# Patient Record
Sex: FEMALE | Race: WHITE | NOT HISPANIC OR LATINO | Employment: OTHER | URBAN - METROPOLITAN AREA
[De-identification: names, ages, dates, MRNs, and addresses within clinical notes are randomized per-mention and may not be internally consistent; named-entity substitution may affect disease eponyms.]

---

## 2017-03-08 ENCOUNTER — ALLSCRIPTS OFFICE VISIT (OUTPATIENT)
Dept: OTHER | Facility: OTHER | Age: 72
End: 2017-03-08

## 2017-06-23 ENCOUNTER — HOSPITAL ENCOUNTER (EMERGENCY)
Facility: HOSPITAL | Age: 72
Discharge: HOME | End: 2017-06-23
Attending: EMERGENCY MEDICINE | Admitting: EMERGENCY MEDICINE
Payer: MEDICARE

## 2017-06-23 VITALS
BODY MASS INDEX: 37.17 KG/M2 | WEIGHT: 202 LBS | RESPIRATION RATE: 20 BRPM | HEART RATE: 69 BPM | OXYGEN SATURATION: 95 % | TEMPERATURE: 97.5 F | HEIGHT: 62 IN | SYSTOLIC BLOOD PRESSURE: 144 MMHG | DIASTOLIC BLOOD PRESSURE: 66 MMHG
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
## 2017-06-23 PROCEDURE — 99281 EMR DPT VST MAYX REQ PHY/QHP: CPT

## 2017-06-23 RX ORDER — DIMENHYDRINATE 50 MG
TABLET ORAL
COMMUNITY

## 2017-06-23 RX ORDER — CYCLOSPORINE 0.5 MG/ML
EMULSION OPHTHALMIC
COMMUNITY
End: 2022-06-02 | Stop reason: ALTCHOICE

## 2017-06-23 RX ORDER — FLUTICASONE PROPIONATE 50 MCG
SPRAY, SUSPENSION (ML) NASAL
COMMUNITY
Start: 2014-11-29 | End: 2022-06-02 | Stop reason: ALTCHOICE

## 2017-06-23 RX ORDER — AMPICILLIN TRIHYDRATE 250 MG
CAPSULE ORAL
COMMUNITY

## 2017-06-23 RX ORDER — CEPHALEXIN 500 MG/1
500 CAPSULE ORAL 2 TIMES DAILY
Qty: 14 CAPSULE | Refills: 0 | Status: SHIPPED | OUTPATIENT
Start: 2017-06-23 | End: 2017-06-30

## 2017-06-23 RX ORDER — TELMISARTAN 80 MG/1
TABLET ORAL
COMMUNITY

## 2017-08-24 ENCOUNTER — TRANSCRIBE ORDERS (OUTPATIENT)
Dept: ADMINISTRATIVE | Facility: HOSPITAL | Age: 72
End: 2017-08-24

## 2017-08-24 DIAGNOSIS — Z12.31 VISIT FOR SCREENING MAMMOGRAM: Primary | ICD-10-CM

## 2017-08-25 ENCOUNTER — HOSPITAL ENCOUNTER (OUTPATIENT)
Dept: RADIOLOGY | Facility: HOSPITAL | Age: 72
Discharge: HOME | End: 2017-08-25
Attending: INTERNAL MEDICINE
Payer: MEDICARE

## 2017-08-25 DIAGNOSIS — Z12.31 VISIT FOR SCREENING MAMMOGRAM: ICD-10-CM

## 2017-08-25 PROCEDURE — G0202 SCR MAMMO BI INCL CAD: HCPCS

## 2017-10-17 ENCOUNTER — TRANSCRIBE ORDERS (OUTPATIENT)
Dept: ADMINISTRATIVE | Facility: HOSPITAL | Age: 72
End: 2017-10-17

## 2017-10-17 ENCOUNTER — HOSPITAL ENCOUNTER (OUTPATIENT)
Dept: RADIOLOGY | Facility: HOSPITAL | Age: 72
Discharge: HOME | End: 2017-10-17
Attending: INTERNAL MEDICINE
Payer: MEDICARE

## 2017-10-17 DIAGNOSIS — M54.50 ACUTE MIDLINE LOW BACK PAIN WITHOUT SCIATICA: Primary | ICD-10-CM

## 2017-10-17 DIAGNOSIS — M47.817 DJD (DEGENERATIVE JOINT DISEASE), LUMBOSACRAL: ICD-10-CM

## 2017-10-17 PROCEDURE — 72110 X-RAY EXAM L-2 SPINE 4/>VWS: CPT

## 2018-01-12 NOTE — MISCELLANEOUS
Message  Mando Cristela is under my professional care  Due to protocol changes, patient does not need an antibiotic prior to dental treatment  If you have any questions please give our office a call  Return to work or school:   SINCERELY,                   [address]  ENRIQUETA Cardenas        Signatures   Electronically signed by : ENRIQUETA Vergara ; Aug 29 2016  3:16PM EST

## 2018-01-13 VITALS
DIASTOLIC BLOOD PRESSURE: 85 MMHG | SYSTOLIC BLOOD PRESSURE: 129 MMHG | HEART RATE: 82 BPM | RESPIRATION RATE: 16 BRPM | HEIGHT: 61 IN | WEIGHT: 206 LBS | BODY MASS INDEX: 38.89 KG/M2

## 2018-08-29 ENCOUNTER — OFFICE VISIT (OUTPATIENT)
Dept: PODIATRY | Facility: CLINIC | Age: 73
End: 2018-08-29
Payer: MEDICARE

## 2018-08-29 VITALS
SYSTOLIC BLOOD PRESSURE: 134 MMHG | BODY MASS INDEX: 37.17 KG/M2 | DIASTOLIC BLOOD PRESSURE: 83 MMHG | WEIGHT: 202 LBS | HEIGHT: 62 IN | RESPIRATION RATE: 17 BRPM | HEART RATE: 72 BPM

## 2018-08-29 DIAGNOSIS — B35.1 ONYCHOMYCOSIS: ICD-10-CM

## 2018-08-29 DIAGNOSIS — M79.671 PAIN IN BOTH FEET: ICD-10-CM

## 2018-08-29 DIAGNOSIS — M79.672 PAIN IN BOTH FEET: ICD-10-CM

## 2018-08-29 DIAGNOSIS — I70.209 PERIPHERAL ARTERIOSCLEROSIS (HCC): Primary | ICD-10-CM

## 2018-08-29 PROCEDURE — 11721 DEBRIDE NAIL 6 OR MORE: CPT | Performed by: PODIATRIST

## 2018-08-29 NOTE — PROGRESS NOTES
Assessment/Plan:  Pain  Mycosis of nail  Paronychia  Peripheral artery disease  No problem-specific Assessment & Plan notes found for this encounter  There are no diagnoses linked to this encounter  Subjective:   Patient complains of pain in her feet with ambulation  No history of trauma  Past Medical History:   Diagnosis Date    Hypertension     Macular degeneration        Past Surgical History:   Procedure Laterality Date    INTRAOCULAR LENS INSERTION         Allergies   Allergen Reactions    Atorvastatin Other (See Comments)     legs get stiff    Zithromax [Azithromycin]     Erythromycin Base Palpitations         Current Outpatient Prescriptions:     Calcium Carb-Cholecalciferol (CALCIUM 1000 + D) 1000-800 MG-UNIT TABS, Calcium TABS  Refills: 0  Active, Disp: , Rfl:     cholecalciferol (VITAMIN D3) 1,000 units tablet, Vitamin D TABS  Refills: 0  Active, Disp: , Rfl:     Cinnamon 500 MG capsule, Cinnamon CAPS  Refills: 0  Active, Disp: , Rfl:     cycloSPORINE (RESTASIS) 0 05 % ophthalmic emulsion, Restasis 0 05 % Ophthalmic Emulsion  Refills: 0  Active, Disp: , Rfl:     Flaxseed, Linseed, (FLAX SEED OIL) 1000 MG CAPS, Flax Seed Oil 1000 MG Oral Capsule  Refills: 0  Active, Disp: , Rfl:     fluticasone (FLONASE) 50 mcg/act nasal spray, Fluticasone Propionate 50 MCG/ACT Nasal Suspension  Quantity: 16; Refills: 0   Started 29-Nov-2014 Active, Disp: , Rfl:     Magnesium 100 MG CAPS, Magnesium TABS  Refills: 0  Active, Disp: , Rfl:     Omega-3 Fatty Acids (FISH OIL) 645 MG CAPS, Fish Oil CAPS  Refills: 0  Active, Disp: , Rfl:     Potassium 95 MG TABS, Potassium TABS  Refills: 0  Active, Disp: , Rfl:     telmisartan (MICARDIS) 80 MG tablet, Micardis 80 MG Oral Tablet  Refills: 0  Active, Disp: , Rfl:     There is no problem list on file for this patient  Patient ID: Darrick Olivia is a 68 y o  female      HPI    The following portions of the patient's history were reviewed and updated as appropriate: allergies, current medications, past family history, past medical history, past social history, past surgical history and problem list     Review of Systems      Objective:  Patient's shoes and socks removed  Foot ExamPhysical Exam           Physical Exam  Left Foot: Appearance: Normal except as noted: excessive nfmvqgoyuwRZs2jbs cavus  Tenderness: None except the great toe,Ow6xedeav longitudinal wqcnBPh2orrysrdrg of the plantar fascia  Right Foot: Appearance: Normal except as noted: excessive upnvksglfoOQi1hkj cavus  Tenderness: None except the medial longitudinal kyavAMl9xcbdlepro of the plantar fascia  Left Ankle: ROM: limited ROM in all planes   Right Ankle: ROM: limited ROM in all planes   Neurological Exam: performed  Light touch was intact bilaterally  Vibratory sensation was intact bilaterally  Response to monofilament test was intact bilaterally  Deep tendon reflexes: patellar reflex present mmkkqwtykamBAe1wrrtvplq reflex present bilaterally  Vascular Exam: performed Dorsalis pedis pulses were diminished bilaterally  Posterior tibial pulses were diminished bilaterally  Elevation Pallor: present bilaterally  Capillary refill time was greater than 3 seconds sbdkdkvdghvCOq4Mchnz 8 findings bilateral negative digital hair noted all mycotic nails debrided today  Edema: mild bilaterally  Toenails: All of the toenails were elongated,An6zsyozjmcjpwve,Xk7tvwncmznzn,Cu1homrxyl,Sn2usjhlwVPc9Xqfgbca  Hyperkeratosis: present on both first sub metatarsals  Shoe Gear Evaluation: Recommendation(s): custom [id number] style  Procedure  All mycotic nails debrided today  Bilateral pre-ulcerative lesions debrided today  Patient may consider having the exostosis of the right Lisfranc joint surgically removed   Patient tolerated all procedures well without pain or complication

## 2018-09-13 ENCOUNTER — TRANSCRIBE ORDERS (OUTPATIENT)
Dept: ADMINISTRATIVE | Facility: HOSPITAL | Age: 73
End: 2018-09-13

## 2018-09-13 DIAGNOSIS — Z12.31 VISIT FOR SCREENING MAMMOGRAM: Primary | ICD-10-CM

## 2018-09-24 ENCOUNTER — HOSPITAL ENCOUNTER (OUTPATIENT)
Dept: RADIOLOGY | Facility: HOSPITAL | Age: 73
Discharge: HOME | End: 2018-09-24
Attending: INTERNAL MEDICINE
Payer: MEDICARE

## 2018-09-24 DIAGNOSIS — Z12.31 VISIT FOR SCREENING MAMMOGRAM: ICD-10-CM

## 2018-09-24 PROCEDURE — 77067 SCR MAMMO BI INCL CAD: CPT

## 2018-12-18 ENCOUNTER — EVALUATION (OUTPATIENT)
Dept: PHYSICAL THERAPY | Facility: CLINIC | Age: 73
End: 2018-12-18
Payer: MEDICARE

## 2018-12-18 DIAGNOSIS — R26.81 UNSTEADY GAIT: Primary | ICD-10-CM

## 2018-12-18 PROCEDURE — 97163 PT EVAL HIGH COMPLEX 45 MIN: CPT

## 2018-12-18 PROCEDURE — G8978 MOBILITY CURRENT STATUS: HCPCS

## 2018-12-18 PROCEDURE — G8979 MOBILITY GOAL STATUS: HCPCS

## 2018-12-18 NOTE — PROGRESS NOTES
PT Evaluation     Today's date: 2018  Patient name: Stephanie Holly  : 1945  MRN: [id number]  Referring provider: Khanh Nunes MD  Dx:   Encounter Diagnosis     ICD-10-CM    1  Unsteady gait R26 81        Start Time: 1115  Stop Time: 1215  Total time in clinic (min): 60 minutes    Assessment  Assessment details: Patient presents with complaints of feeling unsteady in gait  Patient demonstrates increased veering with HT in gait, increased veering to right with eyes closed and sluggish head thrust indicative of vestibular dysfunction  Patient will benefit from skilled intervention to facilitate return to previous level of function addressing goals as noted below  Patient is in agreement with treatment plan  Impairments: abnormal gait and impaired balance    Goals  STG  (4 weeks)  Patient will be independent in vestibular exercises to facilitate return to previous activities                              Patient will demonstrate decreased veering in gait with HT  LTG (8weeks)   Patient will improve 6 min walk test by 100' to increase tolerance to distance ambulation                            Patient will improve FOTO scoring by 20% to increase tolerance to ADL tasks                            Patient will demonstrate improved DGI scoring to >=23/24 to facilitate return to previous tasks      Plan  Plan details: Continue PT per POC  (2018- 2018)  Patient would benefit from: skilled physical therapy  Planned therapy interventions: ADL retraining, balance, coordination, neuromuscular re-education, manual therapy, strengthening, therapeutic activities, therapeutic exercise and home exercise program  Frequency: 2x week  Duration in weeks: 8  Treatment plan discussed with: patient        Subjective Evaluation    History of Present Illness  Mechanism of injury: Patient indicates worsening feeling of being off balance over the past few months    Patient states her son has experienced drug issues and this stress has contributed to depression  Per patient report, her MD is aware and no counseling has been initiated at this time  Patient denies any risk of self-harm  No current complaints of pain noted  Patient denies any dizziness or spinning, no aural fullness noted  Recurrent probem    Pain  Current pain ratin  At best pain ratin  At worst pain ratin    Social Support  Steps to enter house: yes  5  Lives in: multiple-level home  Lives with: spouse    Employment status: not working  Treatments  Previous treatment: physical therapy  Patient Goals  Patient goals for therapy: improved balance and independence with ADLs/IADLs  Patient goal: To be able to move without fear of falling        Objective     NEURO:  Intact finger to nose and HANNAH, negative drift  No abnormal tone noted  Strength t/o LE 4/5  UE left 4-/5 shoulder with flexion 160; Otherwise strength B/L UE 4/5  BALANCE:  Romberg EO/EC  (-),  Sharpened romberg (+) 7 sec  Patient is able to accept moderate perturbation with intact ankle and hip strategies  POSTURE:  Increased forward head and shoulders  Slouched posture assumed in sitting and increased base of support evident in stance  Flowsheet Rows      Most Recent Value   PT/OT G-Codes   Current Score  41   Projected Score  0   FOTO information reviewed  Yes   Assessment Type  Evaluation   G code set  Mobility: Walking & Moving Around   Mobility: Walking and Moving Around Current Status ()  CK   Mobility: Walking and Moving Around Goal Status ()  CJ        OCULOMOTOR:  Smooth pursuit t/o visual field  Head thrust sluggish to left  Positional testing not performed due to no complaints of dizziness or spinning        Precautions: HTN, 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, macular degeneration, h/o shoulder replacement 5 yrs, depression    Daily Treatment Diary     TESTING              TUG 7 5sec            DGI             Gait speed 3 98 feet/sec            6 min walk test 1050ft            Sit to stand 30 sec 15reps                Exercise Diary  12/18            FOAM  FT  EO/EC             FOAM  FT HT             A/P Sway             VOR 1 60sec 2 sets            VOR Cxl             VOR 2             Gait with HT 4 cycles                                                                                                                                                                                         Modalities

## 2018-12-27 ENCOUNTER — APPOINTMENT (OUTPATIENT)
Dept: PHYSICAL THERAPY | Facility: CLINIC | Age: 73
End: 2018-12-27
Payer: MEDICARE

## 2018-12-31 ENCOUNTER — OFFICE VISIT (OUTPATIENT)
Dept: PHYSICAL THERAPY | Facility: CLINIC | Age: 73
End: 2018-12-31
Payer: MEDICARE

## 2018-12-31 DIAGNOSIS — R26.81 UNSTEADY GAIT: Primary | ICD-10-CM

## 2018-12-31 PROCEDURE — 97112 NEUROMUSCULAR REEDUCATION: CPT

## 2018-12-31 NOTE — PROGRESS NOTES
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, macular degeneration, h/o shoulder replacement 5 yrs, depression    Daily Treatment Diary     TESTING              TUG 7 5sec            DGI             Gait speed 3 98 feet/sec            6 min walk test 1050ft            Sit to stand 30 sec 15reps                Exercise Diary             FOAM  FT  EO/EC  10sec 10 reps FIRM           FOAM  FT HT  FIRM 10reps 2 sets           A/P Sway  FOAM 30sec           VOR 1 60sec 2 sets 60sec 2 sets           VOR Cxl  10reps 2 sets           VOR 2             Gait with HT 4 cycles 6 cycles           Tandem in corner  20sec 5 reps                                                                                                                                                                           Modalities                                                           Assessment: Patient retested for BPPV due to increased aural fullness and increased symptoms  DHP (-)  Re-initiated vestibular challenges with increased gait challenges  Initiated EC exercises, tandem stance in corner and HT in gait  Patient encouraged to continue at home  Patient voiced understanding and has been given written instructions  Patient will continue to benefit from skilled intervention to facilitate improved stability and balance in all ADL tasks  Plan: Continue PT per POC    (2018- 2018)  Patient without complaints post treatment

## 2019-01-03 ENCOUNTER — OFFICE VISIT (OUTPATIENT)
Dept: PHYSICAL THERAPY | Facility: CLINIC | Age: 74
End: 2019-01-03
Payer: MEDICARE

## 2019-01-03 DIAGNOSIS — R26.81 UNSTEADY GAIT: Primary | ICD-10-CM

## 2019-01-03 PROCEDURE — 97112 NEUROMUSCULAR REEDUCATION: CPT

## 2019-01-03 PROCEDURE — 97110 THERAPEUTIC EXERCISES: CPT

## 2019-01-03 NOTE — PROGRESS NOTES
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, macular degeneration, h/o shoulder replacement 5 yrs, depression    Daily Treatment Diary     TESTING              TUG 7 5sec            DGI             Gait speed 3 98 feet/sec            6 min walk test 1050ft            Sit to stand 30 sec 15reps                Exercise Diary  12/18 12/31 1/3/19          FOAM  FT  EO/EC  10sec 10 reps FIRM 10sec 10reps FOAM LOB=3          FOAM  FT HT  FIRM 10reps 2 sets FOAM EC FC 10reps 2 sets          A/P Sway  FOAM 30sec           VOR 1 60sec 2 sets 60sec 2 sets 60sec 2 sets, added VS 60sec 2 sets          VOR Cxl  10reps 2 sets W/ VS CW/CCW w/ circles 10reps 2 sets          VOR 2             Gait with HT 4 cycles 6 cycles 6 cycles          Tandem in corner  20sec 5 reps 20sec 5 reps          Stepping over hurdles    Forward low hurdles 6cycles          Sidestepping over hurdles   4 cycles          Gait with HT on diagonal   6 cycles          Sidestepping with cone taps one UE   4 cycles                                                                                                                      Modalities                                                           Assessment:  Advanced HT in gait to diagonals with good tolerance  Increased veering noted with up to the left  Introduced hurdles with encouragement needed due to fear of falls  Continue to progress gait challenges per tolerance     Patient will continue to benefit from skilled intervention to facilitate improved stability and balance in all ADL tasks  Plan: Continue PT per POC  (12/18/2018- 2/12/2018)  Patient without complaints post treatment

## 2019-01-07 ENCOUNTER — OFFICE VISIT (OUTPATIENT)
Dept: PHYSICAL THERAPY | Facility: CLINIC | Age: 74
End: 2019-01-07
Payer: MEDICARE

## 2019-01-07 DIAGNOSIS — R26.81 UNSTEADY GAIT: Primary | ICD-10-CM

## 2019-01-07 PROCEDURE — 97112 NEUROMUSCULAR REEDUCATION: CPT

## 2019-01-07 PROCEDURE — 97110 THERAPEUTIC EXERCISES: CPT

## 2019-01-07 NOTE — PROGRESS NOTES
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, macular degeneration, h/o shoulder replacement 5 yrs, depression    Daily Treatment Diary     TESTING              TUG 7 5sec            DGI             Gait speed 3 98 feet/sec            6 min walk test 1050ft            Sit to stand 30 sec 15reps                Exercise Diary  12/18 12/31 1/3/19 1/7         FOAM  FT  EO/EC  10sec 10 reps FIRM 10sec 10reps FOAM LOB=3          FOAM  FT HT  FIRM 10reps 2 sets FOAM EC FC 10reps 2 sets          A/P Sway  FOAM 30sec           VOR 1 60sec 2 sets 60sec 2 sets 60sec 2 sets, added VS 60sec 2 sets 60sec 2 sets w/ VS         VOR Cxl  10reps 2 sets W/ VS CW/CCW w/ circles 10reps 2 sets W/ VS  CW/CCW 10reps 2 sets         VOR 2    30sec 4 sets, occ stop for tracking         Gait with HT 4 cycles 6 cycles 6 cycles 6 cycles         Tandem in corner  20sec 5 reps 20sec 5 reps 30sec 5 reps, decreased haptic touch         Stepping over hurdles    Forward low hurdles 6cycles F/B 6" hurdles 6 cycles         Sidestepping over hurdles   4 cycles 4 cycles         Gait with HT on diagonal   6 cycles 6 cycles nice gains in up to left, added up to right for HEP see below         Sidestepping with cone taps one UE   4 cycles 4 cycles one UE on wall                                                                                                                     Modalities                                                           Assessment: Initiated VOR 2 with occasional cuing needed for tracking    Nice gains with decreased UE support on foam challenges  While increased stability noted with diagonal HT up to left, increased veering noted up to right (patient has not been performing this position at home)    Recommended continued diagonal HT B/L and patient voiced understanding  patient also instructed to continue VOR 2 with written instructions given  Patient will continue to benefit from skilled intervention to facilitate improved stability and balance in all ADL tasks  Plan: Continue PT per POC  (12/18/2018- 2/12/2018)  Patient without complaints post treatment

## 2019-01-10 ENCOUNTER — OFFICE VISIT (OUTPATIENT)
Dept: PHYSICAL THERAPY | Facility: CLINIC | Age: 74
End: 2019-01-10
Payer: MEDICARE

## 2019-01-10 DIAGNOSIS — R26.81 UNSTEADY GAIT: Primary | ICD-10-CM

## 2019-01-10 PROCEDURE — 97112 NEUROMUSCULAR REEDUCATION: CPT

## 2019-01-10 PROCEDURE — 97110 THERAPEUTIC EXERCISES: CPT

## 2019-01-10 NOTE — PROGRESS NOTES
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, macular degeneration, h/o shoulder replacement 5 yrs, depression    Daily Treatment Diary     TESTING              TUG 7 5sec            DGI             Gait speed 3 98 feet/sec            6 min walk test 1050ft            Sit to stand 30 sec 15reps                Exercise Diary  12/18 12/31 1/3/19 1/7 1/10        FOAM  FT  EO/EC  10sec 10 reps FIRM 10sec 10reps FOAM LOB=3          FOAM  FT HT  FIRM 10reps 2 sets FOAM EC FC 10reps 2 sets          A/P Sway  FOAM 30sec           VOR 1 60sec 2 sets 60sec 2 sets 60sec 2 sets, added VS 60sec 2 sets 60sec 2 sets w/ VS 90sec 2 sets w/ VS        VOR Cxl  10reps 2 sets W/ VS CW/CCW w/ circles 10reps 2 sets W/ VS  CW/CCW 10reps 2 sets W/ VS CW/CCW 20reps 2 sets        VOR 2    30sec 4 sets, occ stop for tracking 60sec 2 sets increased tracking noted        Gait with HT 4 cycles 6 cycles 6 cycles 6 cycles 6 cycles        Tandem in corner  20sec 5 reps 20sec 5 reps 30sec 5 reps, decreased haptic touch 30sec 10 reps, unilateral stance 5 sec 10 reps B/L        Stepping over hurdles    Forward low hurdles 6cycles F/B 6" hurdles 6 cycles         Sidestepping over hurdles   4 cycles 4 cycles         Gait with HT on diagonal   6 cycles 6 cycles nice gains in up to left, added up to right for HEP see below 6 cycles now with no veering in either direction        Sidestepping on foam with cone taps one UE   4 cycles 4 cycles one UE on wall 4 cycles without UE support, increased dizziness Sidestepping on foam     4 cycles                                                                                                       Modalities                                                           Assessment:   Patient able to demonstrate nice gains in tracking with VOR 2  Added unilateral stance in corner for home to improve static and dynamic balance  Encouraged patient to go out into community more frequently  Patient voiced understanding, however, due to intermittent diarrhea patient has been limited  Patient is to see GI specialist next week, details to follow  Nice gains with HT with decreased veering  Cone taps on foam continue to be challenging  Patient will continue to benefit from skilled intervention to facilitate improved stability and balance in all ADL tasks  Plan: Continue PT per POC  (12/18/2018- 2/12/2018)  Patient without complaints post treatment

## 2019-01-14 ENCOUNTER — OFFICE VISIT (OUTPATIENT)
Dept: PHYSICAL THERAPY | Facility: CLINIC | Age: 74
End: 2019-01-14
Payer: MEDICARE

## 2019-01-14 DIAGNOSIS — R26.81 UNSTEADY GAIT: Primary | ICD-10-CM

## 2019-01-14 PROCEDURE — 97112 NEUROMUSCULAR REEDUCATION: CPT

## 2019-01-14 PROCEDURE — 97110 THERAPEUTIC EXERCISES: CPT

## 2019-01-14 NOTE — PROGRESS NOTES
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, macular degeneration, h/o shoulder replacement 5 yrs, depression    Daily Treatment Diary     TESTING              TUG 7 5sec            DGI             Gait speed 3 98 feet/sec            6 min walk test 1050ft            Sit to stand 30 sec 15reps                Exercise Diary  12/18 12/31 1/3/19 1/7 1/10 1/14       FOAM  FT  EO/EC  10sec 10 reps FIRM 10sec 10reps FOAM LOB=3   10sec 10repsFOAM LOB=0       FOAM  FT HT  FIRM 10reps 2 sets FOAM EC FC 10reps 2 sets   FOAM EC 10reps 2 sets LOB=0       A/P Sway  FOAM 30sec    FOAM 60sec       VOR 1 60sec 2 sets 60sec 2 sets 60sec 2 sets, added VS 60sec 2 sets 60sec 2 sets w/ VS 90sec 2 sets w/ VS 90sec 2 sets w/ VS walking F/B       VOR Cxl  10reps 2 sets W/ VS CW/CCW w/ circles 10reps 2 sets W/ VS  CW/CCW 10reps 2 sets W/ VS CW/CCW 20reps 2 sets Foam w/ VS CW/CCW 20 reps 2 sets       VOR 2    30sec 4 sets, occ stop for tracking 60sec 2 sets increased tracking noted On foam 90 sec 2 sets        Gait with HT 4 cycles 6 cycles 6 cycles 6 cycles 6 cycles 4 cycles        Tandem in corner  20sec 5 reps 20sec 5 reps 30sec 5 reps, decreased haptic touch 30sec 10 reps, unilateral stance 5 sec 10 reps B/L        Stepping over hurdles    Forward low hurdles 6cycles F/B 6" hurdles 6 cycles  F/B 6" 6 cycles       Sidestepping over hurdles   4 cycles 4 cycles  6 cycles       Gait with HT on diagonal   6 cycles 6 cycles nice gains in up to left, added up to right for HEP see below 6 cycles now with no veering in either direction 6 cycles       Sidestepping on foam with cone taps one UE   4 cycles 4 cycles one UE on wall 4 cycles without UE support, increased dizziness 4 cycles one UE support       Sidestepping on foam     4 cycles 4 cycles                                                                                                      Modalities                                                           Assessment:   Nice gains noted with osmar steps with increased stability noted  Cone taps continue to be challenging  Increased difficulty with VOR 1 walking forward and back and VOR 2 with foam   Continue to progress vestibular challenges per tolerance  Patient will continue to benefit from skilled intervention to facilitate improved stability and balance in all ADL tasks  Plan: Continue PT per POC  (12/18/2018- 2/12/2018)  Patient without complaints post treatment

## 2019-01-21 ENCOUNTER — OFFICE VISIT (OUTPATIENT)
Dept: PHYSICAL THERAPY | Facility: CLINIC | Age: 74
End: 2019-01-21
Payer: MEDICARE

## 2019-01-21 DIAGNOSIS — R26.81 UNSTEADY GAIT: Primary | ICD-10-CM

## 2019-01-21 PROCEDURE — 97112 NEUROMUSCULAR REEDUCATION: CPT

## 2019-01-21 PROCEDURE — 97110 THERAPEUTIC EXERCISES: CPT

## 2019-01-21 NOTE — PROGRESS NOTES
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, macular degeneration, h/o shoulder replacement 5 yrs, depression    Daily Treatment Diary     TESTING              TUG 7 5sec            DGI             Gait speed 3 98 feet/sec            6 min walk test 1050ft            Sit to stand 30 sec 15reps                Exercise Diary  12/18 12/31 1/3/19 1/7 1/10 1/14 1/21      FOAM  FT  EO/EC  10sec 10 reps FIRM 10sec 10reps FOAM LOB=3   10sec 10repsFOAM LOB=0       FOAM  FT HT  FIRM 10reps 2 sets FOAM EC FC 10reps 2 sets   FOAM EC 10reps 2 sets LOB=0       A/P Sway  FOAM 30sec    FOAM 60sec       VOR 1 60sec 2 sets 60sec 2 sets 60sec 2 sets, added VS 60sec 2 sets 60sec 2 sets w/ VS 90sec 2 sets w/ VS 90sec 2 sets w/ VS walking F/B 90sec 2 sets VS w/ walking F/B- veering noted      VOR Cxl  10reps 2 sets W/ VS CW/CCW w/ circles 10reps 2 sets W/ VS  CW/CCW 10reps 2 sets W/ VS CW/CCW 20reps 2 sets Foam w/ VS CW/CCW 20 reps 2 sets FOAM w/ VS CW/CCW 20 reps 2 sets      VOR 2    30sec 4 sets, occ stop for tracking 60sec 2 sets increased tracking noted On foam 90 sec 2 sets  On foam w/ VS 90sec 2 sets      Gait with HT 4 cycles 6 cycles 6 cycles 6 cycles 6 cycles 4 cycles  4 cycles      Tandem in corner  20sec 5 reps 20sec 5 reps 30sec 5 reps, decreased haptic touch 30sec 10 reps, unilateral stance 5 sec 10 reps B/L  30sec hold 5 reps       Stepping over hurdles    Forward low hurdles 6cycles F/B 6" hurdles 6 cycles  F/B 6" 6 cycles       Sidestepping over hurdles   4 cycles 4 cycles  6 cycles       Gait with HT on diagonal   6 cycles 6 cycles nice gains in up to left, added up to right for HEP see below 6 cycles now with no veering in either direction 6 cycles 6 cycles no veering noted      Sidestepping on foam with cone taps one UE   4 cycles 4 cycles one UE on wall 4 cycles without UE support, increased dizziness 4 cycles one UE support 2 cycles one UE support      Sidestepping on foam     4 cycles 4 cycles 4 cycles      Ankle inversion/eversion TB       10reps 2 sets each ankle Blue TB      Heel to toe ambulation       4 cycles                                                                           Modalities                                                           Assessment:   Patient exhibits decreased veering with HT in gait  Added Tandem walking with increased loss of balance noted  Tandem stance improving  Patient encouraged to continue tandem walking in storey for safety at home, patient voiced understanding  VS with increased veering noted with VOR 1 walking forward and back  While patient has been performing VOR 1 at home, no VS available  Patient given checkerboard to use at home for VS   Patient noted to have ankle weakness, added ankle exercises with TB with good demonstration of technique and patient voiced understanding  Patient will continue to benefit from skilled intervention to facilitate improved stability and balance in all ADL tasks  Plan: Continue PT per POC  (12/18/2018- 2/12/2018)  Patient without complaints post treatment

## 2019-01-24 ENCOUNTER — OFFICE VISIT (OUTPATIENT)
Dept: PHYSICAL THERAPY | Facility: CLINIC | Age: 74
End: 2019-01-24
Payer: MEDICARE

## 2019-01-24 DIAGNOSIS — R26.81 UNSTEADY GAIT: Primary | ICD-10-CM

## 2019-01-24 PROCEDURE — 97112 NEUROMUSCULAR REEDUCATION: CPT

## 2019-01-24 PROCEDURE — G8978 MOBILITY CURRENT STATUS: HCPCS

## 2019-01-24 PROCEDURE — 97110 THERAPEUTIC EXERCISES: CPT

## 2019-01-24 PROCEDURE — G8979 MOBILITY GOAL STATUS: HCPCS

## 2019-01-24 NOTE — PROGRESS NOTES
PT DISCHARGE    Today's date: 2019  Patient name: Kalen Venegas  : 1945  MRN: [id number]  Referring provider: Robin Blackwood MD  Dx:   Encounter Diagnosis     ICD-10-CM    1  Unsteady gait R26 81        Start Time: 108  Stop Time: 0150  Total time in clinic (min): 42 minutes    Assessment  Assessment details: Patient demonstrates nice gains in gait with HT without veering noted  Patient has improved DGI and TUG scoring with good tolerance  Patient has attained all goals with PT and has returned to previous activities without symptoms noted  Patient is discharged from PT at this time and has been encouraged to continue HEP and to contact PT should any questions or problems arise  Patient in agreement with discharge plans  Goals  STG  (4 weeks)  Patient will be independent in vestibular exercises to facilitate return to previous activities-MET                              Patient will demonstrate decreased veering in gait with HT-MET  LTG (8weeks)   Patient will improve 6 min walk test by 100' to increase tolerance to distance ambulation-MET                            Patient will improve FOTO scoring by 20% to increase tolerance to ADL tasks-MET                            Patient will demonstrate improved DGI scoring to >=23/24 to facilitate return to previous tasks-MET          Plan  Treatment plan discussed with: patient        Subjective Evaluation    History of Present Illness  Mechanism of injury: Patient indicates worsening feeling of being off balance over the past few months  Patient states her son has experienced drug issues and this stress has contributed to depression  Per patient report, her MD is aware and no counseling has been initiated at this time  Patient denies any risk of self-harm  No current complaints of pain noted  Patient denies any dizziness or spinning, no aural fullness noted    Recurrent probem    Pain  Current pain ratin  At best pain ratin  At worst pain ratin    Social Support  Steps to enter house: yes  5  Lives in: multiple-level home  Lives with: spouse    Employment status: not working  Treatments  Previous treatment: physical therapy  Patient Goals  Patient goals for therapy: improved balance and independence with ADLs/IADLs  Patient goal: To be able to move without fear of falling        Objective     NEURO:  Intact finger to nose and HANNAH, negative drift  No abnormal tone noted  Strength t/o LE 4/5  UE left 4-/5 shoulder with flexion 160; Otherwise strength B/L UE 4/5  BALANCE:  Romberg EO/EC  (-),  Sharpened romberg (+) 7 sec  Patient is able to accept moderate perturbation with intact ankle and hip strategies  2019  Sharpened romberg (+) 15sec nice gains in increased time  POSTURE:  Increased forward head and shoulders  Slouched posture assumed in sitting and increased base of support evident in stance  Flowsheet Rows      Most Recent Value   PT/OT G-Codes   Current Score  85   Projected Score  62   FOTO information reviewed  Yes   Assessment Type  Re-evaluation   G code set  Mobility: Walking & Moving Around   Mobility: Walking and Moving Around Current Status ()  CJ   Mobility: Walking and Moving Around Goal Status ()  CJ        OCULOMOTOR:  Smooth pursuit t/o visual field  Head thrust sluggish to left  Positional testing not performed due to no complaints of dizziness or spinning        Precautions: HTN, 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, macular degeneration, h/o shoulder replacement 5 yrs, depression    Daily Treatment Diary     TESTING             TUG 7 5sec 7 4sec           DGI            Gait speed 3 98 feet/sec 3 99'/sec           6 min walk test 1050ft 1300'           Sit to stand 30 sec 15reps 17reps               Exercise Diary             FOAM  FT  EO/EC             FOAM  FT HT             A/P Sway  60sec           VOR 1 60sec 2 sets 60sec 2 sets           VOR Cxl             VOR 2 Gait with HT 4 cycles 6 cycles                                                                                                                                                                                        Modalities

## 2019-01-28 ENCOUNTER — APPOINTMENT (OUTPATIENT)
Dept: PHYSICAL THERAPY | Facility: CLINIC | Age: 74
End: 2019-01-28
Payer: MEDICARE

## 2019-01-31 ENCOUNTER — APPOINTMENT (OUTPATIENT)
Dept: PHYSICAL THERAPY | Facility: CLINIC | Age: 74
End: 2019-01-31
Payer: MEDICARE

## 2019-04-05 ENCOUNTER — TRANSCRIBE ORDERS (OUTPATIENT)
Dept: ADMINISTRATIVE | Facility: HOSPITAL | Age: 74
End: 2019-04-05

## 2019-04-05 DIAGNOSIS — M85.80 OSTEOPENIA, UNSPECIFIED LOCATION: Primary | ICD-10-CM

## 2019-04-16 ENCOUNTER — HOSPITAL ENCOUNTER (OUTPATIENT)
Dept: RADIOLOGY | Facility: HOSPITAL | Age: 74
Discharge: HOME | End: 2019-04-16
Attending: INTERNAL MEDICINE
Payer: MEDICARE

## 2019-04-16 DIAGNOSIS — M85.80 OSTEOPENIA, UNSPECIFIED LOCATION: ICD-10-CM

## 2019-04-16 PROCEDURE — 77080 DXA BONE DENSITY AXIAL: CPT

## 2019-07-16 ENCOUNTER — OFFICE VISIT (OUTPATIENT)
Dept: PODIATRY | Facility: CLINIC | Age: 74
End: 2019-07-16
Payer: MEDICARE

## 2019-07-16 VITALS — HEART RATE: 71 BPM | RESPIRATION RATE: 16 BRPM | DIASTOLIC BLOOD PRESSURE: 59 MMHG | SYSTOLIC BLOOD PRESSURE: 145 MMHG

## 2019-07-16 DIAGNOSIS — B35.1 ONYCHOMYCOSIS: ICD-10-CM

## 2019-07-16 DIAGNOSIS — M79.671 PAIN IN BOTH FEET: ICD-10-CM

## 2019-07-16 DIAGNOSIS — I70.209 PERIPHERAL ARTERIOSCLEROSIS (HCC): Primary | ICD-10-CM

## 2019-07-16 DIAGNOSIS — M79.672 PAIN IN BOTH FEET: ICD-10-CM

## 2019-07-16 PROCEDURE — 11721 DEBRIDE NAIL 6 OR MORE: CPT | Performed by: PODIATRIST

## 2019-07-16 NOTE — PROGRESS NOTES
Assessment/Plan:    Aseptic debridement and planning of nails x10 and manually and mechanically  Discussed proper foot care daily foot checks  Follow-up 3 months     Diagnoses and all orders for this visit:    Peripheral arteriosclerosis ([address] )    Pain in both feet    Onychomycosis          Subjective:      Patient ID: Claudean Floras is a 69 y o  female  Patient has chief complaint of thick painful nails that hurt when walking wearing shoes  Patient has not noticed any redness or signs of infection  Past Medical History:   Diagnosis Date    Hypertension     Macular degeneration          Current Outpatient Medications:     Calcium Carb-Cholecalciferol (CALCIUM 1000 + D) 1000-800 MG-UNIT TABS, Calcium TABS  Refills: 0  Active, Disp: , Rfl:     cholecalciferol (VITAMIN D3) 1,000 units tablet, Vitamin D TABS  Refills: 0  Active, Disp: , Rfl:     Cinnamon 500 MG capsule, Cinnamon CAPS  Refills: 0  Active, Disp: , Rfl:     cycloSPORINE (RESTASIS) 0 05 % ophthalmic emulsion, Restasis 0 05 % Ophthalmic Emulsion  Refills: 0  Active, Disp: , Rfl:     Flaxseed, Linseed, (FLAX SEED OIL) 1000 MG CAPS, Flax Seed Oil 1000 MG Oral Capsule  Refills: 0  Active, Disp: , Rfl:     fluticasone (FLONASE) 50 mcg/act nasal spray, Fluticasone Propionate 50 MCG/ACT Nasal Suspension  Quantity: 16;   Refills: 0   Started 29-Nov-2014 Active, Disp: , Rfl:     Magnesium 100 MG CAPS, Magnesium TABS  Refills: 0  Active, Disp: , Rfl:     Omega-3 Fatty Acids (FISH OIL) 645 MG CAPS, Fish Oil CAPS  Refills: 0  Active, Disp: , Rfl:     Potassium 95 MG TABS, Potassium TABS  Refills: 0  Active, Disp: , Rfl:     telmisartan (MICARDIS) 80 MG tablet, Micardis 80 MG Oral Tablet  Refills: 0  Active, Disp: , Rfl:     Past Surgical History:   Procedure Laterality Date    INTRAOCULAR LENS INSERTION         Allergies   Allergen Reactions    Atorvastatin Other (See Comments)     legs get stiff    Zithromax [Azithromycin]     Erythromycin Base Palpitations       Patient Active Problem List   Diagnosis    Peripheral arteriosclerosis ([address] )    Pain in both feet    Onychomycosis       Review of Systems   Constitutional: Negative  HENT: Negative  Eyes: Negative  Respiratory: Negative  Cardiovascular: Negative  Gastrointestinal: Negative  Endocrine: Negative  Genitourinary: Negative  Musculoskeletal: Negative  Skin: Negative  Allergic/Immunologic: Negative  Neurological: Negative  Hematological: Negative  Psychiatric/Behavioral: Negative  Objective:  Patient's shoes and socks were removed, feet examined  /59   Pulse 71   Resp 16       Foot Exam    General  General Appearance: appears stated age and healthy   Orientation: alert and oriented to person, place, and time   Affect: appropriate       Right Foot/Ankle     Inspection and Palpation  Arch: pes planus  Hammertoes: second toe, fifth toe, fourth toe and third toe  Hallux limitus: yes  Skin Exam: callus and skin changes; Neurovascular  Dorsalis pedis: 1+  Posterior tibial: 1+      Left Foot/Ankle      Inspection and Palpation  Arch: pes planus  Hammertoes: second toe, fifth toe, fourth toe and third toe  Hallux limitus: yes  Skin Exam: callus and skin changes; Neurovascular  Dorsalis pedis: 1+  Posterior tibial: 1+          Right Foot/Ankle   Right Foot Inspection  Skin Exam: callus and callus                              Vascular    The right DP pulse is 1+  The right PT pulse is 1+  Right Toe  - Comprehensive Exam  Arch: pes planus  Hammertoes: second toe, fifth toe, fourth toe and third toe  Hallux limitus: yes    Left Foot/Ankle  Left Foot Inspection  Skin Exam: callus                                           Vascular    The left DP pulse is 1+  The left PT pulse is 1+     Left Toe  - Comprehensive Exam  Arch: pes planus  Hammertoes: second toe, fifth toe, fourth toe and third toe  Hallux limitus: yes         Physical Exam Cardiovascular:   Pulses:       Dorsalis pedis pulses are 1+ on the right side, and 1+ on the left side  Posterior tibial pulses are 1+ on the right side, and 1+ on the left side  Feet:   Right Foot:   Skin Integrity: Positive for callus  Left Foot:   Skin Integrity: Positive for callus           Procedures    All nails are thick, discolored, dystrophic possible debris positive pain on palpation  Moderate incurvation bilateral hallux nails  Skin is hairless and atrophic bilateral  Decreased vibratory sensation bilateral feet and ankles  Monofilament sensation reduced bilateral feet and ankles  Muscle strength 5/5 all groups bilateral feet and ankles  No open wounds or signs of infection

## 2019-09-27 ENCOUNTER — TRANSCRIBE ORDERS (OUTPATIENT)
Dept: ADMINISTRATIVE | Facility: HOSPITAL | Age: 74
End: 2019-09-27

## 2019-09-27 DIAGNOSIS — Z12.39 BREAST SCREENING, UNSPECIFIED: Primary | ICD-10-CM

## 2019-10-01 ENCOUNTER — HOSPITAL ENCOUNTER (OUTPATIENT)
Dept: RADIOLOGY | Facility: HOSPITAL | Age: 74
Discharge: HOME | End: 2019-10-01
Attending: INTERNAL MEDICINE
Payer: MEDICARE

## 2019-10-01 VITALS — HEIGHT: 63 IN | WEIGHT: 202 LBS | BODY MASS INDEX: 35.79 KG/M2

## 2019-10-01 DIAGNOSIS — Z12.39 BREAST SCREENING: ICD-10-CM

## 2019-10-01 PROCEDURE — 77067 SCR MAMMO BI INCL CAD: CPT

## 2019-12-12 ENCOUNTER — TRANSCRIBE ORDERS (OUTPATIENT)
Dept: ADMINISTRATIVE | Facility: HOSPITAL | Age: 74
End: 2019-12-12

## 2019-12-12 DIAGNOSIS — I49.9 CARDIAC ARRHYTHMIA, UNSPECIFIED CARDIAC ARRHYTHMIA TYPE: Primary | ICD-10-CM

## 2020-08-10 ENCOUNTER — OFFICE VISIT (OUTPATIENT)
Dept: PODIATRY | Facility: CLINIC | Age: 75
End: 2020-08-10
Payer: MEDICARE

## 2020-08-10 VITALS — HEIGHT: 63 IN | RESPIRATION RATE: 16 BRPM | WEIGHT: 202 LBS | BODY MASS INDEX: 35.79 KG/M2

## 2020-08-10 DIAGNOSIS — B35.1 ONYCHOMYCOSIS: ICD-10-CM

## 2020-08-10 DIAGNOSIS — I70.209 PERIPHERAL ARTERIOSCLEROSIS (HCC): Primary | ICD-10-CM

## 2020-08-10 DIAGNOSIS — M79.671 PAIN IN BOTH FEET: ICD-10-CM

## 2020-08-10 DIAGNOSIS — M79.672 PAIN IN BOTH FEET: ICD-10-CM

## 2020-08-10 PROCEDURE — 11721 DEBRIDE NAIL 6 OR MORE: CPT | Performed by: PODIATRIST

## 2020-08-10 NOTE — PROGRESS NOTES
Assessment/Plan:  Pain  Mycosis of nail  Paronychia  Peripheral artery disease      Plan  Foot exam performed  Patient educated on condition  All mycotic nails debrided without pain or complication      Subjective:   Patient complains of pain in her feet with ambulation  No history of trauma           Past Medical History:   Diagnosis Date    Hypertension      Macular degeneration                 Past Surgical History:   Procedure Laterality Date    INTRAOCULAR LENS INSERTION                   Allergies   Allergen Reactions    Atorvastatin Other (See Comments)       legs get stiff    Zithromax [Azithromycin]      Erythromycin Base Palpitations            Current Outpatient Prescriptions:     Calcium Carb-Cholecalciferol (CALCIUM 1000 + D) 1000-800 MG-UNIT TABS, Calcium TABS  Refills: 0  Active, Disp: , Rfl:     cholecalciferol (VITAMIN D3) 1,000 units tablet, Vitamin D TABS  Refills: 0  Active, Disp: , Rfl:     Cinnamon 500 MG capsule, Cinnamon CAPS  Refills: 0  Active, Disp: , Rfl:     cycloSPORINE (RESTASIS) 0 05 % ophthalmic emulsion, Restasis 0 05 % Ophthalmic Emulsion  Refills: 0  Active, Disp: , Rfl:     Flaxseed, Linseed, (FLAX SEED OIL) 1000 MG CAPS, Flax Seed Oil 1000 MG Oral Capsule  Refills: 0  Active, Disp: , Rfl:     fluticasone (FLONASE) 50 mcg/act nasal spray, Fluticasone Propionate 50 MCG/ACT Nasal Suspension  Quantity: 16;   Refills: 0   Started 29-Nov-2014 Active, Disp: , Rfl:     Magnesium 100 MG CAPS, Magnesium TABS  Refills: 0  Active, Disp: , Rfl:     Omega-3 Fatty Acids (FISH OIL) 645 MG CAPS, Fish Oil CAPS  Refills: 0  Active, Disp: , Rfl:     Potassium 95 MG TABS, Potassium TABS  Refills: 0  Active, Disp: , Rfl:     telmisartan (MICARDIS) 80 MG tablet, Micardis 80 MG Oral Tablet  Refills: 0  Active, Disp: , Rfl:      There is no problem list on file for this patient             Patient ID: Renu Cristina is a 68 y o  female      HPI     The following portions of the patient's history were reviewed and updated as appropriate: allergies, current medications, past family history, past medical history, past social history, past surgical history and problem list      Review of Systems       Objective:  Patient's shoes and socks removed  Foot ExamPhysical Exam             Physical Exam  Left Foot: Appearance: Normal except as noted: excessive kupsedlfpbQAy9tkb cavus  Tenderness: None except the great toe,Sr4gbohgn longitudinal fhzwBPq5dbucxkbbu of the plantar fascia  Right Foot: Appearance: Normal except as noted: excessive jeqjjvfaddJDi6tmq cavus  Tenderness: None except the medial longitudinal dkxgLBf8sunahmktc of the plantar fascia  Left Ankle: ROM: limited ROM in all planes   Right Ankle: ROM: limited ROM in all planes   Neurological Exam: performed  Light touch was intact bilaterally  Vibratory sensation was intact bilaterally  Response to monofilament test was intact bilaterally  Deep tendon reflexes: patellar reflex present qyfgdvrgvnaQHr0zxjopwzv reflex present bilaterally  Vascular Exam: performed Dorsalis pedis pulses were diminished bilaterally  Posterior tibial pulses were diminished bilaterally  Elevation Pallor: present bilaterally  Capillary refill time was greater than 3 seconds yubrhxvkmitIKb7Lwowx 8 findings bilateral negative digital hair noted all mycotic nails debrided today  Edema: mild bilaterally  Toenails: All of the toenails were elongated,Ac2wztzhtzeugjrf,Wx8zoddcqdqmp,Rm3ijinwrm,Hc4rilottVSe5Fgsunji  Hyperkeratosis: present on both first sub metatarsals  Shoe Gear Evaluation: Recommendation(s): custom [id number] style       Procedure  All mycotic nails debrided today  Bilateral pre-ulcerative lesions debrided today  Patient may consider having the exostosis of the right Lisfranc joint surgically removed   Patient tolerated all procedures well without pain or complication

## 2020-10-07 ENCOUNTER — TRANSCRIBE ORDERS (OUTPATIENT)
Dept: ADMINISTRATIVE | Facility: HOSPITAL | Age: 75
End: 2020-10-07

## 2020-10-07 DIAGNOSIS — Z12.31 ENCOUNTER FOR SCREENING MAMMOGRAM FOR MALIGNANT NEOPLASM OF BREAST: Primary | ICD-10-CM

## 2020-11-06 ENCOUNTER — HOSPITAL ENCOUNTER (OUTPATIENT)
Dept: RADIOLOGY | Facility: HOSPITAL | Age: 75
Discharge: HOME | End: 2020-11-06
Attending: INTERNAL MEDICINE
Payer: MEDICARE

## 2020-11-06 VITALS — HEIGHT: 62 IN | WEIGHT: 180 LBS | BODY MASS INDEX: 33.13 KG/M2

## 2020-11-06 DIAGNOSIS — Z12.31 ENCOUNTER FOR SCREENING MAMMOGRAM FOR MALIGNANT NEOPLASM OF BREAST: ICD-10-CM

## 2020-11-06 PROCEDURE — 77067 SCR MAMMO BI INCL CAD: CPT

## 2020-11-06 PROCEDURE — 77063 BREAST TOMOSYNTHESIS BI: CPT

## 2020-12-10 ENCOUNTER — OFFICE VISIT (OUTPATIENT)
Dept: PODIATRY | Facility: CLINIC | Age: 75
End: 2020-12-10

## 2020-12-10 VITALS — BODY MASS INDEX: 33.13 KG/M2 | RESPIRATION RATE: 17 BRPM | HEIGHT: 62 IN | WEIGHT: 180 LBS

## 2020-12-10 DIAGNOSIS — I70.209 PERIPHERAL ARTERIOSCLEROSIS (HCC): Primary | ICD-10-CM

## 2020-12-10 DIAGNOSIS — M79.672 PAIN IN BOTH FEET: ICD-10-CM

## 2020-12-10 DIAGNOSIS — M79.671 PAIN IN BOTH FEET: ICD-10-CM

## 2020-12-10 DIAGNOSIS — B35.1 ONYCHOMYCOSIS: ICD-10-CM

## 2021-02-14 ENCOUNTER — IMMUNIZATIONS (OUTPATIENT)
Dept: FAMILY MEDICINE CLINIC | Facility: HOSPITAL | Age: 76
End: 2021-02-14

## 2021-02-14 DIAGNOSIS — Z23 ENCOUNTER FOR IMMUNIZATION: Primary | ICD-10-CM

## 2021-02-14 PROCEDURE — 91300 SARS-COV-2 / COVID-19 MRNA VACCINE (PFIZER-BIONTECH) 30 MCG: CPT

## 2021-02-14 PROCEDURE — 0001A SARS-COV-2 / COVID-19 MRNA VACCINE (PFIZER-BIONTECH) 30 MCG: CPT

## 2021-03-04 ENCOUNTER — IMMUNIZATIONS (OUTPATIENT)
Dept: FAMILY MEDICINE CLINIC | Facility: HOSPITAL | Age: 76
End: 2021-03-04

## 2021-03-04 DIAGNOSIS — Z23 ENCOUNTER FOR IMMUNIZATION: Primary | ICD-10-CM

## 2021-03-04 PROCEDURE — 91300 SARS-COV-2 / COVID-19 MRNA VACCINE (PFIZER-BIONTECH) 30 MCG: CPT

## 2021-03-04 PROCEDURE — 0002A SARS-COV-2 / COVID-19 MRNA VACCINE (PFIZER-BIONTECH) 30 MCG: CPT

## 2021-04-02 ENCOUNTER — OFFICE VISIT (OUTPATIENT)
Dept: PODIATRY | Facility: CLINIC | Age: 76
End: 2021-04-02
Payer: MEDICARE

## 2021-04-02 VITALS — HEIGHT: 62 IN | RESPIRATION RATE: 17 BRPM | BODY MASS INDEX: 33.13 KG/M2 | WEIGHT: 180 LBS

## 2021-04-02 DIAGNOSIS — I70.209 PERIPHERAL ARTERIOSCLEROSIS (HCC): Primary | ICD-10-CM

## 2021-04-02 DIAGNOSIS — M79.672 PAIN IN BOTH FEET: ICD-10-CM

## 2021-04-02 DIAGNOSIS — B35.1 ONYCHOMYCOSIS: ICD-10-CM

## 2021-04-02 DIAGNOSIS — M79.671 PAIN IN BOTH FEET: ICD-10-CM

## 2021-04-02 PROCEDURE — 11721 DEBRIDE NAIL 6 OR MORE: CPT | Performed by: PODIATRIST

## 2021-04-02 NOTE — PROGRESS NOTES
Assessment/Plan:  Pain   Mycosis of nail   Paronychia   Peripheral artery disease      Plan   Foot exam performed   Patient educated on condition   All mycotic nails debrided without pain or complication      Subjective:   Patient complains of pain in her feet with ambulation   No history of trauma              Past Medical History:   Diagnosis Date    Hypertension      Macular degeneration                     Past Surgical History:   Procedure Laterality Date    INTRAOCULAR LENS INSERTION                       Allergies   Allergen Reactions    Atorvastatin Other (See Comments)       legs get stiff    Zithromax [Azithromycin]      Erythromycin Base Palpitations            Current Outpatient Prescriptions:     Calcium Carb-Cholecalciferol (CALCIUM 1000 + D) 1000-800 MG-UNIT TABS, Calcium TABS  Refills: 0  Active, Disp: , Rfl:     cholecalciferol (VITAMIN D3) 1,000 units tablet, Vitamin D TABS  Refills: 0  Active, Disp: , Rfl:     Cinnamon 500 MG capsule, Cinnamon CAPS  Refills: 0  Active, Disp: , Rfl:     cycloSPORINE (RESTASIS) 0 05 % ophthalmic emulsion, Restasis 0 05 % Ophthalmic Emulsion  Refills: 0  Active, Disp: , Rfl:     Flaxseed, Linseed, (FLAX SEED OIL) 1000 MG CAPS, Flax Seed Oil 1000 MG Oral Capsule  Refills: 0  Active, Disp: , Rfl:     fluticasone (FLONASE) 50 mcg/act nasal spray, Fluticasone Propionate 50 MCG/ACT Nasal Suspension  Quantity: 16;  Refills: 0   Started 29-Nov-2014 Active, Disp: , Rfl:     Magnesium 100 MG CAPS, Magnesium TABS  Refills: 0  Active, Disp: , Rfl:     Omega-3 Fatty Acids (FISH OIL) 645 MG CAPS, Fish Oil CAPS  Refills: 0  Active, Disp: , Rfl:     Potassium 95 MG TABS, Potassium TABS  Refills: 0  Active, Disp: , Rfl:     telmisartan (MICARDIS) 80 MG tablet, Micardis 80 MG Oral Tablet  Refills: 0  Active, Disp: , Rfl:      There is no problem list on file for this patient             Patient ID: Neisha Salazar is a 77 y  o  female      HPI     The following portions of the patient's history were reviewed and updated as appropriate: allergies, current medications, past family history, past medical history, past social history, past surgical history and problem list      Review of Systems       Objective:  Patient's shoes and socks removed    Foot ExamPhysical Exam             Physical Exam  Left Foot: Appearance: Normal except as noted: excessive mdlpnmdzuzSFj6jqd cavus  Tenderness: None except the great toe,Sw7eayllf longitudinal nszgTFe1qdjsyammt of the plantar fascia  Right Foot: Appearance: Normal except as noted: excessive rexqqpcnznHXp9fyg cavus  Tenderness: None except the medial longitudinal etakUEx4uylieeqyk of the plantar fascia  Left Ankle: ROM: limited ROM in all planes   Right Ankle: ROM: limited ROM in all planes   Neurological Exam: performed  Light touch was intact bilaterally  Vibratory sensation was intact bilaterally  Response to monofilament test was intact bilaterally  Deep tendon reflexes: patellar reflex present ataotsncfzwDZo6uaftkgvu reflex present bilaterally  Vascular Exam: performed Dorsalis pedis pulses were diminished bilaterally  Posterior tibial pulses were diminished bilaterally  Elevation Pallor: present bilaterally  Capillary refill time was greater than 3 seconds qgvgkbjclopHQy4Jyzox 8 findings bilateral negative digital hair noted all mycotic nails debrided today  Edema: mild bilaterally  These are Q, 9 findings bilateral  Toenails: All of the toenails were elongated,Rj9pcuovyooafzgt,Dj3uznvzbkmnc,Nj2zivwmiz,Fk2scatrvWLx5Odwuils  Hyperkeratosis: present on both first sub metatarsals     Shoe Gear Evaluation: Recommendation(s): custom [id number] style

## 2021-06-23 ENCOUNTER — HOSPITAL ENCOUNTER (OUTPATIENT)
Dept: RADIOLOGY | Facility: HOSPITAL | Age: 76
Discharge: HOME | End: 2021-06-23
Attending: INTERNAL MEDICINE
Payer: MEDICARE

## 2021-06-23 DIAGNOSIS — M81.0 OSTEOPOROSIS, UNSPECIFIED OSTEOPOROSIS TYPE, UNSPECIFIED PATHOLOGICAL FRACTURE PRESENCE: ICD-10-CM

## 2021-06-23 PROCEDURE — 77080 DXA BONE DENSITY AXIAL: CPT

## 2021-09-10 ENCOUNTER — HOSPITAL ENCOUNTER (OUTPATIENT)
Dept: RADIOLOGY | Facility: HOSPITAL | Age: 76
Discharge: HOME | End: 2021-09-10
Attending: INTERNAL MEDICINE
Payer: MEDICARE

## 2021-09-10 DIAGNOSIS — R52 PAIN: ICD-10-CM

## 2021-09-10 PROCEDURE — 73562 X-RAY EXAM OF KNEE 3: CPT

## 2021-09-17 ENCOUNTER — OFFICE VISIT (OUTPATIENT)
Dept: PODIATRY | Facility: CLINIC | Age: 76
End: 2021-09-17
Payer: MEDICARE

## 2021-09-17 VITALS — BODY MASS INDEX: 33.13 KG/M2 | HEIGHT: 62 IN | WEIGHT: 180 LBS | RESPIRATION RATE: 17 BRPM

## 2021-09-17 DIAGNOSIS — B35.1 ONYCHOMYCOSIS: ICD-10-CM

## 2021-09-17 DIAGNOSIS — M79.671 PAIN IN BOTH FEET: ICD-10-CM

## 2021-09-17 DIAGNOSIS — I70.209 PERIPHERAL ARTERIOSCLEROSIS (HCC): Primary | ICD-10-CM

## 2021-09-17 DIAGNOSIS — M79.672 PAIN IN BOTH FEET: ICD-10-CM

## 2021-09-17 PROCEDURE — 11721 DEBRIDE NAIL 6 OR MORE: CPT | Performed by: PODIATRIST

## 2021-09-17 NOTE — PROGRESS NOTES
Assessment/Plan:  Pain   Mycosis of nail   Paronychia   Peripheral artery disease      Plan   Foot exam performed   Patient educated on condition   All mycotic nails debrided without pain or complication      Subjective:   Patient complains of pain in her feet with ambulation   No history of trauma              Past Medical History:   Diagnosis Date    Hypertension      Macular degeneration                     Past Surgical History:   Procedure Laterality Date    INTRAOCULAR LENS INSERTION                       Allergies   Allergen Reactions    Atorvastatin Other (See Comments)       legs get stiff    Zithromax [Azithromycin]      Erythromycin Base Palpitations            Current Outpatient Prescriptions:     Calcium Carb-Cholecalciferol (CALCIUM 1000 + D) 1000-800 MG-UNIT TABS, Calcium TABS  Refills: 0  Active, Disp: , Rfl:     cholecalciferol (VITAMIN D3) 1,000 units tablet, Vitamin D TABS  Refills: 0  Active, Disp: , Rfl:     Cinnamon 500 MG capsule, Cinnamon CAPS  Refills: 0  Active, Disp: , Rfl:     cycloSPORINE (RESTASIS) 0 05 % ophthalmic emulsion, Restasis 0 05 % Ophthalmic Emulsion  Refills: 0  Active, Disp: , Rfl:     Flaxseed, Linseed, (FLAX SEED OIL) 1000 MG CAPS, Flax Seed Oil 1000 MG Oral Capsule  Refills: 0  Active, Disp: , Rfl:     fluticasone (FLONASE) 50 mcg/act nasal spray, Fluticasone Propionate 50 MCG/ACT Nasal Suspension  Quantity: 16;  Refills: 0   Started 29-Nov-2014 Active, Disp: , Rfl:     Magnesium 100 MG CAPS, Magnesium TABS  Refills: 0  Active, Disp: , Rfl:     Omega-3 Fatty Acids (FISH OIL) 645 MG CAPS, Fish Oil CAPS  Refills: 0  Active, Disp: , Rfl:     Potassium 95 MG TABS, Potassium TABS  Refills: 0  Active, Disp: , Rfl:     telmisartan (MICARDIS) 80 MG tablet, Micardis 80 MG Oral Tablet  Refills: 0  Active, Disp: , Rfl:      There is no problem list on file for this patient             Patient ID: Neisha Salazar is a 77 y  o  female      HPI     The following portions of the patient's history were reviewed and updated as appropriate: allergies, current medications, past family history, past medical history, past social history, past surgical history and problem list      Review of Systems       Objective:  Patient's shoes and socks removed    Foot ExamPhysical Exam             Physical Exam  Left Foot: Appearance: Normal except as noted: excessive iqmdzirvuvVZb2vti cavus  Tenderness: None except the great toe,Ez4ufwpxw longitudinal lgaeROo8irrfjvhxh of the plantar fascia  Right Foot: Appearance: Normal except as noted: excessive kpzyfdlzgzFPr9yqb cavus  Tenderness: None except the medial longitudinal gbiqAUm7vvszawyaq of the plantar fascia  Left Ankle: ROM: limited ROM in all planes   Right Ankle: ROM: limited ROM in all planes   Neurological Exam: performed  Light touch was intact bilaterally  Vibratory sensation was intact bilaterally  Response to monofilament test was intact bilaterally  Deep tendon reflexes: patellar reflex present qlcvzspflioHSa0faqntwls reflex present bilaterally  Vascular Exam: performed Dorsalis pedis pulses were diminished bilaterally  Posterior tibial pulses were diminished bilaterally  Elevation Pallor: present bilaterally  Capillary refill time was greater than 3 seconds cfjknznwllrCAh7Buebs 8 findings bilateral negative digital hair noted all mycotic nails debrided today  Edema: mild bilaterally   These are Q, 9 findings bilateral  Toenails: All of the toenails were elongated,Qf9jhieqswvqcysf,Ix5moaxyqbfyx,Vk3gdxvrhv,Rj8efzxdeHTt7Xkywoph  Hyperkeratosis: present on both first sub metatarsals     Shoe Gear Evaluation: Recommendation(s): custom [id number] style

## 2021-09-20 PROCEDURE — U0003 INFECTIOUS AGENT DETECTION BY NUCLEIC ACID (DNA OR RNA); SEVERE ACUTE RESPIRATORY SYNDROME CORONAVIRUS 2 (SARS-COV-2) (CORONAVIRUS DISEASE [COVID-19]), AMPLIFIED PROBE TECHNIQUE, MAKING USE OF HIGH THROUGHPUT TECHNOLOGIES AS DESCRIBED BY CMS-2020-01-R: HCPCS | Performed by: INTERNAL MEDICINE

## 2021-09-20 PROCEDURE — U0005 INFEC AGEN DETEC AMPLI PROBE: HCPCS | Performed by: INTERNAL MEDICINE

## 2021-09-22 ENCOUNTER — TELEPHONE (OUTPATIENT)
Dept: INFUSION CENTER | Facility: CLINIC | Age: 76
End: 2021-09-22

## 2021-09-23 ENCOUNTER — HOSPITAL ENCOUNTER (OUTPATIENT)
Dept: INFUSION CENTER | Facility: HOSPITAL | Age: 76
Discharge: HOME | End: 2021-09-23
Payer: MEDICARE

## 2021-09-23 VITALS
DIASTOLIC BLOOD PRESSURE: 62 MMHG | HEART RATE: 65 BPM | RESPIRATION RATE: 18 BRPM | SYSTOLIC BLOOD PRESSURE: 132 MMHG | OXYGEN SATURATION: 97 % | TEMPERATURE: 98 F

## 2021-09-23 PROCEDURE — M0245 HB BAMLAN AND ETESEV INF ADMIN: HCPCS | Performed by: INTERNAL MEDICINE

## 2021-09-23 RX ORDER — ONDANSETRON 2 MG/ML
4 INJECTION INTRAMUSCULAR; INTRAVENOUS ONCE AS NEEDED
Status: DISCONTINUED | OUTPATIENT
Start: 2021-09-23 | End: 2021-09-26 | Stop reason: HOSPADM

## 2021-09-23 RX ORDER — ALBUTEROL SULFATE 90 UG/1
3 AEROSOL, METERED RESPIRATORY (INHALATION) ONCE AS NEEDED
Status: DISCONTINUED | OUTPATIENT
Start: 2021-09-23 | End: 2021-09-26 | Stop reason: HOSPADM

## 2021-09-23 RX ORDER — AMOXICILLIN 500 MG/1
500 TABLET, FILM COATED ORAL 3 TIMES DAILY
COMMUNITY
End: 2022-05-24 | Stop reason: ALTCHOICE

## 2021-09-23 RX ORDER — SODIUM CHLORIDE 9 MG/ML
20 INJECTION, SOLUTION INTRAVENOUS CONTINUOUS
Status: DISCONTINUED | OUTPATIENT
Start: 2021-09-23 | End: 2021-09-26 | Stop reason: HOSPADM

## 2021-09-23 RX ORDER — ACETAMINOPHEN 325 MG/1
650 TABLET ORAL ONCE AS NEEDED
Status: DISCONTINUED | OUTPATIENT
Start: 2021-09-23 | End: 2021-09-26 | Stop reason: HOSPADM

## 2021-09-23 RX ADMIN — SODIUM CHLORIDE 2100 MG COMBINED: 9 INJECTION, SOLUTION INTRAVENOUS at 08:55

## 2021-09-23 RX ADMIN — SODIUM CHLORIDE 20 ML/HR: 9 INJECTION, SOLUTION INTRAVENOUS at 08:41

## 2021-09-23 NOTE — PROGRESS NOTES
Pt received monoclonal antibody infusion & observed 1hrn post tx w/out any adverse effects, offers no complaints  EUA form & d/c instructions given for review   D/C in stable condition

## 2021-12-17 PROCEDURE — 87636 SARSCOV2 & INF A&B AMP PRB: CPT | Performed by: INTERNAL MEDICINE

## 2022-01-04 ENCOUNTER — HOSPITAL ENCOUNTER (OUTPATIENT)
Dept: RADIOLOGY | Facility: HOSPITAL | Age: 77
Discharge: HOME | End: 2022-01-04
Attending: INTERNAL MEDICINE
Payer: MEDICARE

## 2022-01-04 VITALS — BODY MASS INDEX: 35.79 KG/M2 | WEIGHT: 202 LBS | HEIGHT: 63 IN

## 2022-01-04 DIAGNOSIS — Z12.31 ENCOUNTER FOR SCREENING MAMMOGRAM FOR MALIGNANT NEOPLASM OF BREAST: ICD-10-CM

## 2022-01-04 PROCEDURE — 77067 SCR MAMMO BI INCL CAD: CPT

## 2022-01-04 PROCEDURE — 77063 BREAST TOMOSYNTHESIS BI: CPT

## 2022-05-09 ENCOUNTER — OFFICE VISIT (OUTPATIENT)
Dept: PODIATRY | Facility: CLINIC | Age: 77
End: 2022-05-09
Payer: MEDICARE

## 2022-05-09 VITALS — WEIGHT: 202 LBS | RESPIRATION RATE: 16 BRPM | BODY MASS INDEX: 35.79 KG/M2 | HEIGHT: 63 IN

## 2022-05-09 DIAGNOSIS — M79.672 PAIN IN BOTH FEET: ICD-10-CM

## 2022-05-09 DIAGNOSIS — M79.671 PAIN IN BOTH FEET: ICD-10-CM

## 2022-05-09 DIAGNOSIS — I70.209 PERIPHERAL ARTERIOSCLEROSIS (HCC): Primary | ICD-10-CM

## 2022-05-09 DIAGNOSIS — B35.1 ONYCHOMYCOSIS: ICD-10-CM

## 2022-05-09 PROCEDURE — 11721 DEBRIDE NAIL 6 OR MORE: CPT | Performed by: PODIATRIST

## 2022-05-09 NOTE — PROGRESS NOTES
Assessment/Plan:  Pain   Mycosis of nail   Paronychia   Peripheral artery disease      Plan   Foot exam performed   Patient educated on condition   All mycotic nails debrided without pain or complication      Subjective:   Patient complains of pain in her feet with ambulation   No history of trauma              Past Medical History:   Diagnosis Date    Hypertension      Macular degeneration                     Past Surgical History:   Procedure Laterality Date    INTRAOCULAR LENS INSERTION                       Allergies   Allergen Reactions    Atorvastatin Other (See Comments)       legs get stiff    Zithromax [Azithromycin]      Erythromycin Base Palpitations            Current Outpatient Prescriptions:     Calcium Carb-Cholecalciferol (CALCIUM 1000 + D) 1000-800 MG-UNIT TABS, Calcium TABS  Refills: 0  Active, Disp: , Rfl:     cholecalciferol (VITAMIN D3) 1,000 units tablet, Vitamin D TABS  Refills: 0  Active, Disp: , Rfl:     Cinnamon 500 MG capsule, Cinnamon CAPS  Refills: 0  Active, Disp: , Rfl:     cycloSPORINE (RESTASIS) 0 05 % ophthalmic emulsion, Restasis 0 05 % Ophthalmic Emulsion  Refills: 0  Active, Disp: , Rfl:     Flaxseed, Linseed, (FLAX SEED OIL) 1000 MG CAPS, Flax Seed Oil 1000 MG Oral Capsule  Refills: 0  Active, Disp: , Rfl:     fluticasone (FLONASE) 50 mcg/act nasal spray, Fluticasone Propionate 50 MCG/ACT Nasal Suspension  Quantity: 16;  Refills: 0   Started 29-Nov-2014 Active, Disp: , Rfl:     Magnesium 100 MG CAPS, Magnesium TABS  Refills: 0  Active, Disp: , Rfl:     Omega-3 Fatty Acids (FISH OIL) 645 MG CAPS, Fish Oil CAPS  Refills: 0  Active, Disp: , Rfl:     Potassium 95 MG TABS, Potassium TABS  Refills: 0  Active, Disp: , Rfl:     telmisartan (MICARDIS) 80 MG tablet, Micardis 80 MG Oral Tablet  Refills: 0  Active, Disp: , Rfl:      There is no problem list on file for this patient             Patient ID: Neisha Salazar is a 76 y  o  female      HPI     The following portions of the patient's history were reviewed and updated as appropriate: allergies, current medications, past family history, past medical history, past social history, past surgical history and problem list      Review of Systems       Objective:  Patient's shoes and socks removed    Foot ExamPhysical Exam             Physical Exam  Left Foot: Appearance: Normal except as noted: excessive ioaktxozsiDWb0srh cavus  Tenderness: None except the great toe,Yw1ospylx longitudinal ultwUPl8wxehvpshb of the plantar fascia  Right Foot: Appearance: Normal except as noted: excessive eectkprjaaMTw6szh cavus  Tenderness: None except the medial longitudinal qgkpLLr5punnbfasb of the plantar fascia  Left Ankle: ROM: limited ROM in all planes   Right Ankle: ROM: limited ROM in all planes   Neurological Exam: performed  Light touch was intact bilaterally  Vibratory sensation was intact bilaterally  Response to monofilament test was intact bilaterally  Deep tendon reflexes: patellar reflex present nobzfyqdknyNXm0lvjxntuj reflex present bilaterally  Vascular Exam: performed Dorsalis pedis pulses were diminished bilaterally  Posterior tibial pulses were diminished bilaterally  Elevation Pallor: present bilaterally  Capillary refill time was greater than 3 seconds cjhtdwsinuqXKm6Vgset 8 findings bilateral negative digital hair noted all mycotic nails debrided today  Edema: mild bilaterally   These are Q, 9 findings bilateral  Toenails: All of the toenails were elongated,Ie6dqmpgpvtarktp,Gw4lbyynvjtuj,Zd5mdgtcoo,Yq3wnbdbgKCi2Apzzvgi  Hyperkeratosis: present on both first sub metatarsals     Shoe Gear Evaluation: Recommendation(s): custom [id number] style

## 2022-05-24 ENCOUNTER — TELEPHONE (OUTPATIENT)
Dept: GASTROENTEROLOGY | Facility: CLINIC | Age: 77
End: 2022-05-24

## 2022-05-24 ENCOUNTER — OFFICE VISIT (OUTPATIENT)
Dept: GASTROENTEROLOGY | Facility: CLINIC | Age: 77
End: 2022-05-24
Payer: MEDICARE

## 2022-05-24 VITALS
DIASTOLIC BLOOD PRESSURE: 74 MMHG | SYSTOLIC BLOOD PRESSURE: 113 MMHG | WEIGHT: 199 LBS | HEART RATE: 70 BPM | HEIGHT: 62 IN | BODY MASS INDEX: 36.62 KG/M2

## 2022-05-24 DIAGNOSIS — Z86.010 HISTORY OF COLON POLYPS: Primary | ICD-10-CM

## 2022-05-24 DIAGNOSIS — K64.8 INTERNAL HEMORRHOIDS: ICD-10-CM

## 2022-05-24 DIAGNOSIS — K57.90 DIVERTICULOSIS: ICD-10-CM

## 2022-05-24 DIAGNOSIS — Z87.19 HISTORY OF ESOPHAGEAL STRICTURE: ICD-10-CM

## 2022-05-24 DIAGNOSIS — K59.00 CONSTIPATION, UNSPECIFIED CONSTIPATION TYPE: ICD-10-CM

## 2022-05-24 DIAGNOSIS — K62.5 RECTAL BLEEDING: ICD-10-CM

## 2022-05-24 PROCEDURE — 99213 OFFICE O/P EST LOW 20 MIN: CPT | Performed by: NURSE PRACTITIONER

## 2022-05-24 RX ORDER — HYDROCORTISONE ACETATE PRAMOXINE HCL 2.5; 1 G/100G; G/100G
CREAM TOPICAL 3 TIMES DAILY
Qty: 4 G | Refills: 0 | Status: SHIPPED | OUTPATIENT
Start: 2022-05-24

## 2022-05-24 RX ORDER — POLYETHYLENE GLYCOL 3350, SODIUM SULFATE ANHYDROUS, SODIUM BICARBONATE, SODIUM CHLORIDE, POTASSIUM CHLORIDE 236; 22.74; 6.74; 5.86; 2.97 G/4L; G/4L; G/4L; G/4L; G/4L
4000 POWDER, FOR SOLUTION ORAL ONCE
Qty: 4000 ML | Refills: 0 | Status: SHIPPED | OUTPATIENT
Start: 2022-05-24 | End: 2022-06-02 | Stop reason: ALTCHOICE

## 2022-05-24 NOTE — TELEPHONE ENCOUNTER
[facility] Assessment    Name: Ronan Fontenot  YOB: 1945  Last Height: 5' 2" (1 575 m)  Last weight: 90 3 kg (199 lb)  BMI: 36 40 kg/m²  Procedure: colon  Diagnosis: see orders  Date of procedure: 6/2/22  Prep: golytely  Responsible : yes  Phone#: [phone]  Name completing form: Avni Blanton  Date form completed: 05/24/22      If the patient answers yes to any of these questions, schedule in a hospital  Are you pregnant: No  Do you rely on a wheelchair for mobility: No  Have you been diagnosed with End Stage Renal Disease (ESRD): No  Do you need oxygen during the day: No  Have you had a heart attack or stroke within the past three months: No  Have you had a seizure within the past three months: No  Have you ever been informed by anesthesia that you have a difficult airway: No  Additional Questions  Have you had any cardiac testing or are under the care of a Cardiologist (see cardiac list): No  Cardiac list:   Do you have an implanted cardiac defibrillator: No (Comment:  This patient should be scheduled in the hospital)    Have any bleeding problems, such as anemia or hemophilia (If patient has H&H result below 8, schedule in hospital   H&H must be within 30 days of procedure): No    Had an organ transplant within the past 3 months: No    Do you have any present infections: No  Do you get short of breath when walking a few blocks: No  Have you been diagnosed with diabetes: No  Comments (provide cardiac provider information if applicable):

## 2022-05-24 NOTE — PATIENT INSTRUCTIONS
You have been prescribed miralax (polyethylene glycol) for treatment of your CONSTIPATION  Start 1 capful daily  Take this dose x 3 days  If your symptoms are improved, great! Continue this dose daily  If you have diarrhea (stools are loose, associated with accidents, or urgency) with this dose, cut down to 1/2 capful daily  Continue to titrate down until you find the dose for you  This might be 1 tbsp daily  Wait 3 days before making a change  If you have continued constipation with 1 capful daily, you may need a higher dose  Start with 1 5 capfuls daily and titrate upward  Eg might need 1 capful twice daily  There is no maximum dose, whatever works for you  Again, wait 3 days before making a change

## 2022-05-24 NOTE — PROGRESS NOTES
[facility] - Outpatient Follow-up Note  Adebayo Ragsdale 69 y o  female MRN: [id number]  Encounter: [id number]          ASSESSMENT AND PLAN:      1  History of colon polyps  History of 2 tubular adenoma polyps removed on last colonoscopy in 2015, she was recommended for repeat colonoscopy in 3 years however she deferred this due to [place], son's illness, sister's death  Colonoscopy scheduled for surveillance with GoLYTELY prep  Prep and procedure explained  -     Colonoscopy; Future; Expected date: 05/24/2022  -     polyethylene glycol (Golytely) 4000 mL solution; Take 4,000 mL by mouth once for 1 dose Take 4000 mL by mouth once for 1 dose  Use as directed    2  Constipation, unspecified constipation type  3  Rectal bleeding  4  Internal hemorrhoids  5  Diverticulosis  Patient reports she was having spastic diarrhea but recently has been experiencing constipation not moving her bowels for 2-3 days at a time  She is having intermittent rectal bleeding with wiping as well as rectal discomfort  She is currently taking a fiber pill, stool softener, and over-the-counter hemorrhoid cream   Rectal bleeding likely secondary to internal hemorrhoids, but must rule out colonic lesion as she is overdue for colon cancer screening      -continue fiber supplementation, stool softener  -start MiraLax daily and titrate to effect  -start Anal-pram rectal cream 3 times daily x7 days  -colonoscopy ordered as above      -     Colonoscopy; Future; Expected date: 05/24/2022  -     hydrocortisone-pramoxine [facility]) 2 5-1 % rectal cream; Apply topically 3 (three) times a day    6  Esophageal stricture  Patient with history of GE junction erosions low-grade stricture status post dilation w/ 44 Bougie in 2012, lower esophageal biopsy showed mild-to-moderate chronic inflammation, negative for H pylori, eosinophilic esophagitis, intestinal metaplasia    Reports having reflux on recurrence with dietary discretion, denies dysphagia     -continue conservative measures for treatment of reflux including anti for reflux diet/lifestyle modifications  -advised patient to let us know if she develops recurrent dysphagia and then can consider repeat EGD with dilation at that time  ______________________________________________________________________    SUBJECTIVE:  Berto Luz is a 69 y o  female with history of HTN, macular generation, colon polyps who presents for follow up for consultation to colonoscopy  Last colonoscopy was in  and she had 2 tubular adenomas removed at that time and she was scheduled for repeat colonoscopy in 2019 however colonoscopy was deferred due to [place] and then her son got sick and sister  in a motorcycle accident  She's having issues with her hemorrhoids, intermittent rectal bleeding and discomfort  She's been using tucks wipes, OTC hemorrhoid cream  Taking fiber pill and stool softener  She was having spastic diarrhea previously now constipation moving her bowels every 2-3 days  Denies any unintended weight loss  Denies any family history of colon cancer  Has rare heartburn with dairy, popcorn  Denies any dysphagia  Had esophagus dilated in the past         Answers for HPI/ROS submitted by the patient on 2022  anorexia: No  arthralgias: No  belching: Yes  constipation: Yes  dysuria: No  fever: No  flatus: No  frequency: Yes  headaches: No  hematochezia: Yes  hematuria: No  melena: No  myalgias: No  nausea: No  weight loss: No  vomiting: No  Aggravated by: nothing  Relieved by: bowel movements, passing flatus          REVIEW OF SYSTEMS IS OTHERWISE NEGATIVE        Historical Information   Past Medical History:   Diagnosis Date    Hypertension     Macular degeneration      Past Surgical History:   Procedure Laterality Date    INTRAOCULAR LENS INSERTION       Social History   Social History     Substance and Sexual Activity   Alcohol Use No     Social History     Substance and Sexual Activity   Drug Use No     Social History     Tobacco Use   Smoking Status Former Smoker   Smokeless Tobacco Never Used     Family History   Problem Relation Age of Onset    No Known Problems Mother     Colon cancer Father     No Known Problems Sister     Esophageal cancer Maternal Grandmother     No Known Problems Paternal Grandmother     No Known Problems Sister     Breast cancer Maternal Aunt 79    No Known Problems Maternal Aunt     No Known Problems Maternal Aunt     No Known Problems Maternal Aunt     Skin cancer Paternal Aunt     No Known Problems Paternal Aunt     No Known Problems Paternal Aunt     Cancer Paternal Aunt     Breast cancer Cousin 68       Meds/Allergies       Current Outpatient Medications:     Calcium Carb-Cholecalciferol 1000-800 MG-UNIT TABS    cholecalciferol (VITAMIN D3) 1,000 units tablet    Cinnamon 500 MG capsule    Flaxseed, Linseed, (FLAX SEED OIL) 1000 MG CAPS    hydrocortisone-pramoxine (ANALPRAM-HC) 2 5-1 % rectal cream    Omega-3 Fatty Acids (FISH OIL) 645 MG CAPS    polyethylene glycol (Golytely) 4000 mL solution    Potassium 95 MG TABS    telmisartan (MICARDIS) 80 MG tablet    cycloSPORINE (RESTASIS) 0 05 % ophthalmic emulsion    fluticasone (FLONASE) 50 mcg/act nasal spray    Magnesium 100 MG CAPS    Allergies   Allergen Reactions    Atorvastatin Other (See Comments)     legs get stiff    Zithromax [Azithromycin]     Erythromycin Base Palpitations           Objective     Blood pressure 113/74, pulse 70, height 5' 2" (1 575 m), weight 90 3 kg (199 lb)  Body mass index is 36 4 kg/m²  PHYSICAL EXAM:      General Appearance:   Alert, cooperative, no distress   HEENT:   Normocephalic, atraumatic, anicteric  Neck:  Supple, symmetrical, trachea midline   Lungs:   Clear to auscultation bilaterally; no rales, rhonchi or wheezing; respirations unlabored    Heart[de-identified]   Regular rate and rhythm; no murmur     Abdomen:   Soft, non-tender, non-distended; normal bowel sounds; no masses, no organomegaly    Genitalia:   Deferred    Rectal:   Deferred    Extremities:  No cyanosis, clubbing or edema    Skin:  No jaundice, rashes, or lesions    Lymph nodes:  No palpable cervical lymphadenopathy        Lab Results:   No visits with results within 1 Day(s) from this visit  Latest known visit with results is:   Orders Only on 12/17/2021   Component Date Value    SARS-CoV-2 12/17/2021 Negative     INFLUENZA A PCR 12/17/2021 Negative     INFLUENZA B PCR 12/17/2021 Negative          Radiology Results:   No results found

## 2022-05-24 NOTE — H&P (VIEW-ONLY)
[facility] - Outpatient Follow-up Note  Elna Meigs 69 y o  female MRN: [id number]  Encounter: [id number]          ASSESSMENT AND PLAN:      1  History of colon polyps  History of 2 tubular adenoma polyps removed on last colonoscopy in 2015, she was recommended for repeat colonoscopy in 3 years however she deferred this due to [place], son's illness, sister's death  Colonoscopy scheduled for surveillance with GoLYTELY prep  Prep and procedure explained  -     Colonoscopy; Future; Expected date: 05/24/2022  -     polyethylene glycol (Golytely) 4000 mL solution; Take 4,000 mL by mouth once for 1 dose Take 4000 mL by mouth once for 1 dose  Use as directed    2  Constipation, unspecified constipation type  3  Rectal bleeding  4  Internal hemorrhoids  5  Diverticulosis  Patient reports she was having spastic diarrhea but recently has been experiencing constipation not moving her bowels for 2-3 days at a time  She is having intermittent rectal bleeding with wiping as well as rectal discomfort  She is currently taking a fiber pill, stool softener, and over-the-counter hemorrhoid cream   Rectal bleeding likely secondary to internal hemorrhoids, but must rule out colonic lesion as she is overdue for colon cancer screening      -continue fiber supplementation, stool softener  -start MiraLax daily and titrate to effect  -start Anal-pram rectal cream 3 times daily x7 days  -colonoscopy ordered as above      -     Colonoscopy; Future; Expected date: 05/24/2022  -     hydrocortisone-pramoxine [facility]) 2 5-1 % rectal cream; Apply topically 3 (three) times a day    6  Esophageal stricture  Patient with history of GE junction erosions low-grade stricture status post dilation w/ 44 Bougie in 2012, lower esophageal biopsy showed mild-to-moderate chronic inflammation, negative for H pylori, eosinophilic esophagitis, intestinal metaplasia    Reports having reflux on recurrence with dietary discretion, denies dysphagia     -continue conservative measures for treatment of reflux including anti for reflux diet/lifestyle modifications  -advised patient to let us know if she develops recurrent dysphagia and then can consider repeat EGD with dilation at that time  ______________________________________________________________________    SUBJECTIVE:  Evens Cummins is a 69 y o  female with history of HTN, macular generation, colon polyps who presents for follow up for consultation to colonoscopy  Last colonoscopy was in  and she had 2 tubular adenomas removed at that time and she was scheduled for repeat colonoscopy in 2019 however colonoscopy was deferred due to [place] and then her son got sick and sister  in a motorcycle accident  She's having issues with her hemorrhoids, intermittent rectal bleeding and discomfort  She's been using tucks wipes, OTC hemorrhoid cream  Taking fiber pill and stool softener  She was having spastic diarrhea previously now constipation moving her bowels every 2-3 days  Denies any unintended weight loss  Denies any family history of colon cancer  Has rare heartburn with dairy, popcorn  Denies any dysphagia  Had esophagus dilated in the past         Answers for HPI/ROS submitted by the patient on 2022  anorexia: No  arthralgias: No  belching: Yes  constipation: Yes  dysuria: No  fever: No  flatus: No  frequency: Yes  headaches: No  hematochezia: Yes  hematuria: No  melena: No  myalgias: No  nausea: No  weight loss: No  vomiting: No  Aggravated by: nothing  Relieved by: bowel movements, passing flatus          REVIEW OF SYSTEMS IS OTHERWISE NEGATIVE        Historical Information   Past Medical History:   Diagnosis Date    Hypertension     Macular degeneration      Past Surgical History:   Procedure Laterality Date    INTRAOCULAR LENS INSERTION       Social History   Social History     Substance and Sexual Activity   Alcohol Use No     Social History     Substance and Sexual Activity   Drug Use No     Social History     Tobacco Use   Smoking Status Former Smoker   Smokeless Tobacco Never Used     Family History   Problem Relation Age of Onset    No Known Problems Mother     Colon cancer Father     No Known Problems Sister     Esophageal cancer Maternal Grandmother     No Known Problems Paternal Grandmother     No Known Problems Sister     Breast cancer Maternal Aunt 79    No Known Problems Maternal Aunt     No Known Problems Maternal Aunt     No Known Problems Maternal Aunt     Skin cancer Paternal Aunt     No Known Problems Paternal Aunt     No Known Problems Paternal Aunt     Cancer Paternal Aunt     Breast cancer Cousin 68       Meds/Allergies       Current Outpatient Medications:     Calcium Carb-Cholecalciferol 1000-800 MG-UNIT TABS    cholecalciferol (VITAMIN D3) 1,000 units tablet    Cinnamon 500 MG capsule    Flaxseed, Linseed, (FLAX SEED OIL) 1000 MG CAPS    hydrocortisone-pramoxine (ANALPRAM-HC) 2 5-1 % rectal cream    Omega-3 Fatty Acids (FISH OIL) 645 MG CAPS    polyethylene glycol (Golytely) 4000 mL solution    Potassium 95 MG TABS    telmisartan (MICARDIS) 80 MG tablet    cycloSPORINE (RESTASIS) 0 05 % ophthalmic emulsion    fluticasone (FLONASE) 50 mcg/act nasal spray    Magnesium 100 MG CAPS    Allergies   Allergen Reactions    Atorvastatin Other (See Comments)     legs get stiff    Zithromax [Azithromycin]     Erythromycin Base Palpitations           Objective     Blood pressure 113/74, pulse 70, height 5' 2" (1 575 m), weight 90 3 kg (199 lb)  Body mass index is 36 4 kg/m²  PHYSICAL EXAM:      General Appearance:   Alert, cooperative, no distress   HEENT:   Normocephalic, atraumatic, anicteric  Neck:  Supple, symmetrical, trachea midline   Lungs:   Clear to auscultation bilaterally; no rales, rhonchi or wheezing; respirations unlabored    Heart[de-identified]   Regular rate and rhythm; no murmur     Abdomen:   Soft, non-tender, non-distended; normal bowel sounds; no masses, no organomegaly    Genitalia:   Deferred    Rectal:   Deferred    Extremities:  No cyanosis, clubbing or edema    Skin:  No jaundice, rashes, or lesions    Lymph nodes:  No palpable cervical lymphadenopathy        Lab Results:   No visits with results within 1 Day(s) from this visit  Latest known visit with results is:   Orders Only on 12/17/2021   Component Date Value    SARS-CoV-2 12/17/2021 Negative     INFLUENZA A PCR 12/17/2021 Negative     INFLUENZA B PCR 12/17/2021 Negative          Radiology Results:   No results found

## 2022-05-26 ENCOUNTER — TELEPHONE (OUTPATIENT)
Dept: GASTROENTEROLOGY | Facility: CLINIC | Age: 77
End: 2022-05-26

## 2022-05-26 NOTE — TELEPHONE ENCOUNTER
Called and spoke with pt  She is confirmed for her procedure  She has her prep instructions  She is aware the procedure center will call her the day before with arrival time

## 2022-06-02 ENCOUNTER — ANESTHESIA EVENT (OUTPATIENT)
Dept: GASTROENTEROLOGY | Facility: AMBULATORY SURGERY CENTER | Age: 77
End: 2022-06-02

## 2022-06-02 ENCOUNTER — ANESTHESIA (OUTPATIENT)
Dept: GASTROENTEROLOGY | Facility: AMBULATORY SURGERY CENTER | Age: 77
End: 2022-06-02

## 2022-06-02 ENCOUNTER — HOSPITAL ENCOUNTER (OUTPATIENT)
Dept: GASTROENTEROLOGY | Facility: AMBULATORY SURGERY CENTER | Age: 77
Discharge: HOME | End: 2022-06-02
Payer: MEDICARE

## 2022-06-02 VITALS
DIASTOLIC BLOOD PRESSURE: 86 MMHG | BODY MASS INDEX: 35.44 KG/M2 | HEART RATE: 68 BPM | RESPIRATION RATE: 18 BRPM | OXYGEN SATURATION: 98 % | TEMPERATURE: 97.5 F | HEIGHT: 63 IN | WEIGHT: 200 LBS | SYSTOLIC BLOOD PRESSURE: 154 MMHG

## 2022-06-02 DIAGNOSIS — K62.5 RECTAL BLEEDING: ICD-10-CM

## 2022-06-02 DIAGNOSIS — Z86.010 HISTORY OF COLON POLYPS: ICD-10-CM

## 2022-06-02 PROCEDURE — 45385 COLONOSCOPY W/LESION REMOVAL: CPT | Performed by: INTERNAL MEDICINE

## 2022-06-02 PROCEDURE — 88305 TISSUE EXAM BY PATHOLOGIST: CPT | Performed by: PATHOLOGY

## 2022-06-02 PROCEDURE — 45380 COLONOSCOPY AND BIOPSY: CPT | Performed by: INTERNAL MEDICINE

## 2022-06-02 RX ORDER — SODIUM CHLORIDE, SODIUM LACTATE, POTASSIUM CHLORIDE, CALCIUM CHLORIDE 600; 310; 30; 20 MG/100ML; MG/100ML; MG/100ML; MG/100ML
INJECTION, SOLUTION INTRAVENOUS CONTINUOUS PRN
Status: DISCONTINUED | OUTPATIENT
Start: 2022-06-02 | End: 2022-06-02

## 2022-06-02 RX ORDER — LIDOCAINE HYDROCHLORIDE 10 MG/ML
INJECTION, SOLUTION EPIDURAL; INFILTRATION; INTRACAUDAL; PERINEURAL AS NEEDED
Status: DISCONTINUED | OUTPATIENT
Start: 2022-06-02 | End: 2022-06-02

## 2022-06-02 RX ORDER — PROPOFOL 10 MG/ML
INJECTION, EMULSION INTRAVENOUS AS NEEDED
Status: DISCONTINUED | OUTPATIENT
Start: 2022-06-02 | End: 2022-06-02

## 2022-06-02 RX ADMIN — PROPOFOL 50 MG: 10 INJECTION, EMULSION INTRAVENOUS at 09:22

## 2022-06-02 RX ADMIN — LIDOCAINE HYDROCHLORIDE 50 MG: 10 INJECTION, SOLUTION EPIDURAL; INFILTRATION; INTRACAUDAL; PERINEURAL at 09:04

## 2022-06-02 RX ADMIN — PROPOFOL 100 MG: 10 INJECTION, EMULSION INTRAVENOUS at 09:04

## 2022-06-02 RX ADMIN — PROPOFOL 50 MG: 10 INJECTION, EMULSION INTRAVENOUS at 09:11

## 2022-06-02 RX ADMIN — PROPOFOL 50 MG: 10 INJECTION, EMULSION INTRAVENOUS at 09:13

## 2022-06-02 RX ADMIN — SODIUM CHLORIDE, SODIUM LACTATE, POTASSIUM CHLORIDE, CALCIUM CHLORIDE: 600; 310; 30; 20 INJECTION, SOLUTION INTRAVENOUS at 08:33

## 2022-06-02 RX ADMIN — PROPOFOL 50 MG: 10 INJECTION, EMULSION INTRAVENOUS at 09:18

## 2022-06-02 RX ADMIN — PROPOFOL 50 MG: 10 INJECTION, EMULSION INTRAVENOUS at 09:28

## 2022-06-02 RX ADMIN — PROPOFOL 50 MG: 10 INJECTION, EMULSION INTRAVENOUS at 09:33

## 2022-06-02 RX ADMIN — PROPOFOL 50 MG: 10 INJECTION, EMULSION INTRAVENOUS at 09:08

## 2022-06-02 RX ADMIN — PROPOFOL 50 MG: 10 INJECTION, EMULSION INTRAVENOUS at 09:25

## 2022-06-02 NOTE — INTERVAL H&P NOTE
H&P reviewed  After examining the patient I find no changes in the patients condition since the H&P had been written      Vitals:    06/02/22 0839   BP: 119/74   Pulse: 77   Resp: 18   Temp: 97 5 °F (36 4 °C)   SpO2: 98%

## 2022-06-02 NOTE — ANESTHESIA PREPROCEDURE EVALUATION
Procedure:  COLONOSCOPY    Relevant Problems   No relevant active problems        Physical Exam    Airway    Mallampati score: II  TM Distance: >3 FB  Neck ROM: full     Dental       Cardiovascular      Pulmonary      Other Findings        Anesthesia Plan  ASA Score- 2     Anesthesia Type- IV sedation with anesthesia with ASA Monitors  Additional Monitors:   Airway Plan:           Plan Factors-Exercise tolerance (METS): >4 METS  Chart reviewed  EKG reviewed  Imaging results reviewed  Existing labs reviewed  Patient summary reviewed  Patient is not a current smoker  Patient did not smoke on day of surgery  Obstructive sleep apnea risk education given perioperatively  Induction- intravenous  Postoperative Plan-     Informed Consent- Anesthetic plan and risks discussed with patient  I personally reviewed this patient with the CRNA  Discussed and agreed on the Anesthesia Plan with the CRNA             NPO appropriate  Discussed benefits/risks of monitored anesthetic care and discussed providing a dynamic level of mild to deep sedation  Risks include awareness, airway obstruction, aspiration which may necessitate conversion to general anesthesia  All questions answered  Patient understands and wishes to proceed  Anesthesia plan and consent discussed with Liudmila Sy who expressed understanding and agreement  Risks/benefits and alternatives discussed with patient including possible PONV, sore throat, damage to teeth/lips/gums and possibility of rare anesthetic and surgical emergencies

## 2022-06-02 NOTE — ANESTHESIA POSTPROCEDURE EVALUATION
Post-Op Assessment Note    CV Status:  Stable  Pain Score: 0    Pain management: adequate     Mental Status:  Sleepy   Hydration Status:  Euvolemic   PONV Controlled:  Controlled   Airway Patency:  Patent      Post Op Vitals Reviewed: Yes      Staff: Anesthesiologist, CRNA         No complications documented      /62 (06/02/22 0945)    Temp      Pulse 65 (06/02/22 0945)   Resp 14 (06/02/22 0945)    SpO2 98 % (06/02/22 0945)

## 2022-06-15 ENCOUNTER — TELEPHONE (OUTPATIENT)
Dept: OTHER | Facility: OTHER | Age: 77
End: 2022-06-15

## 2022-06-15 NOTE — TELEPHONE ENCOUNTER
Biopsies aren't back yet  Advised pt we would call with results when biopsies return and MD has had a chance to review  She can check back in a week if she doesn't hear anything

## 2022-07-05 ENCOUNTER — HOSPITAL ENCOUNTER (OUTPATIENT)
Dept: RADIOLOGY | Facility: HOSPITAL | Age: 77
Discharge: HOME | End: 2022-07-05
Attending: SURGERY
Payer: MEDICARE

## 2022-07-05 DIAGNOSIS — R19.7 DIARRHEA OF PRESUMED INFECTIOUS ORIGIN: ICD-10-CM

## 2022-07-05 PROCEDURE — 74176 CT ABD & PELVIS W/O CONTRAST: CPT

## 2022-07-05 PROCEDURE — G1004 CDSM NDSC: HCPCS

## 2022-09-23 ENCOUNTER — OFFICE VISIT (OUTPATIENT)
Dept: PODIATRY | Facility: CLINIC | Age: 77
End: 2022-09-23
Payer: MEDICARE

## 2022-09-23 VITALS — RESPIRATION RATE: 17 BRPM | BODY MASS INDEX: 35.44 KG/M2 | WEIGHT: 200 LBS | HEIGHT: 63 IN

## 2022-09-23 DIAGNOSIS — M79.671 PAIN IN BOTH FEET: ICD-10-CM

## 2022-09-23 DIAGNOSIS — B35.1 ONYCHOMYCOSIS: ICD-10-CM

## 2022-09-23 DIAGNOSIS — I70.209 PERIPHERAL ARTERIOSCLEROSIS (HCC): Primary | ICD-10-CM

## 2022-09-23 DIAGNOSIS — M79.672 PAIN IN BOTH FEET: ICD-10-CM

## 2022-09-23 PROCEDURE — 11721 DEBRIDE NAIL 6 OR MORE: CPT | Performed by: PODIATRIST

## 2022-09-23 NOTE — PROGRESS NOTES
Assessment/Plan:  Pain   Mycosis of nail   Paronychia   Peripheral artery disease      Plan   Foot exam performed   Patient educated on condition   All mycotic nails debrided without pain or complication      Subjective:   Patient complains of pain in her feet with ambulation   No history of trauma              Past Medical History:   Diagnosis Date    Hypertension      Macular degeneration                     Past Surgical History:   Procedure Laterality Date    INTRAOCULAR LENS INSERTION                       Allergies   Allergen Reactions    Atorvastatin Other (See Comments)       legs get stiff    Zithromax [Azithromycin]      Erythromycin Base Palpitations            Current Outpatient Prescriptions:     Calcium Carb-Cholecalciferol (CALCIUM 1000 + D) 1000-800 MG-UNIT TABS, Calcium TABS  Refills: 0  Active, Disp: , Rfl:     cholecalciferol (VITAMIN D3) 1,000 units tablet, Vitamin D TABS  Refills: 0  Active, Disp: , Rfl:     Cinnamon 500 MG capsule, Cinnamon CAPS  Refills: 0  Active, Disp: , Rfl:     cycloSPORINE (RESTASIS) 0 05 % ophthalmic emulsion, Restasis 0 05 % Ophthalmic Emulsion  Refills: 0  Active, Disp: , Rfl:     Flaxseed, Linseed, (FLAX SEED OIL) 1000 MG CAPS, Flax Seed Oil 1000 MG Oral Capsule  Refills: 0  Active, Disp: , Rfl:     fluticasone (FLONASE) 50 mcg/act nasal spray, Fluticasone Propionate 50 MCG/ACT Nasal Suspension  Quantity: 16;  Refills: 0   Started 29-Nov-2014 Active, Disp: , Rfl:     Magnesium 100 MG CAPS, Magnesium TABS  Refills: 0  Active, Disp: , Rfl:     Omega-3 Fatty Acids (FISH OIL) 645 MG CAPS, Fish Oil CAPS  Refills: 0  Active, Disp: , Rfl:     Potassium 95 MG TABS, Potassium TABS  Refills: 0  Active, Disp: , Rfl:     telmisartan (MICARDIS) 80 MG tablet, Micardis 80 MG Oral Tablet  Refills: 0  Active, Disp: , Rfl:      There is no problem list on file for this patient             Patient ID: Neisha Salazar is a 76 y  o  female      HPI     The following portions of the patient's history were reviewed and updated as appropriate: allergies, current medications, past family history, past medical history, past social history, past surgical history and problem list      Review of Systems       Objective:  Patient's shoes and socks removed    Foot ExamPhysical Exam             Physical Exam  Left Foot: Appearance: Normal except as noted: excessive roklsrsellVOd8bna cavus  Tenderness: None except the great toe,Gq7tbbaid longitudinal eyypOYn1wsibkxwbv of the plantar fascia  Right Foot: Appearance: Normal except as noted: excessive epxlpumtobIDe7mpp cavus  Tenderness: None except the medial longitudinal oqrdKRm2ofxbryode of the plantar fascia  Left Ankle: ROM: limited ROM in all planes   Right Ankle: ROM: limited ROM in all planes   Neurological Exam: performed  Light touch was intact bilaterally  Vibratory sensation was intact bilaterally  Response to monofilament test was intact bilaterally  Deep tendon reflexes: patellar reflex present xsnarnoyhhbWYp2zmhrgrdy reflex present bilaterally  Vascular Exam: performed Dorsalis pedis pulses were diminished bilaterally  Posterior tibial pulses were diminished bilaterally  Elevation Pallor: present bilaterally  Capillary refill time was greater than 3 seconds cwnzndjjtqpAPb5Owlcz 8 findings bilateral negative digital hair noted all mycotic nails debrided today  Edema: mild bilaterally   These are Q, 9 findings bilateral  Toenails: All of the toenails were elongated,Cl7pgzyusenvztzc,Vt9dflxkwaxni,Vw0lwuuusy,Sp5moywwlHYg7Sjoinox  Hyperkeratosis: present on both first sub metatarsals     Shoe Gear Evaluation: Recommendation(s): custom [id number] style               Assessment/Plan:  Pain   Mycosis of nail   Paronychia   Peripheral artery disease      Plan   Foot exam performed   Patient educated on condition   All mycotic nails debrided without pain or complication      Subjective:   Patient complains of pain in her feet with ambulation   No history of trauma              Past Medical History:   Diagnosis Date    Hypertension      Macular degeneration                     Past Surgical History:   Procedure Laterality Date    INTRAOCULAR LENS INSERTION                       Allergies   Allergen Reactions    Atorvastatin Other (See Comments)       legs get stiff    Zithromax [Azithromycin]      Erythromycin Base Palpitations            Current Outpatient Prescriptions:     Calcium Carb-Cholecalciferol (CALCIUM 1000 + D) 1000-800 MG-UNIT TABS, Calcium TABS  Refills: 0  Active, Disp: , Rfl:     cholecalciferol (VITAMIN D3) 1,000 units tablet, Vitamin D TABS  Refills: 0  Active, Disp: , Rfl:     Cinnamon 500 MG capsule, Cinnamon CAPS  Refills: 0  Active, Disp: , Rfl:     cycloSPORINE (RESTASIS) 0 05 % ophthalmic emulsion, Restasis 0 05 % Ophthalmic Emulsion  Refills: 0  Active, Disp: , Rfl:     Flaxseed, Linseed, (FLAX SEED OIL) 1000 MG CAPS, Flax Seed Oil 1000 MG Oral Capsule  Refills: 0  Active, Disp: , Rfl:     fluticasone (FLONASE) 50 mcg/act nasal spray, Fluticasone Propionate 50 MCG/ACT Nasal Suspension  Quantity: 16;  Refills: 0   Started 29-Nov-2014 Active, Disp: , Rfl:     Magnesium 100 MG CAPS, Magnesium TABS  Refills: 0  Active, Disp: , Rfl:     Omega-3 Fatty Acids (FISH OIL) 645 MG CAPS, Fish Oil CAPS  Refills: 0  Active, Disp: , Rfl:     Potassium 95 MG TABS, Potassium TABS  Refills: 0  Active, Disp: , Rfl:     telmisartan (MICARDIS) 80 MG tablet, Micardis 80 MG Oral Tablet  Refills: 0  Active, Disp: , Rfl:      There is no problem list on file for this patient             Patient ID: Neisha Salazar is a 76 y  o  female      HPI     The following portions of the patient's history were reviewed and updated as appropriate: allergies, current medications, past family history, past medical history, past social history, past surgical history and problem list      Review of Systems       Objective:  Patient's shoes and socks removed    Foot ExamPhysical Exam             Physical Exam  Left Foot: Appearance: Normal except as noted: excessive omzxqphvrtZOr9rfg cavus  Tenderness: None except the great toe,Ls0xymqsr longitudinal fujeZUh3trbzifdsl of the plantar fascia  Right Foot: Appearance: Normal except as noted: excessive rxfsiychcwECg0edc cavus  Tenderness: None except the medial longitudinal uvxoVEx0anvafjdeg of the plantar fascia  Left Ankle: ROM: limited ROM in all planes   Right Ankle: ROM: limited ROM in all planes   Neurological Exam: performed  Light touch was intact bilaterally  Vibratory sensation was intact bilaterally  Response to monofilament test was intact bilaterally  Deep tendon reflexes: patellar reflex present vmzurovgfzrKTk3fdpnahee reflex present bilaterally  Vascular Exam: performed Dorsalis pedis pulses were diminished bilaterally  Posterior tibial pulses were diminished bilaterally  Elevation Pallor: present bilaterally  Capillary refill time was greater than 3 seconds nztgydcinsqSQl0Kjhdm 8 findings bilateral negative digital hair noted all mycotic nails debrided today  Edema: mild bilaterally   These are Q, 9 findings bilateral  Toenails: All of the toenails were elongated,Oq7mtvuahsadjtyp,Fj4gmsgwnlwtd,Ub5cpdncmt,Pi6rvftrgBWz2Hyteuow  Hyperkeratosis: present on both first sub metatarsals     Shoe Gear Evaluation: Recommendation(s): custom ocqaalLUh7BPO style

## 2022-10-05 ENCOUNTER — TELEPHONE (OUTPATIENT)
Dept: FAMILY MEDICINE CLINIC | Facility: CLINIC | Age: 77
End: 2022-10-05

## 2022-10-20 ENCOUNTER — OFFICE VISIT (OUTPATIENT)
Dept: FAMILY MEDICINE CLINIC | Facility: CLINIC | Age: 77
End: 2022-10-20
Payer: MEDICARE

## 2022-10-20 VITALS
HEART RATE: 76 BPM | OXYGEN SATURATION: 99 % | SYSTOLIC BLOOD PRESSURE: 126 MMHG | DIASTOLIC BLOOD PRESSURE: 78 MMHG | WEIGHT: 194 LBS | BODY MASS INDEX: 35.7 KG/M2 | HEIGHT: 62 IN

## 2022-10-20 DIAGNOSIS — E66.09 CLASS 2 OBESITY DUE TO EXCESS CALORIES WITHOUT SERIOUS COMORBIDITY WITH BODY MASS INDEX (BMI) OF 35.0 TO 35.9 IN ADULT: ICD-10-CM

## 2022-10-20 DIAGNOSIS — R73.03 PRE-DIABETES: ICD-10-CM

## 2022-10-20 DIAGNOSIS — G89.29 CHRONIC LEFT-SIDED LOW BACK PAIN WITH LEFT-SIDED SCIATICA: ICD-10-CM

## 2022-10-20 DIAGNOSIS — Z23 ENCOUNTER FOR IMMUNIZATION: Primary | ICD-10-CM

## 2022-10-20 DIAGNOSIS — I10 PRIMARY HYPERTENSION: ICD-10-CM

## 2022-10-20 DIAGNOSIS — M54.42 CHRONIC LEFT-SIDED LOW BACK PAIN WITH LEFT-SIDED SCIATICA: ICD-10-CM

## 2022-10-20 DIAGNOSIS — E78.5 DYSLIPIDEMIA: ICD-10-CM

## 2022-10-20 PROBLEM — E66.812 CLASS 2 OBESITY DUE TO EXCESS CALORIES IN ADULT: Status: ACTIVE | Noted: 2022-10-20

## 2022-10-20 PROBLEM — M54.40 CHRONIC LEFT-SIDED LOW BACK PAIN WITH SCIATICA: Status: ACTIVE | Noted: 2022-10-20

## 2022-10-20 PROCEDURE — 99214 OFFICE O/P EST MOD 30 MIN: CPT | Performed by: INTERNAL MEDICINE

## 2022-10-20 PROCEDURE — G0008 ADMIN INFLUENZA VIRUS VAC: HCPCS | Performed by: INTERNAL MEDICINE

## 2022-10-20 PROCEDURE — 90662 IIV NO PRSV INCREASED AG IM: CPT | Performed by: INTERNAL MEDICINE

## 2022-10-20 RX ORDER — CRANBERRY FRUIT EXTRACT 200 MG
CAPSULE ORAL DAILY
COMMUNITY

## 2022-10-20 RX ORDER — MELOXICAM 15 MG/1
15 TABLET ORAL DAILY PRN
Qty: 30 TABLET | Refills: 1 | Status: SHIPPED | OUTPATIENT
Start: 2022-10-20

## 2022-10-20 NOTE — ASSESSMENT & PLAN NOTE
Patient's last BMI is 35 48    Recommend again watch the calorie intake diet exercise and lose weight

## 2022-10-20 NOTE — ASSESSMENT & PLAN NOTE
Patient with low back pain radiating down the left lower extremity will start her on meloxicam 15 mg daily with food p r n  along with baclofen 5 mg 3 times a day as needed  Recommend also to use a heating pad

## 2022-10-20 NOTE — PROGRESS NOTES
Office Visit Note  10/20/22     Renu Cristina 69 y o  female MRN: [id number]  : 1945    Assessment:     1  Encounter for immunization  -     influenza vaccine, high-dose, PF 0 5 mL    2  Chronic left-sided low back pain with left-sided sciatica  Assessment & Plan:  Patient with low back pain radiating down the left lower extremity will start her on meloxicam 15 mg daily with food p r n  along with baclofen 5 mg 3 times a day as needed  Recommend also to use a heating pad  Orders:  -     meloxicam (Mobic) 15 mg tablet; Take 1 tablet (15 mg total) by mouth daily as needed for moderate pain    3  Class 2 obesity due to excess calories without serious comorbidity with body mass index (BMI) of 35 0 to 35 9 in adult  Assessment & Plan:  Patient's last BMI is 35 48  Recommend again watch the calorie intake diet exercise and lose weight      4  Primary hypertension  Assessment & Plan:  Patient's blood pressure is stable at 120 / with current medication telmisartan 80 mg daily will continue with the same  Orders:  -     Comprehensive metabolic panel; Future    5  Dyslipidemia  -     Lipid panel; Future    6  Pre-diabetes  -     Hemoglobin A1C; Future; Expected date: 2022        Discussion Summary and Plan: Today's care plan and medications were reviewed with patient in detail and all their questions answered to their satisfaction  Chief Complaint   Patient presents with   • Follow-up   • Flu Vaccine      Subjective:  Patient complains of pain in the left side in the back radiating down the left lower extremity without any tingling numbness feeling  Pain at times is severe in the left lower extremity especially when she is standing for a long period of time and also walking  No shortness of breath chest pain palpitations or any other associated symptom        The following portions of the patient's history were reviewed and updated as appropriate: allergies, current medications, past family history, past medical history, past social history, past surgical history and problem list     Review of Systems   Constitutional: Negative for chills and fever  HENT: Negative for ear pain and sore throat  Eyes: Negative for pain and visual disturbance  Respiratory: Negative for cough and shortness of breath  Cardiovascular: Negative for chest pain and palpitations  Gastrointestinal: Negative for abdominal pain and vomiting  Genitourinary: Negative for dysuria and hematuria  Musculoskeletal: Positive for arthralgias and back pain  Skin: Negative for color change and rash  Neurological: Negative for seizures and syncope  All other systems reviewed and are negative          Historical Information   Patient Active Problem List   Diagnosis   • Peripheral arteriosclerosis ([facility] [address] )   • Pain in both feet   • Onychomycosis   • Chronic left-sided low back pain with sciatica   • Primary hypertension   • Class 2 obesity due to excess calories in adult     Past Medical History:   Diagnosis Date   • Anxiety disorder    • Arthritis    • Bright red rectal bleeding    • Colon polyp    • DJD (degenerative joint disease)    • High cholesterol    • Hyperactivity of bladder    • Hypertension    • Macular degeneration    • Obesity    • Osteoporosis      Past Surgical History:   Procedure Laterality Date   • CHOLECYSTECTOMY     • COLONOSCOPY     • DXA PROCEDURE (HISTORICAL)  06/23/2021   • INTRAOCULAR LENS INSERTION     • MAMMO (HISTORICAL)  01/04/2022   • TOTAL SHOULDER REPLACEMENT Left      Social History     Substance and Sexual Activity   Alcohol Use No     Social History     Substance and Sexual Activity   Drug Use No     Social History     Tobacco Use   Smoking Status Former Smoker   • Types: Cigarettes   Smokeless Tobacco Never Used   Tobacco Comment    quit cigarettes age 33     Family History   Problem Relation Age of Onset   • No Known Problems Mother    • Esophageal cancer Father    • No Known Problems Sister    • No Known Problems Sister    • Esophageal cancer Maternal Grandmother    • No Known Problems Paternal Grandmother    • Breast cancer Maternal Aunt 79   • No Known Problems Maternal Aunt    • No Known Problems Maternal Aunt    • No Known Problems Maternal Aunt    • Skin cancer Paternal Aunt    • No Known Problems Paternal Aunt    • No Known Problems Paternal Aunt    • Cancer Paternal Aunt    • Breast cancer Cousin 68     Health Maintenance Due   Topic   • Hepatitis C Screening    • Medicare Annual Wellness Visit (AWV)    • BMI: Followup Plan    • Urinary Incontinence Screening    • Pneumococcal Vaccine: 65+ Years (1 - PCV)   • Fall Risk    • Depression Screening    • COVID-19 Vaccine (3 - Booster for [employer] series)      Meds/Allergies       Current Outpatient Medications:   •  Calcium Carb-Cholecalciferol 1000-800 MG-UNIT TABS, Calcium TABS  Refills: 0  Active, Disp: , Rfl:   •  cholecalciferol (VITAMIN D3) 1,000 units tablet, Vitamin D TABS  Refills: 0  Active, Disp: , Rfl:   •  Cinnamon 500 MG capsule, Cinnamon CAPS  Refills: 0  Active, Disp: , Rfl:   •  Cranberry 200 MG CAPS, Take by mouth in the morning, Disp: , Rfl:   •  Flaxseed, Linseed, (FLAX SEED OIL) 1000 MG CAPS, Flax Seed Oil 1000 MG Oral Capsule  Refills: 0  Active, Disp: , Rfl:   •  meloxicam (Mobic) 15 mg tablet, Take 1 tablet (15 mg total) by mouth daily as needed for moderate pain, Disp: 30 tablet, Rfl: 1  •  Multiple Vitamins-Minerals (PRESERVISION AREDS 2 PO), Take by mouth, Disp: , Rfl:   •  Omega-3 Fatty Acids (FISH OIL) 645 MG CAPS, Fish Oil CAPS  Refills: 0  Active, Disp: , Rfl:   •  Potassium 95 MG TABS, Potassium TABS  Refills: 0  Active, Disp: , Rfl:   •  telmisartan (MICARDIS) 80 MG tablet, Micardis 80 MG Oral Tablet  Refills: 0  Active, Disp: , Rfl:   •  hydrocortisone-pramoxine (ANALPRAM-HC) 2 5-1 % rectal cream, Apply topically 3 (three) times a day, Disp: 4 g, Rfl: 0      Objective:    Vitals:   /78 (BP Location: Right arm, Patient Position: Sitting, Cuff Size: Large)   Pulse 76   Ht 5' 2" (1 575 m)   Wt 88 kg (194 lb)   SpO2 99%   BMI 35 48 kg/m²   Body mass index is 35 48 kg/m²  Vitals:    10/20/22 1027   Weight: 88 kg (194 lb)       Physical Exam  Vitals and nursing note reviewed  Constitutional:       Appearance: Normal appearance  She is obese  Cardiovascular:      Rate and Rhythm: Normal rate and regular rhythm  Heart sounds: Normal heart sounds  Pulmonary:      Effort: Pulmonary effort is normal       Breath sounds: Normal breath sounds  Abdominal:      General: Abdomen is flat  Palpations: Abdomen is soft  Musculoskeletal:      Cervical back: Normal range of motion and neck supple  Right lower leg: No edema  Left lower leg: No edema  Comments: SLR about 40°   Neurological:      General: No focal deficit present  Mental Status: She is alert and oriented to person, place, and time  Lab Review   No visits with results within 2 Month(s) from this visit  Latest known visit with results is:   Hospital Outpatient Visit on 06/02/2022   Component Date Value Ref Range Status   • Case Report 06/02/2022    Final                    Value:Surgical Pathology Report                         Case: [id number]                                   Authorizing Provider:  Nubia Tafoya MD         Collected:           06/02/2022 7627              Ordering Location:     [address] [place] [address] Received:            06/02/2022 On license of [facility]                                                                  Pathologist:           Chavo Davis MD                                                       Specimens:   A) - Large Intestine, Cecum, CECUM   cold bx   polyp x1                                              B) - Large Intestine, Right/Ascending Colon, ASCENDING   cold bx   polyp x1                         C) - Large Intestine, Transverse Colon, TRANSVERSE   cold snare(2), cold                            bx(2)   polyps x4                                                                                   D) - Large Intestine, Left/Descending Colon, DESCENDING   cold snare(1),cold                                                  bx(1)   polyps x2                                                                                   E) - Large Intestine, Sigmoid Colon, SIGMOID   cold bx(1), cold snare(1)   polyps x2                F) - Rectum, RECTUM   cold bx(3), cold snare(1)  [place] Summer [place] polyps x4                              • Final Diagnosis 06/02/2022    Final                    Value: This result contains rich text formatting which cannot be displayed here  • Note 06/02/2022    Final                    Value: This result contains rich text formatting which cannot be displayed here  • Additional Information 06/02/2022    Final                    Value: This result contains rich text formatting which cannot be displayed here  • Synoptic Checklist 06/02/2022    Final                    Value:                            COLON/RECTUM POLYP FORM - GI - B, C, D, E, F                                                                                     :    Adenoma(s)     • Gross Description 06/02/2022    Final                    Value: This result contains rich text formatting which cannot be displayed here  Alfreda Tafoya        "This note has been constructed using a voice recognition system  Therefore there may be syntax, spelling, and/or grammatical errors   Please call if you have any questions  "

## 2022-10-20 NOTE — ASSESSMENT & PLAN NOTE
Patient's blood pressure is stable at 120 6/78 with current medication telmisartan 80 mg daily will continue with the same

## 2022-11-06 DIAGNOSIS — I10 PRIMARY HYPERTENSION: Primary | ICD-10-CM

## 2022-11-06 RX ORDER — TELMISARTAN 80 MG/1
TABLET ORAL
Qty: 90 TABLET | Refills: 1 | Status: SHIPPED | OUTPATIENT
Start: 2022-11-06

## 2022-11-13 ENCOUNTER — RA CDI HCC (OUTPATIENT)
Dept: OTHER | Facility: HOSPITAL | Age: 77
End: 2022-11-13

## 2022-11-18 LAB
ALBUMIN SERPL-MCNC: 3.8 G/DL (ref 3.7–4.7)
ALBUMIN/GLOB SERPL: 1.5 {RATIO} (ref 1.2–2.2)
ALP SERPL-CCNC: 105 IU/L (ref 44–121)
ALT SERPL-CCNC: 12 IU/L (ref 0–32)
AST SERPL-CCNC: 11 IU/L (ref 0–40)
BILIRUB SERPL-MCNC: 0.5 MG/DL (ref 0–1.2)
BUN SERPL-MCNC: 12 MG/DL (ref 8–27)
BUN/CREAT SERPL: 15 (ref 12–28)
CALCIUM SERPL-MCNC: 9.5 MG/DL (ref 8.7–10.3)
CHLORIDE SERPL-SCNC: 101 MMOL/L (ref 96–106)
CHOLEST SERPL-MCNC: 205 MG/DL (ref 100–199)
CO2 SERPL-SCNC: 24 MMOL/L (ref 20–29)
CREAT SERPL-MCNC: 0.8 MG/DL (ref 0.57–1)
EGFR: 76 ML/MIN/1.73
GLOBULIN SER-MCNC: 2.5 G/DL (ref 1.5–4.5)
GLUCOSE SERPL-MCNC: 95 MG/DL (ref 70–99)
HBA1C MFR BLD: 5.6 % (ref 4.8–5.6)
HDLC SERPL-MCNC: 67 MG/DL
LDLC SERPL CALC-MCNC: 124 MG/DL (ref 0–99)
POTASSIUM SERPL-SCNC: 4.5 MMOL/L (ref 3.5–5.2)
PROT SERPL-MCNC: 6.3 G/DL (ref 6–8.5)
SL AMB VLDL CHOLESTEROL CALC: 14 MG/DL (ref 5–40)
SODIUM SERPL-SCNC: 139 MMOL/L (ref 134–144)
TRIGL SERPL-MCNC: 77 MG/DL (ref 0–149)

## 2022-11-21 ENCOUNTER — OFFICE VISIT (OUTPATIENT)
Dept: FAMILY MEDICINE CLINIC | Facility: CLINIC | Age: 77
End: 2022-11-21

## 2022-11-21 VITALS
SYSTOLIC BLOOD PRESSURE: 130 MMHG | HEART RATE: 72 BPM | OXYGEN SATURATION: 100 % | HEIGHT: 63 IN | BODY MASS INDEX: 34.73 KG/M2 | DIASTOLIC BLOOD PRESSURE: 78 MMHG | WEIGHT: 196 LBS

## 2022-11-21 DIAGNOSIS — E78.5 DYSLIPIDEMIA: ICD-10-CM

## 2022-11-21 DIAGNOSIS — E66.09 CLASS 1 OBESITY DUE TO EXCESS CALORIES WITH SERIOUS COMORBIDITY AND BODY MASS INDEX (BMI) OF 34.0 TO 34.9 IN ADULT: ICD-10-CM

## 2022-11-21 DIAGNOSIS — I10 PRIMARY HYPERTENSION: Primary | ICD-10-CM

## 2022-11-21 DIAGNOSIS — M15.9 PRIMARY OSTEOARTHRITIS INVOLVING MULTIPLE JOINTS: ICD-10-CM

## 2022-11-21 PROBLEM — E66.811 CLASS 1 OBESITY DUE TO EXCESS CALORIES IN ADULT: Status: ACTIVE | Noted: 2022-11-21

## 2022-11-21 PROBLEM — E66.812 CLASS 2 OBESITY DUE TO EXCESS CALORIES IN ADULT: Status: RESOLVED | Noted: 2022-10-20 | Resolved: 2022-11-21

## 2022-11-21 PROBLEM — M15.0 PRIMARY OSTEOARTHRITIS INVOLVING MULTIPLE JOINTS: Status: ACTIVE | Noted: 2022-11-21

## 2022-11-21 NOTE — ASSESSMENT & PLAN NOTE
Patient not able to take the statin medications currently are cholesterol is 205 came down from the previous lab work had her LDL came down will continue with diet for now which I discussed with the patient

## 2022-11-21 NOTE — PROGRESS NOTES
13 Office Visit Note  22     Benjamin Ortiz 69 y o  female MRN: [id number]  : 1945    Assessment:     1  Primary hypertension  Assessment & Plan:  Patient blood pressure is stable at  currently taking telmisartan 80 mg daily will continue with the same dosage  Patient's chemistry came back normal including BUN 12 creatinine 0 80 liver function tests normal   A1c is 5 6      2  Dyslipidemia  Assessment & Plan:  Patient not able to take the statin medications currently are cholesterol is 205 came down from the previous lab work had her LDL came down will continue with diet for now which I discussed with the patient  3  Class 1 obesity due to excess calories with serious comorbidity and body mass index (BMI) of 34 0 to 34 9 in adult  Assessment & Plan:  Patient's BMI is 34 72 again emphasized regarding diet exercise losing weight  4  Primary osteoarthritis involving multiple joints  Assessment & Plan:  Patient with osteoarthritis the currently taking meloxicam only on a p r n  basis will continue with the same for now  Discussion Summary and Plan: Today's care plan and medications were reviewed with patient in detail and all their questions answered to their satisfaction  Chief Complaint   Patient presents with   • Follow-up      Subjective:  Patient has come in for evaluation regarding hypertension  She recently had lab work done which has revealed mild elevation in cholesterol at 205 with an LDL of 124 an HDL of 67 these numbers are slightly better compared to before her A1c is at 5 6 normal   Medications reviewed  No chest pains palpitation shortness of breath  The following portions of the patient's history were reviewed and updated as appropriate: allergies, current medications, past family history, past medical history, past social history, past surgical history and problem list     Review of Systems   Constitutional: Negative for chills and fever     HENT: Negative for ear pain and sore throat  Eyes: Negative for pain and visual disturbance  Respiratory: Negative for cough and shortness of breath  Cardiovascular: Negative for chest pain and palpitations  Gastrointestinal: Negative for abdominal pain and vomiting  Genitourinary: Negative for dysuria and hematuria  Musculoskeletal: Positive for arthralgias  Negative for back pain  Skin: Negative for color change and rash  Neurological: Negative for seizures and syncope  All other systems reviewed and are negative          Historical Information   Patient Active Problem List   Diagnosis   • Peripheral arteriosclerosis ([facility] [address] )   • Pain in both feet   • Onychomycosis   • Chronic left-sided low back pain with sciatica   • Primary hypertension   • Class 1 obesity due to excess calories in adult   • Dyslipidemia   • Primary osteoarthritis involving multiple joints     Past Medical History:   Diagnosis Date   • Anxiety disorder    • Arthritis    • Bright red rectal bleeding    • Colon polyp    • DJD (degenerative joint disease)    • High cholesterol    • Hyperactivity of bladder    • Hypertension    • Macular degeneration    • Obesity    • Osteoporosis      Past Surgical History:   Procedure Laterality Date   • CHOLECYSTECTOMY     • COLONOSCOPY     • DXA PROCEDURE (HISTORICAL)  06/23/2021   • INTRAOCULAR LENS INSERTION     • MAMMO (HISTORICAL)  01/04/2022   • TOTAL SHOULDER REPLACEMENT Left      Social History     Substance and Sexual Activity   Alcohol Use No     Social History     Substance and Sexual Activity   Drug Use No     Social History     Tobacco Use   Smoking Status Former   • Types: Cigarettes   Smokeless Tobacco Never   Tobacco Comments    quit cigarettes age 33     Family History   Problem Relation Age of Onset   • No Known Problems Mother    • Esophageal cancer Father    • No Known Problems Sister    • No Known Problems Sister    • Esophageal cancer Maternal Grandmother    • No Known Problems Paternal Grandmother    • Breast cancer Maternal Aunt 79   • No Known Problems Maternal Aunt    • No Known Problems Maternal Aunt    • No Known Problems Maternal Aunt    • Skin cancer Paternal Aunt    • No Known Problems Paternal Aunt    • No Known Problems Paternal Aunt    • Cancer Paternal Aunt    • Breast cancer Cousin 68     Health Maintenance Due   Topic   • Hepatitis C Screening    • Medicare Annual Wellness Visit (AWV)    • Hepatitis B Vaccine (1 of 3 - 3-dose series)   • BMI: Followup Plan    • Urinary Incontinence Screening    • Pneumococcal Vaccine: 65+ Years (1 - PCV)   • Fall Risk    • Depression Screening    • COVID-19 Vaccine (3 - Booster for Singh Peter series)      Meds/Allergies       Current Outpatient Medications:   •  Calcium Carb-Cholecalciferol 1000-800 MG-UNIT TABS, Calcium TABS  Refills: 0  Active, Disp: , Rfl:   •  cholecalciferol (VITAMIN D3) 1,000 units tablet, Vitamin D TABS  Refills: 0  Active, Disp: , Rfl:   •  Cinnamon 500 MG capsule, Cinnamon CAPS  Refills: 0  Active, Disp: , Rfl:   •  Cranberry 200 MG CAPS, Take by mouth in the morning, Disp: , Rfl:   •  Flaxseed, Linseed, (FLAX SEED OIL) 1000 MG CAPS, Flax Seed Oil 1000 MG Oral Capsule  Refills: 0  Active, Disp: , Rfl:   •  meloxicam (Mobic) 15 mg tablet, Take 1 tablet (15 mg total) by mouth daily as needed for moderate pain, Disp: 30 tablet, Rfl: 1  •  Multiple Vitamins-Minerals (PRESERVISION AREDS 2 PO), Take by mouth, Disp: , Rfl:   •  Omega-3 Fatty Acids (FISH OIL) 645 MG CAPS, Fish Oil CAPS  Refills: 0  Active, Disp: , Rfl:   •  Potassium 95 MG TABS, Potassium TABS  Refills: 0  Active, Disp: , Rfl:   •  telmisartan (MICARDIS) 80 MG tablet, TAKE ONE TABLET ONCE DAILY BY MOUTH, Disp: 90 tablet, Rfl: 1  •  hydrocortisone-pramoxine (ANALPRAM-HC) 2 5-1 % rectal cream, Apply topically 3 (three) times a day, Disp: 4 g, Rfl: 0      Objective:    Vitals:   /78 (BP Location: Right arm, Patient Position: Sitting, Cuff Size: Large) Pulse 72   Ht 5' 3" (1 6 m)   Wt 88 9 kg (196 lb)   SpO2 100%   BMI 34 72 kg/m²   Body mass index is 34 72 kg/m²  Vitals:    11/21/22 1456   Weight: 88 9 kg (196 lb)       Physical Exam  Vitals and nursing note reviewed  Constitutional:       Appearance: Normal appearance  She is obese  Cardiovascular:      Rate and Rhythm: Normal rate and regular rhythm  Heart sounds: Normal heart sounds  Pulmonary:      Effort: Pulmonary effort is normal       Breath sounds: Normal breath sounds  Abdominal:      General: Abdomen is flat  Palpations: Abdomen is soft  Musculoskeletal:      Cervical back: Normal range of motion and neck supple  Right lower leg: No edema  Left lower leg: No edema  Neurological:      Mental Status: She is alert and oriented to person, place, and time           Lab Review   Orders Only on 11/17/2022   Component Date Value Ref Range Status   • Glucose, Random 11/17/2022 95  70 - 99 mg/dL Final   • BUN 11/17/2022 12  8 - 27 mg/dL Final   • Creatinine 11/17/2022 0 80  0 57 - 1 00 mg/dL Final   • eGFR 11/17/2022 76  >59 mL/min/1 73 Final   • SL AMB BUN/CREATININE RATIO 11/17/2022 15  12 - 28 Final   • Sodium 11/17/2022 139  134 - 144 mmol/L Final   • Potassium 11/17/2022 4 5  3 5 - 5 2 mmol/L Final   • Chloride 11/17/2022 101  96 - 106 mmol/L Final   • CO2 11/17/2022 24  20 - 29 mmol/L Final   • CALCIUM 11/17/2022 9 5  8 7 - 10 3 mg/dL Final   • Protein, Total 11/17/2022 6 3  6 0 - 8 5 g/dL Final   • Albumin 11/17/2022 3 8  3 7 - 4 7 g/dL Final   • Globulin, Total 11/17/2022 2 5  1 5 - 4 5 g/dL Final   • Albumin/Globulin Ratio 11/17/2022 1 5  1 2 - 2 2 Final   • TOTAL BILIRUBIN 11/17/2022 0 5  0 0 - 1 2 mg/dL Final   • Alk Phos Isoenzymes 11/17/2022 105  44 - 121 IU/L Final   • AST 11/17/2022 11  0 - 40 IU/L Final   • ALT 11/17/2022 12  0 - 32 IU/L Final   • Cholesterol, Total 11/17/2022 205 (H)  100 - 199 mg/dL Final   • Triglycerides 11/17/2022 77  0 - 149 mg/dL Final • HDL 11/17/2022 67  >39 mg/dL Final   • VLDL Cholesterol Calculated 11/17/2022 14  5 - 40 mg/dL Final   • LDL Calculated 11/17/2022 124 (H)  0 - 99 mg/dL Final   • Hemoglobin A1C 11/17/2022 5 6  4 8 - 5 6 % Final    Comment:          Prediabetes: 5 7 - 6 4           Diabetes: >6 4           Glycemic control for adults with diabetes: <7 0           Sharda Bermudez MD        "This note has been constructed using a voice recognition system  Therefore there may be syntax, spelling, and/or grammatical errors   Please call if you have any questions  "

## 2022-11-21 NOTE — ASSESSMENT & PLAN NOTE
Patient blood pressure is stable at 1 30/78 currently taking telmisartan 80 mg daily will continue with the same dosage    Patient's chemistry came back normal including BUN 12 creatinine 0 80 liver function tests normal   A1c is 5 6

## 2022-11-21 NOTE — ASSESSMENT & PLAN NOTE
Patient with osteoarthritis the currently taking meloxicam only on a p r n  basis will continue with the same for now

## 2022-11-30 ENCOUNTER — OFFICE VISIT (OUTPATIENT)
Dept: PODIATRY | Facility: CLINIC | Age: 77
End: 2022-11-30

## 2022-11-30 VITALS
WEIGHT: 196 LBS | RESPIRATION RATE: 17 BRPM | HEIGHT: 63 IN | DIASTOLIC BLOOD PRESSURE: 78 MMHG | BODY MASS INDEX: 34.73 KG/M2 | SYSTOLIC BLOOD PRESSURE: 130 MMHG

## 2022-11-30 DIAGNOSIS — M79.672 PAIN IN BOTH FEET: ICD-10-CM

## 2022-11-30 DIAGNOSIS — M79.671 PAIN IN BOTH FEET: ICD-10-CM

## 2022-11-30 DIAGNOSIS — L92.3 FOREIGN BODY GRANULOMA OF SKIN: Primary | ICD-10-CM

## 2022-11-30 DIAGNOSIS — B35.1 ONYCHOMYCOSIS: ICD-10-CM

## 2022-11-30 DIAGNOSIS — I70.209 PERIPHERAL ARTERIOSCLEROSIS (HCC): ICD-10-CM

## 2022-11-30 NOTE — PROGRESS NOTES
Assessment/Plan:  Pain   Mycosis of nail   Paronychia   Peripheral artery disease  Foreign body granuloma right heel      Plan   Foot exam performed   Patient educated on condition   All mycotic nails debrided without pain or complication  Patient advised on treatment foreign body  Today lesion debrided  Foreign body excised  Gentian violet applied    Of note patient tetanus status up to date     Subjective:   Patient complains of pain in her feet with ambulation   No history of trauma              Past Medical History:   Diagnosis Date   • Hypertension     • Macular degeneration                     Past Surgical History:   Procedure Laterality Date   • INTRAOCULAR LENS INSERTION                       Allergies   Allergen Reactions   • Atorvastatin Other (See Comments)       legs get stiff   • Zithromax [Azithromycin]     • Erythromycin Base Palpitations            Current Outpatient Prescriptions:   •  Calcium Carb-Cholecalciferol (CALCIUM 1000 + D) 1000-800 MG-UNIT TABS, Calcium TABS  Refills: 0  Active, Disp: , Rfl:   •  cholecalciferol (VITAMIN D3) 1,000 units tablet, Vitamin D TABS  Refills: 0  Active, Disp: , Rfl:   •  Cinnamon 500 MG capsule, Cinnamon CAPS  Refills: 0  Active, Disp: , Rfl:   •  cycloSPORINE (RESTASIS) 0 05 % ophthalmic emulsion, Restasis 0 05 % Ophthalmic Emulsion  Refills: 0  Active, Disp: , Rfl:   •  Flaxseed, Linseed, (FLAX SEED OIL) 1000 MG CAPS, Flax Seed Oil 1000 MG Oral Capsule  Refills: 0  Active, Disp: , Rfl:   •  fluticasone (FLONASE) 50 mcg/act nasal spray, Fluticasone Propionate 50 MCG/ACT Nasal Suspension  Quantity: 16;  Refills: 0   Started 29-Nov-2014 Active, Disp: , Rfl:   •  Magnesium 100 MG CAPS, Magnesium TABS  Refills: 0  Active, Disp: , Rfl:   •  Omega-3 Fatty Acids (FISH OIL) 645 MG CAPS, Fish Oil CAPS  Refills: 0  Active, Disp: , Rfl:   •  Potassium 95 MG TABS, Potassium TABS  Refills: 0  Active, Disp: , Rfl:   •  telmisartan (MICARDIS) 80 MG tablet, Micardis 80 MG Oral Tablet  Refills: 0  Active, Disp: , Rfl:      There is no problem list on file for this patient             Patient ID: Neisha Salazar is a 76 y  o  female      HPI     The following portions of the patient's history were reviewed and updated as appropriate: allergies, current medications, past family history, past medical history, past social history, past surgical history and problem list      Review of Systems       Objective:  Patient's shoes and socks removed    Foot ExamPhysical Exam             Physical Exam  Left Foot: Appearance: Normal except as noted: excessive [id number] cavus  Tenderness: None except the great toe,Qa1forktt longitudinal zwpjWPe0rarsbjsea of the plantar fascia  Right Foot: Appearance: Normal except as noted: excessive wxvwcteplvBEc6dvb cavus  Tenderness: None except the medial longitudinal [id number] of the plantar fascia  Left Ankle: ROM: limited ROM in all planes   Right Ankle: ROM: limited ROM in all planes   Neurological Exam: performed  Light touch was intact bilaterally  Vibratory sensation was intact bilaterally  Response to monofilament test was intact bilaterally  Deep tendon reflexes: patellar reflex present [id number] reflex present bilaterally  Vascular Exam: performed Dorsalis pedis pulses were diminished bilaterally  Posterior tibial pulses were diminished bilaterally  Elevation Pallor: present bilaterally  Capillary refill time was greater than 3 seconds [id number] 8 findings bilateral negative digital hair noted all mycotic nails debrided today  Edema: mild bilaterally   These are Q, 9 findings bilateral  Toenails: All of the toenails were elongated,My1pxwsyullusllz,Hk8yphdhcligj,Dh9ulwvoud,Ii0izisklVYn2Pkeznow  Hyperkeratosis: present on both first sub metatarsals     Shoe Gear Evaluation: Recommendation(s): custom [id number] style        Posterior plantar aspect right heel demonstrates bullae  Area debrided  Foreign body found    This is consistent with wood

## 2022-12-27 ENCOUNTER — RA CDI HCC (OUTPATIENT)
Dept: OTHER | Facility: HOSPITAL | Age: 77
End: 2022-12-27

## 2023-01-04 ENCOUNTER — TELEPHONE (OUTPATIENT)
Dept: FAMILY MEDICINE CLINIC | Facility: CLINIC | Age: 78
End: 2023-01-04

## 2023-01-04 ENCOUNTER — OFFICE VISIT (OUTPATIENT)
Dept: FAMILY MEDICINE CLINIC | Facility: CLINIC | Age: 78
End: 2023-01-04

## 2023-01-04 VITALS
HEART RATE: 68 BPM | SYSTOLIC BLOOD PRESSURE: 128 MMHG | WEIGHT: 191 LBS | DIASTOLIC BLOOD PRESSURE: 78 MMHG | HEIGHT: 62 IN | BODY MASS INDEX: 35.15 KG/M2 | OXYGEN SATURATION: 100 %

## 2023-01-04 DIAGNOSIS — Z12.31 ENCOUNTER FOR SCREENING MAMMOGRAM FOR MALIGNANT NEOPLASM OF BREAST: Primary | ICD-10-CM

## 2023-01-04 DIAGNOSIS — E66.09 CLASS 1 OBESITY DUE TO EXCESS CALORIES WITH SERIOUS COMORBIDITY AND BODY MASS INDEX (BMI) OF 34.0 TO 34.9 IN ADULT: ICD-10-CM

## 2023-01-04 DIAGNOSIS — N39.0 URINARY TRACT INFECTION WITHOUT HEMATURIA, SITE UNSPECIFIED: Primary | ICD-10-CM

## 2023-01-04 DIAGNOSIS — M15.9 PRIMARY OSTEOARTHRITIS INVOLVING MULTIPLE JOINTS: ICD-10-CM

## 2023-01-04 DIAGNOSIS — I70.209 PERIPHERAL ARTERIOSCLEROSIS (HCC): ICD-10-CM

## 2023-01-04 DIAGNOSIS — I10 PRIMARY HYPERTENSION: ICD-10-CM

## 2023-01-04 DIAGNOSIS — R35.0 URINATION FREQUENCY: ICD-10-CM

## 2023-01-04 NOTE — ASSESSMENT & PLAN NOTE
Patient with increased frequency of urination we will get a urine analysis and culture done we will also refer the patient to the urogynecologist for further evaluation and treatment

## 2023-01-04 NOTE — ASSESSMENT & PLAN NOTE
Patient's blood pressure is stable at 128/78 we will continue the current dosage of the telmisartan 80 mg daily

## 2023-01-04 NOTE — ASSESSMENT & PLAN NOTE
Patient with cramps in the left lower extremity exam shows poor peripheral pulses we will get a Doppler arterial study of the lower extremities

## 2023-01-04 NOTE — PROGRESS NOTES
Office Visit Note  23     James Coelho 69 y o  female MRN: [id number]  : 1945    Assessment:     1  Class 1 obesity due to excess calories with serious comorbidity and body mass index (BMI) of 34 0 to 34 9 in adult  Assessment & Plan:  Patient's current BMI is 34 93 again emphasized regarding diet exercise cutting back calorie intake carbohydrate intake lifestyle modification  2  Peripheral arteriosclerosis ([address] )  Assessment & Plan:  Patient with cramps in the left lower extremity exam shows poor peripheral pulses we will get a Doppler arterial study of the lower extremities      3  Primary hypertension  Assessment & Plan:  Patient's blood pressure is stable at 128/78 we will continue the current dosage of the telmisartan 80 mg daily  4  Primary osteoarthritis involving multiple joints    5  Urinary tract infection without hematuria, site unspecified  Assessment & Plan:  Patient with increased frequency of urination we will get a urine analysis and culture done we will also refer the patient to the urogynecologist for further evaluation and treatment  Discussion Summary and Plan: Today's care plan and medications were reviewed with patient in detail and all their questions answered to their satisfaction  Chief Complaint   Patient presents with   • Follow-up      Subjective:  Patient has come in here for evaluation regarding getting up frequently in the nighttime to pass urine  Burning sensation when passing urine no pain in the lower and in the flank area  Patient also complains of cramping in the left lower extremity while ambulating  No injury  Medications reviewed labs reviewed  No chest pains palpitation shortness of breath        The following portions of the patient's history were reviewed and updated as appropriate: allergies, current medications, past family history, past medical history, past social history, past surgical history and problem list     Review of Systems   Constitutional: Negative for chills and fever  HENT: Negative for ear pain and sore throat  Eyes: Negative for pain and visual disturbance  Respiratory: Negative for cough and shortness of breath  Cardiovascular: Negative for chest pain and palpitations  Gastrointestinal: Negative for abdominal pain and vomiting  Genitourinary: Positive for frequency  Negative for dysuria and hematuria  Musculoskeletal: Positive for arthralgias  Negative for back pain  Skin: Negative for color change and rash  Neurological: Negative for seizures and syncope  All other systems reviewed and are negative          Historical Information   Patient Active Problem List   Diagnosis   • Peripheral arteriosclerosis ([address] )   • Pain in both feet   • Primary hypertension   • Class 1 obesity due to excess calories in adult   • Dyslipidemia   • Primary osteoarthritis involving multiple joints   • Urinary tract infection without hematuria     Past Medical History:   Diagnosis Date   • Anxiety disorder    • Arthritis    • Bright red rectal bleeding    • Colon polyp    • DJD (degenerative joint disease)    • High cholesterol    • Hyperactivity of bladder    • Hypertension    • Macular degeneration    • Obesity    • Osteoporosis      Past Surgical History:   Procedure Laterality Date   • CHOLECYSTECTOMY     • COLONOSCOPY     • DXA PROCEDURE (HISTORICAL)  06/23/2021   • INTRAOCULAR LENS INSERTION     • MAMMO (HISTORICAL)  01/04/2022   • TOTAL SHOULDER REPLACEMENT Left      Social History     Substance and Sexual Activity   Alcohol Use No     Social History     Substance and Sexual Activity   Drug Use No     Social History     Tobacco Use   Smoking Status Former   • Types: Cigarettes   Smokeless Tobacco Never   Tobacco Comments    quit cigarettes age 33     Family History   Problem Relation Age of Onset   • No Known Problems Mother    • Esophageal cancer Father    • No Known Problems Sister    • No Known Problems Sister • Esophageal cancer Maternal Grandmother    • No Known Problems Paternal Grandmother    • Breast cancer Maternal Aunt 79   • No Known Problems Maternal Aunt    • No Known Problems Maternal Aunt    • No Known Problems Maternal Aunt    • Skin cancer Paternal Aunt    • No Known Problems Paternal Aunt    • No Known Problems Paternal Aunt    • Cancer Paternal Aunt    • Breast cancer Cousin 68     Health Maintenance Due   Topic   • Hepatitis C Screening    • Medicare Annual Wellness Visit (AWV)    • Hepatitis B Vaccine (1 of 3 - 3-dose series)   • BMI: Followup Plan    • Urinary Incontinence Screening    • Pneumococcal Vaccine: 65+ Years (1 - PCV)   • Fall Risk    • COVID-19 Vaccine (3 - Booster for Singh Peter series)      Meds/Allergies       Current Outpatient Medications:   •  Calcium Carb-Cholecalciferol 1000-800 MG-UNIT TABS, Calcium TABS  Refills: 0  Active, Disp: , Rfl:   •  cholecalciferol (VITAMIN D3) 1,000 units tablet, Vitamin D TABS  Refills: 0  Active, Disp: , Rfl:   •  Cinnamon 500 MG capsule, Cinnamon CAPS  Refills: 0  Active, Disp: , Rfl:   •  Cranberry 200 MG CAPS, Take by mouth in the morning, Disp: , Rfl:   •  Flaxseed, Linseed, (FLAX SEED OIL) 1000 MG CAPS, Flax Seed Oil 1000 MG Oral Capsule  Refills: 0  Active, Disp: , Rfl:   •  Multiple Vitamins-Minerals (PRESERVISION AREDS 2 PO), Take by mouth, Disp: , Rfl:   •  Omega-3 Fatty Acids (FISH OIL) 645 MG CAPS, Fish Oil CAPS  Refills: 0  Active, Disp: , Rfl:   •  Potassium 95 MG TABS, Potassium TABS  Refills: 0  Active, Disp: , Rfl:   •  telmisartan (MICARDIS) 80 MG tablet, TAKE ONE TABLET ONCE DAILY BY MOUTH, Disp: 90 tablet, Rfl: 1      Objective:    Vitals:   /78 (BP Location: Right arm, Patient Position: Sitting, Cuff Size: Standard)   Pulse 68   Ht 5' 2" (1 575 m)   Wt 86 6 kg (191 lb)   SpO2 100%   BMI 34 93 kg/m²   Body mass index is 34 93 kg/m²    Vitals:    01/04/23 1222   Weight: 86 6 kg (191 lb)       Physical Exam  Vitals and nursing note reviewed  Constitutional:       Appearance: Normal appearance  Cardiovascular:      Rate and Rhythm: Normal rate and regular rhythm  Heart sounds: Normal heart sounds  Pulmonary:      Effort: Pulmonary effort is normal       Breath sounds: Normal breath sounds  Abdominal:      Palpations: Abdomen is soft  Musculoskeletal:      Cervical back: Normal range of motion and neck supple  Right lower leg: No edema  Left lower leg: No edema  Comments: Peripheral pulses are poor   Neurological:      Mental Status: She is alert and oriented to person, place, and time           Lab Review   Orders Only on 11/17/2022   Component Date Value Ref Range Status   • Glucose, Random 11/17/2022 95  70 - 99 mg/dL Final   • BUN 11/17/2022 12  8 - 27 mg/dL Final   • Creatinine 11/17/2022 0 80  0 57 - 1 00 mg/dL Final   • eGFR 11/17/2022 76  >59 mL/min/1 73 Final   • SL AMB BUN/CREATININE RATIO 11/17/2022 15  12 - 28 Final   • Sodium 11/17/2022 139  134 - 144 mmol/L Final   • Potassium 11/17/2022 4 5  3 5 - 5 2 mmol/L Final   • Chloride 11/17/2022 101  96 - 106 mmol/L Final   • CO2 11/17/2022 24  20 - 29 mmol/L Final   • CALCIUM 11/17/2022 9 5  8 7 - 10 3 mg/dL Final   • Protein, Total 11/17/2022 6 3  6 0 - 8 5 g/dL Final   • Albumin 11/17/2022 3 8  3 7 - 4 7 g/dL Final   • Globulin, Total 11/17/2022 2 5  1 5 - 4 5 g/dL Final   • Albumin/Globulin Ratio 11/17/2022 1 5  1 2 - 2 2 Final   • TOTAL BILIRUBIN 11/17/2022 0 5  0 0 - 1 2 mg/dL Final   • Alk Phos Isoenzymes 11/17/2022 105  44 - 121 IU/L Final   • AST 11/17/2022 11  0 - 40 IU/L Final   • ALT 11/17/2022 12  0 - 32 IU/L Final   • Cholesterol, Total 11/17/2022 205 (H)  100 - 199 mg/dL Final   • Triglycerides 11/17/2022 77  0 - 149 mg/dL Final   • HDL 11/17/2022 67  >39 mg/dL Final   • VLDL Cholesterol Calculated 11/17/2022 14  5 - 40 mg/dL Final   • LDL Calculated 11/17/2022 124 (H)  0 - 99 mg/dL Final   • Hemoglobin A1C 11/17/2022 5 6  4 8 - 5 6 % Final Comment:          Prediabetes: 5 7 - 6 4           Diabetes: >6 4           Glycemic control for adults with diabetes: <7 0           Crissie Habermann, MD        "This note has been constructed using a voice recognition system  Therefore there may be syntax, spelling, and/or grammatical errors   Please call if you have any questions  "

## 2023-01-04 NOTE — ASSESSMENT & PLAN NOTE
Patient's current BMI is 34 93 again emphasized regarding diet exercise cutting back calorie intake carbohydrate intake lifestyle modification

## 2023-01-07 LAB
APPEARANCE UR: CLEAR
BACTERIA URNS QL MICRO: NORMAL
BILIRUB UR QL STRIP: NEGATIVE
CASTS URNS QL MICRO: NORMAL /LPF
COLOR UR: YELLOW
EPI CELLS #/AREA URNS HPF: NORMAL /HPF (ref 0–10)
GLUCOSE UR QL: NEGATIVE
HGB UR QL STRIP: NEGATIVE
KETONES UR QL STRIP: NEGATIVE
LEUKOCYTE ESTERASE UR QL STRIP: NEGATIVE
MICRO URNS: NORMAL
MICRO URNS: NORMAL
NITRITE UR QL STRIP: NEGATIVE
PH UR STRIP: 7.5 [PH] (ref 5–7.5)
PROT UR QL STRIP: NEGATIVE
RBC #/AREA URNS HPF: NORMAL /HPF (ref 0–2)
SL AMB URINALYSIS REFLEX: NORMAL
SP GR UR: 1.02 (ref 1–1.03)
UROBILINOGEN UR STRIP-ACNC: 0.2 MG/DL (ref 0.2–1)
WBC #/AREA URNS HPF: NORMAL /HPF (ref 0–5)

## 2023-01-10 ENCOUNTER — HOSPITAL ENCOUNTER (OUTPATIENT)
Dept: RADIOLOGY | Facility: HOSPITAL | Age: 78
Discharge: HOME | End: 2023-01-10
Attending: INTERNAL MEDICINE

## 2023-01-10 DIAGNOSIS — I70.209 PERIPHERAL ARTERIOSCLEROSIS (HCC): ICD-10-CM

## 2023-02-07 ENCOUNTER — OFFICE VISIT (OUTPATIENT)
Dept: FAMILY MEDICINE CLINIC | Facility: CLINIC | Age: 78
End: 2023-02-07

## 2023-02-07 VITALS — WEIGHT: 193 LBS | BODY MASS INDEX: 35.51 KG/M2 | OXYGEN SATURATION: 96 % | HEART RATE: 78 BPM | HEIGHT: 62 IN

## 2023-02-07 DIAGNOSIS — E66.09 CLASS 1 OBESITY DUE TO EXCESS CALORIES WITH SERIOUS COMORBIDITY AND BODY MASS INDEX (BMI) OF 34.0 TO 34.9 IN ADULT: ICD-10-CM

## 2023-02-07 DIAGNOSIS — R35.0 URINARY FREQUENCY: ICD-10-CM

## 2023-02-07 DIAGNOSIS — I10 PRIMARY HYPERTENSION: Primary | ICD-10-CM

## 2023-02-07 NOTE — PROGRESS NOTES
Office Visit Note  23     Sebas Conn 69 y o  female MRN: [id number]  : 1945    Assessment:     1  Urinary frequency  Assessment & Plan:  Last visit I recommended patient be seen by urogynecologist   According to the patient she tried to reach their office but no one is answering back  We will make another referral to a different urogynecologist     Orders:  -     Ambulatory Referral to Urogynecology; Future    2  Class 1 obesity due to excess calories with serious comorbidity and body mass index (BMI) of 34 0 to 34 9 in adult  Assessment & Plan:  Patient with a BMI of 35 30 again emphasized regarding diet exercise cut back on carbohydrate intake calorie intake lifestyle modification      3  Primary hypertension  Assessment & Plan:  Blood pressure today slightly high at 146/78 we will monitor meanwhile continue Micardis 80 mg daily  Discussion Summary and Plan: Today's care plan and medications were reviewed with patient in detail and all their questions answered to their satisfaction  Chief Complaint   Patient presents with   • Follow-up     F/U, GYN surgical MD did not return any calls, could not schedule  Issues with night time urinary frequency  Nadira Pineda        Subjective:  Patient is coming here for follow-up evaluation regarding urinary frequency for which we have referred in the past to the urogynecologist but they are not responded to her  Continues to experience symptoms of frequency especially in the nighttime  History of hypertension currently taking telmisartan 80 mg daily  The following portions of the patient's history were reviewed and updated as appropriate: allergies, current medications, past family history, past medical history, past social history, past surgical history and problem list     Review of Systems   Constitutional: Negative for chills and fever  HENT: Negative for ear pain and sore throat      Eyes: Negative for pain and visual disturbance  Respiratory: Negative for cough and shortness of breath  Cardiovascular: Negative for chest pain and palpitations  Gastrointestinal: Negative for abdominal pain and vomiting  Genitourinary: Positive for frequency  Negative for dysuria and hematuria  Musculoskeletal: Negative for arthralgias and back pain  Skin: Negative for color change and rash  Neurological: Negative for seizures and syncope  All other systems reviewed and are negative          Historical Information   Patient Active Problem List   Diagnosis   • Peripheral arteriosclerosis ([address] )   • Pain in both feet   • Primary hypertension   • Class 1 obesity due to excess calories in adult   • Dyslipidemia   • Primary osteoarthritis involving multiple joints   • Urinary tract infection without hematuria   • Urinary frequency     Past Medical History:   Diagnosis Date   • Anxiety disorder    • Arthritis    • Bright red rectal bleeding    • Colon polyp    • DJD (degenerative joint disease)    • High cholesterol    • Hyperactivity of bladder    • Hypertension    • Macular degeneration    • Obesity    • Osteoporosis      Past Surgical History:   Procedure Laterality Date   • CHOLECYSTECTOMY     • COLONOSCOPY     • DXA PROCEDURE (HISTORICAL)  06/23/2021   • INTRAOCULAR LENS INSERTION     • MAMMO (HISTORICAL)  01/04/2022   • TOTAL SHOULDER REPLACEMENT Left      Social History     Substance and Sexual Activity   Alcohol Use No     Social History     Substance and Sexual Activity   Drug Use No     Social History     Tobacco Use   Smoking Status Former   • Types: Cigarettes   Smokeless Tobacco Never   Tobacco Comments    quit cigarettes age 33     Family History   Problem Relation Age of Onset   • No Known Problems Mother    • Esophageal cancer Father    • No Known Problems Sister    • No Known Problems Sister    • Esophageal cancer Maternal Grandmother    • No Known Problems Paternal Grandmother    • Breast cancer Maternal Aunt 79   • No Known Problems Maternal Aunt    • No Known Problems Maternal Aunt    • No Known Problems Maternal Aunt    • Skin cancer Paternal Aunt    • No Known Problems Paternal Aunt    • No Known Problems Paternal Aunt    • Cancer Paternal Aunt    • Breast cancer Cousin 68     Health Maintenance Due   Topic   • Hepatitis C Screening    • Medicare Annual Wellness Visit (AWV)    • BMI: Followup Plan    • Pneumococcal Vaccine: 65+ Years (1 - PCV)   • COVID-19 Vaccine (3 - Booster for Singh Peter series)      Meds/Allergies       Current Outpatient Medications:   •  Calcium Carb-Cholecalciferol 1000-800 MG-UNIT TABS, Calcium TABS  Refills: 0  Active, Disp: , Rfl:   •  cholecalciferol (VITAMIN D3) 1,000 units tablet, Vitamin D TABS  Refills: 0  Active, Disp: , Rfl:   •  Cinnamon 500 MG capsule, Cinnamon CAPS  Refills: 0  Active, Disp: , Rfl:   •  Cranberry 200 MG CAPS, Take by mouth in the morning, Disp: , Rfl:   •  Flaxseed, Linseed, (FLAX SEED OIL) 1000 MG CAPS, Flax Seed Oil 1000 MG Oral Capsule  Refills: 0  Active, Disp: , Rfl:   •  Multiple Vitamins-Minerals (PRESERVISION AREDS 2 PO), Take by mouth, Disp: , Rfl:   •  Omega-3 Fatty Acids (FISH OIL) 645 MG CAPS, Fish Oil CAPS  Refills: 0  Active, Disp: , Rfl:   •  Potassium 95 MG TABS, Potassium TABS  Refills: 0  Active, Disp: , Rfl:   •  telmisartan (MICARDIS) 80 MG tablet, TAKE ONE TABLET ONCE DAILY BY MOUTH, Disp: 90 tablet, Rfl: 1      Objective:    Vitals:   Pulse 78   Ht 5' 2" (1 575 m)   Wt 87 5 kg (193 lb)   SpO2 96%   BMI 35 30 kg/m²   Body mass index is 35 3 kg/m²  Vitals:    02/07/23 1437   Weight: 87 5 kg (193 lb)       Physical Exam  Vitals and nursing note reviewed  Constitutional:       Appearance: Normal appearance  She is obese  Cardiovascular:      Rate and Rhythm: Normal rate and regular rhythm  Heart sounds: Normal heart sounds  Pulmonary:      Effort: Pulmonary effort is normal       Breath sounds: Normal breath sounds     Musculoskeletal: Cervical back: Normal range of motion and neck supple  Right lower leg: No edema  Left lower leg: No edema  Neurological:      Mental Status: She is alert and oriented to person, place, and time  Lab Review   Orders Only on 01/06/2023   Component Date Value Ref Range Status   • Specific Gravity 01/06/2023 1 018  1 005 - 1 030 Final   • Ph 01/06/2023 7 5  5 0 - 7 5 Final   • Color UA 01/06/2023 Yellow  Yellow Final   • Urine Appearance 01/06/2023 Clear  Clear Final   • Leukocyte Esterase 01/06/2023 Negative  Negative Final   • Protein 01/06/2023 Negative  Negative/Trace Final   • Glucose, 24 HR Urine 01/06/2023 Negative  Negative Final   • Ketone, Urine 01/06/2023 Negative  Negative Final   • Blood, Urine 01/06/2023 Negative  Negative Final   • Bilirubin, Urine 01/06/2023 Negative  Negative Final   • Urobilinogen Urine 01/06/2023 0 2  0 2 - 1 0 mg/dL Final   • SL AMB NITRITES URINE, QUAL  01/06/2023 Negative  Negative Final   • Microscopic Examination 01/06/2023 Comment   Final    Microscopic follows if indicated  • Microscopic Examination 01/06/2023 See below:   Final    Microscopic was indicated and was performed  • Urinalysis Reflex 01/06/2023 Comment   Final    This specimen will not reflex to a Urine Culture  • SL AMB WBC, URINE 01/06/2023 None seen  0 - 5 /hpf Final   • RBC, Urine 01/06/2023 0-2  0 - 2 /hpf Final   • Epithelial Cells (non renal) 01/06/2023 None seen  0 - 10 /hpf Final   • Casts 01/06/2023 None seen  None seen /lpf Final   • Bacteria, Urine 01/06/2023 None seen  None seen/Few Final         Shiraz Aguiar MD        "This note has been constructed using a voice recognition system  Therefore there may be syntax, spelling, and/or grammatical errors   Please call if you have any questions  "

## 2023-02-07 NOTE — ASSESSMENT & PLAN NOTE
Patient with a BMI of 35 30 again emphasized regarding diet exercise cut back on carbohydrate intake calorie intake lifestyle modification

## 2023-02-07 NOTE — ASSESSMENT & PLAN NOTE
Last visit I recommended patient be seen by urogynecologist   According to the patient she tried to reach their office but no one is answering back    We will make another referral to a different urogynecologist

## 2023-02-10 ENCOUNTER — HOSPITAL ENCOUNTER (OUTPATIENT)
Dept: RADIOLOGY | Facility: HOSPITAL | Age: 78
Discharge: HOME | End: 2023-02-10
Attending: INTERNAL MEDICINE

## 2023-02-10 VITALS — BODY MASS INDEX: 35.51 KG/M2 | WEIGHT: 193 LBS | HEIGHT: 62 IN

## 2023-02-10 DIAGNOSIS — Z12.31 ENCOUNTER FOR SCREENING MAMMOGRAM FOR MALIGNANT NEOPLASM OF BREAST: ICD-10-CM

## 2023-02-28 ENCOUNTER — RA CDI HCC (OUTPATIENT)
Dept: OTHER | Facility: HOSPITAL | Age: 78
End: 2023-02-28

## 2023-02-28 NOTE — PROGRESS NOTES
E66 01  [facility]  coding opportunities          Chart Reviewed number of suggestions sent to Provider: 1     Patients Insurance     Medicare Insurance: [employer]

## 2023-03-05 PROBLEM — N39.0 URINARY TRACT INFECTION WITHOUT HEMATURIA: Status: RESOLVED | Noted: 2023-01-04 | Resolved: 2023-03-05

## 2023-03-07 ENCOUNTER — OFFICE VISIT (OUTPATIENT)
Dept: FAMILY MEDICINE CLINIC | Facility: CLINIC | Age: 78
End: 2023-03-07

## 2023-03-07 VITALS
HEIGHT: 62 IN | BODY MASS INDEX: 35.85 KG/M2 | WEIGHT: 194.8 LBS | SYSTOLIC BLOOD PRESSURE: 126 MMHG | DIASTOLIC BLOOD PRESSURE: 76 MMHG | HEART RATE: 76 BPM | OXYGEN SATURATION: 99 %

## 2023-03-07 DIAGNOSIS — E78.5 DYSLIPIDEMIA: ICD-10-CM

## 2023-03-07 DIAGNOSIS — M15.9 PRIMARY OSTEOARTHRITIS INVOLVING MULTIPLE JOINTS: ICD-10-CM

## 2023-03-07 DIAGNOSIS — R35.0 URINARY FREQUENCY: ICD-10-CM

## 2023-03-07 DIAGNOSIS — E66.09 CLASS 1 OBESITY DUE TO EXCESS CALORIES WITH SERIOUS COMORBIDITY AND BODY MASS INDEX (BMI) OF 34.0 TO 34.9 IN ADULT: ICD-10-CM

## 2023-03-07 DIAGNOSIS — E66.01 SEVERE OBESITY (BMI 35.0-39.9) WITH COMORBIDITY (HCC): ICD-10-CM

## 2023-03-07 DIAGNOSIS — I10 PRIMARY HYPERTENSION: ICD-10-CM

## 2023-03-07 DIAGNOSIS — R25.2 MUSCLE CRAMPS: Primary | ICD-10-CM

## 2023-03-07 RX ORDER — BIOTIN 1 MG
1000 TABLET ORAL 3 TIMES DAILY
COMMUNITY

## 2023-03-07 NOTE — ASSESSMENT & PLAN NOTE
Patient with increased frequency with urination I recommended patient be seen by the urogynecologist but unfortunately they are not responding    We will get a gynecological evaluation

## 2023-03-07 NOTE — ASSESSMENT & PLAN NOTE
Patient with episode of muscle cramps with severe pain in both calf area has taken some muscle relaxer to some extent he has relief  Recommend patient to drink more fluids take magnesium, stretching exercises prior to going to the bed and massage we will follow-up

## 2023-03-07 NOTE — ASSESSMENT & PLAN NOTE
History of hypercholesterolemia could not tolerate statins in the past again continue with diet exercise losing weight

## 2023-03-07 NOTE — ASSESSMENT & PLAN NOTE
Patient's current BMI is 35 63 again emphasized regarding diet cutting back on carbohydrate intake lifestyle modification cutting back on calorie intake  Also recommend patient to do few minutes of walking exercise on a daily basis

## 2023-03-07 NOTE — PROGRESS NOTES
Office Visit Note  23     Noel Jacob 69 y o  female MRN: [id number]  : 1945    Assessment:     1  Muscle cramps  Assessment & Plan:  Patient with episode of muscle cramps with severe pain in both calf area has taken some muscle relaxer to some extent he has relief  Recommend patient to drink more fluids take magnesium, stretching exercises prior to going to the bed and massage we will follow-up  2  Severe obesity (BMI 35 0-39  9) with comorbidity (HCC)    3  Class 1 obesity due to excess calories with serious comorbidity and body mass index (BMI) of 34 0 to 34 9 in adult  Assessment & Plan:  Patient's current BMI is 35 63 again emphasized regarding diet cutting back on carbohydrate intake lifestyle modification cutting back on calorie intake  Also recommend patient to do few minutes of walking exercise on a daily basis  4  Dyslipidemia  Assessment & Plan:  History of hypercholesterolemia could not tolerate statins in the past again continue with diet exercise losing weight      5  Primary hypertension  Assessment & Plan:  Blood pressure is stable 1978 continue with Micardis 80 mg daily      6  Primary osteoarthritis involving multiple joints  Assessment & Plan:  Patient with osteoarthritis on meloxicam on a as needed basis we will continue with the same  7  Urinary frequency  Assessment & Plan:  Patient with increased frequency with urination I recommended patient be seen by the urogynecologist but unfortunately they are not responding  We will get a gynecological evaluation        BMI Counseling: Body mass index is 35 63 kg/m²  The BMI is above normal  Nutrition recommendations include decreasing portion sizes, decreasing fast food intake, consuming healthier snacks, moderation in carbohydrate intake and reducing intake of cholesterol  Exercise recommendations include exercising 3-5 times per week  No pharmacotherapy was ordered   Rationale for BMI follow-up plan is due to patient being overweight or obese  Discussion Summary and Plan: Today's care plan and medications were reviewed with patient in detail and all their questions answered to their satisfaction  Chief Complaint   Patient presents with   • Follow-up   • leg cramps      Subjective:  Patient is coming here for evaluation regarding her hypertension, hyperlipidemia  Patient had an episode of severe cramps in both lower extremities when she was sleeping and the pain lasted for a while even now she is feeling some discomfort and she has some muscle relaxant medications at home which she took it appears to be helping  Patient usually occasionally gets the cramps but this was severe  No chest pains palpitation shortness of breath or any other associated symptoms medications reviewed  Labs reviewed  The following portions of the patient's history were reviewed and updated as appropriate: allergies, current medications, past family history, past medical history, past social history, past surgical history and problem list     Review of Systems   Constitutional: Negative for chills and fever  HENT: Negative for ear pain and sore throat  Eyes: Negative for pain and visual disturbance  Respiratory: Negative for cough and shortness of breath  Cardiovascular: Negative for chest pain and palpitations  Gastrointestinal: Negative for abdominal pain and vomiting  Genitourinary: Negative for dysuria and hematuria  Musculoskeletal: Negative for arthralgias and back pain  Muscle cramps   Skin: Negative for color change and rash  Neurological: Negative for seizures and syncope  All other systems reviewed and are negative          Historical Information   Patient Active Problem List   Diagnosis   • Pain in both feet   • Primary hypertension   • Class 1 obesity due to excess calories in adult   • Dyslipidemia   • Primary osteoarthritis involving multiple joints   • Urinary frequency   • Muscle cramps     Past Medical History:   Diagnosis Date   • Anxiety disorder    • Arthritis    • Bright red rectal bleeding    • Colon polyp    • DJD (degenerative joint disease)    • High cholesterol    • Hyperactivity of bladder    • Hypertension    • Macular degeneration    • Obesity    • Osteoporosis      Past Surgical History:   Procedure Laterality Date   • CHOLECYSTECTOMY     • COLONOSCOPY     • DXA PROCEDURE (HISTORICAL)  06/23/2021   • INTRAOCULAR LENS INSERTION     • MAMMO (HISTORICAL)  01/04/2022   • TOTAL SHOULDER REPLACEMENT Left      Social History     Substance and Sexual Activity   Alcohol Use No     Social History     Substance and Sexual Activity   Drug Use No     Social History     Tobacco Use   Smoking Status Former   • Types: Cigarettes   • Passive exposure: Past   Smokeless Tobacco Never   Tobacco Comments    quit cigarettes age 33     Family History   Problem Relation Age of Onset   • No Known Problems Mother    • Esophageal cancer Father    • No Known Problems Sister    • No Known Problems Sister    • Esophageal cancer Maternal Grandmother    • No Known Problems Paternal Grandmother    • Breast cancer Maternal Aunt 79   • No Known Problems Maternal Aunt    • No Known Problems Maternal Aunt    • No Known Problems Maternal Aunt    • Skin cancer Paternal Aunt    • No Known Problems Paternal Aunt    • No Known Problems Paternal Aunt    • Cancer Paternal Aunt    • Breast cancer Cousin 68     Health Maintenance Due   Topic   • Hepatitis C Screening    • Medicare Annual Wellness Visit (AWV)    • Pneumococcal Vaccine: 65+ Years (1 - PCV)   • COVID-19 Vaccine (3 - Booster for [employer] series)   • Colorectal Cancer Screening       Meds/Allergies       Current Outpatient Medications:   •  Biotin 1000 MCG tablet, Take 1,000 mcg by mouth 3 (three) times a day, Disp: , Rfl:   •  Calcium Carb-Cholecalciferol 1000-800 MG-UNIT TABS, Calcium TABS  Refills: 0  Active, Disp: , Rfl:   •  cholecalciferol (VITAMIN D3) 1,000 units tablet, Vitamin D TABS  Refills: 0  Active, Disp: , Rfl:   •  Cinnamon 500 MG capsule, Cinnamon CAPS  Refills: 0  Active, Disp: , Rfl:   •  Cranberry 200 MG CAPS, Take by mouth in the morning, Disp: , Rfl:   •  Flaxseed, Linseed, (FLAX SEED OIL) 1000 MG CAPS, Flax Seed Oil 1000 MG Oral Capsule  Refills: 0  Active, Disp: , Rfl:   •  Multiple Vitamins-Minerals (PRESERVISION AREDS 2 PO), Take by mouth, Disp: , Rfl:   •  Omega-3 Fatty Acids (FISH OIL) 645 MG CAPS, Fish Oil CAPS  Refills: 0  Active, Disp: , Rfl:   •  Potassium 95 MG TABS, Potassium TABS  Refills: 0  Active, Disp: , Rfl:   •  telmisartan (MICARDIS) 80 MG tablet, TAKE ONE TABLET ONCE DAILY BY MOUTH, Disp: 90 tablet, Rfl: 1      Objective:    Vitals:   /76 (BP Location: Right arm, Patient Position: Sitting, Cuff Size: Standard)   Pulse 76   Ht 5' 2" (1 575 m)   Wt 88 4 kg (194 lb 12 8 oz)   SpO2 99%   BMI 35 63 kg/m²   Body mass index is 35 63 kg/m²  Vitals:    03/07/23 1413   Weight: 88 4 kg (194 lb 12 8 oz)       Physical Exam  Vitals and nursing note reviewed  Constitutional:       Appearance: Normal appearance  Cardiovascular:      Rate and Rhythm: Normal rate and regular rhythm  Heart sounds: Normal heart sounds  Pulmonary:      Effort: Pulmonary effort is normal       Breath sounds: Normal breath sounds  Abdominal:      Palpations: Abdomen is soft  Musculoskeletal:      Cervical back: Normal range of motion and neck supple  Right lower leg: No edema  Left lower leg: No edema  Comments: Mild calf tenderness noted both sides   Neurological:      Mental Status: She is alert and oriented to person, place, and time  Lab Review   No visits with results within 2 Month(s) from this visit     Latest known visit with results is:   Orders Only on 01/06/2023   Component Date Value Ref Range Status   • Specific Gravity 01/06/2023 1 018  1 005 - 1 030 Final   • Ph 01/06/2023 7 5  5 0 - 7 5 Final   • Color UA 01/06/2023 Yellow  Yellow Final   • Urine Appearance 01/06/2023 Clear  Clear Final   • Leukocyte Esterase 01/06/2023 Negative  Negative Final   • Protein 01/06/2023 Negative  Negative/Trace Final   • Glucose, 24 HR Urine 01/06/2023 Negative  Negative Final   • Ketone, Urine 01/06/2023 Negative  Negative Final   • Blood, Urine 01/06/2023 Negative  Negative Final   • Bilirubin, Urine 01/06/2023 Negative  Negative Final   • Urobilinogen Urine 01/06/2023 0 2  0 2 - 1 0 mg/dL Final   • SL AMB NITRITES URINE, QUAL  01/06/2023 Negative  Negative Final   • Microscopic Examination 01/06/2023 Comment   Final    Microscopic follows if indicated  • Microscopic Examination 01/06/2023 See below:   Final    Microscopic was indicated and was performed  • Urinalysis Reflex 01/06/2023 Comment   Final    This specimen will not reflex to a Urine Culture  • SL AMB WBC, URINE 01/06/2023 None seen  0 - 5 /hpf Final   • RBC, Urine 01/06/2023 0-2  0 - 2 /hpf Final   • Epithelial Cells (non renal) 01/06/2023 None seen  0 - 10 /hpf Final   • Casts 01/06/2023 None seen  None seen /lpf Final   • Bacteria, Urine 01/06/2023 None seen  None seen/Few Final         Yuval Ring MD        "This note has been constructed using a voice recognition system  Therefore there may be syntax, spelling, and/or grammatical errors   Please call if you have any questions  "

## 2023-04-12 PROBLEM — R26.81 UNSTEADY GAIT: Status: ACTIVE | Noted: 2023-04-12

## 2023-05-01 ENCOUNTER — EVALUATION (OUTPATIENT)
Dept: PHYSICAL THERAPY | Facility: CLINIC | Age: 78
End: 2023-05-01

## 2023-05-01 DIAGNOSIS — R26.89 IMBALANCE: ICD-10-CM

## 2023-05-01 DIAGNOSIS — R26.81 UNSTEADY GAIT: Primary | ICD-10-CM

## 2023-05-01 NOTE — PROGRESS NOTES
PT Evaluation          POC expires Auth Status Total   Visits  Start date  Expiration date PT/OT + Visit Limit? Co-Insurance   23 N/A N/A N/A N/A PT, No Yes and $0 Co-pay                                           Visit/Unit Tracking  AUTH Status: N/A Date               Visits  Authed: N/A Used                Remaining                           Today's date: 2023  Patient name: Yamile Cardenas  : 1945  MRN: [id number]  Referring provider: Jerzy Bauman MD  Dx:   Encounter Diagnosis     ICD-10-CM    1  Unsteady gait  R26 81 Ambulatory Referral to Physical Therapy      2  Imbalance  R26 89             Assessment  Assessment details: Patient is a 69 y o  Female who presents to skilled outpatient PT with muscular weakness, instability while walking, and difficulty with functional activities  She demonstrated deficits in strength, functional mobility, dual tasking, functional balance and in aerobic endurance as shown by 5xSTS, TUG, TUG cog, FGA and 6 MWT respectively  As per APTA and Rehab Measures lab she is categorized at a HIGH risk for falls due to her scores on the outcomes measures listed above  She demonstrated greatest difficulty with balance while on complaint surfaces and when visual field is obstructed suggesting high visual dependency for balance which can further attribute to her risk for falls  Patient has increased fear of falling as per subjective report and observed activity avoidence when performing single limb activities such as stair climbing, curb negotiation and obstacle clearance; plan to formally assess fear avoidence behavior in future sessions  She falls bellow age normative values for her 6MWT and 10 meter walk test, her gait speed of 0 97 m/s places her in the limited community ambulatory category   Patient demonstrates increase in gait deviations such as hip hiking, increased postural sway and decreased foot clearance as she fatigued during the 6 MWT suggesting impaired tolerance to prolonged activity  Deficits in balance, strength and endurance have affected patient function and participation in her community as she has had to modify activities of daily life to account for her limitations  She will benefit from skilled outpatient PT services to address aforementioned limitations and maximize her independence by reducing her risk for falls  Impairments: Abnormal gait, Activity intolerance, Impaired balance, Impaired physical strength, Poor posture and Weight-bearing intolerance  Understanding of Dx/Px/POC: Good  Prognosis: Good    Patient verbalized understanding of POC  Please contact me if you have any questions or recommendations  Thank you for the referral and the opportunity to share in [facility] care          Plan  Plan details: Strength training, endurance training, gait training, balance  Patient would benefit from: Skilled PT  Planned modality interventions: Cryotherapy  Planned therapy interventions: Balance, Balance/WB training, Body mechanics training, Functional ROM exercises, Gait training, Neuromuscular re-education, Patient education, Postural training, Strengthening, Stretching, Therapeutic activities, Therapeutic exercises, Therapeutic training, Transfer training and Activity modification  Frequency: 2x/wk  Duration in weeks: 12  Plan of Care beginning date: 5-1-23  Plan of Care expiration date: 12 weeks - 7-  Treatment plan discussed with: Patient       Goals  Short Term Goals (4 weeks):    - Patient will improve time on TUG by 2 9 seconds from 15 98 seconds to 13 08 seconds to facilitate improved safety in all ambulation  - Patient will be independent in basic HEP 2-3 weeks  - Patient will improve 5xSTS score by 2 3 seconds from 12 3 seconds to 10 0 seconds to promote improved LE functional strength needed for ADLs  - Patient will complete components of HiMAT to promote agility necessary for sports related tasks    Long Term Goals (12 weeks):  - Patient will be independent in a comprehensive home exercise program  - Patient will improve scoring on DGI by 2 6 points from 15/24 to 11  to progress safety  - Patient will improve gait speed by 0 18 m/s from 0 97 m/s to 1 15 m/s to improve safety with community ambulation  - Patient will improve scoring on FGA by 4 points from  to  to progress safety with dynamic tasks  - Patient will be able to demonstrate HT in gait without veering  - Patient will improve 6 Minute Walk Test score by 190 feet from 1040 feet to 1230 feet to promote improved cardiovascular endurance  - Patient will report 50% reduction in near falls in order to improve safety with functional tasks and reduce his risk for falls  - Patient will report going on walks at least 3 days per week to promote independence and improved cardiovascular endurance  - Patient will be able to ascend/descend stairs reciprocally with 1 UE assist to promote independence and safety with ADLs  - Patient will report 50% reduction in near falls when ambulating on uneven terrain      Cut off score    All date taken from APTA Neuro Section or Rehab Measures      [place]/64  MDC: 6 pts  Age Norms:  61-76: M - 54   F - 55  70-79: M - 47   F - 53  80-89: M - 48   F - 50 5xSTS: Anastasiia et al 2010  MDC: 2 3 sec  Age Norms:  60-69: 11 1 sec  70-79: 12 6 sec  80-89: 14 8 sec   TUG  MDC: 4 14 sec  Cut off score:  >13 5 sec community dwelling adults  >32 2 frail elderly  <20 I for basic transfers  >30 dependent on transfers 10 Meter Walk Test: Catherine Bourgeois Margo al 2011  MDC: 0 18 m/s  20-29: M - 1 35 m   F - 1 34 m  30-39: M - 1 43 m   F - 1 34 m  40-49: M - 1 43 m   F - 1 39 m  50-59: M - 1 43 m   F - 1 31 m  60-69: M - 1 34 m   F - 1 24 m  70-79: M - 1 26 m   F - 1 13 m  80-89: M - 0 97 m   F - 0 94 m    Household Ambulator < 0 4 m/s  [facility] Ambulator 0 4 - 0 8 m/s  [facility] Ambulator 0 8 - 1 2 m/s  Safely cross the street > 1 2 m/s   FGA  MCID: 4 pts  Geriatrics/community < 22/30 fall risk  Geriatrics/community < 20/30 unexplained falls    DGI  MDC: vestibular - 4 pts  MDC: geriatric/community - 3 pts  Falls risk <19/24 mCTSIB  Norm: 20-60 yrs  Eyes open firm: norm sway 0 21-0 48  Eyes closed firm: norm sway 0 48-0 99  Eyes open foam: norm sway 0 38-0 71  Eyes closed foam: norm sway 0 70-2 22   6 Minute Walk Test  MDC: 190 98 ft  MCID: 164 ft    Age Norms  61-76: M - 1876 ft (571 80 m)  F - 1765 ft (537 98 m)  70-79: M - 1729 ft (527 00 m)  F - 1545 ft (470 92 m)  80-89: M - 1368 ft (416 97 m)  F - 1286 ft (391 97 m) ABC: Kt Dee, 2003  <67% increased risk for falls         Subjective    History of Present Illness  - Mechanism of injury: Patient presents to therapy with reports of unsteadiness and imbalance while on feet  She states that she noticed increase in staggering while walking starting ~5 years ago and has since then had a fear of falling  Patient states her fear of falling keep her from performing functional activities such as negotiating stairs without upper extremity support   She has since made modifications to her life and participation to account for her intolerance to activities such as prolonged standing, walking and stair negotiation     - Primary AD: none will use shopping cart in store  - Assist level at home:  helps with ADLs minimal use of stairs  - Decreased fine motor tasks: No      Pain  - Current pain ratin/10  - At best pain ratin/10  - At worst pain ratin/10  - Location: NA  - Aggravating factors: NA    Social Support  - Steps to enter house: small flight ~ 5 steps,w/ HR  - Stairs in house: 1 flight to upstairs, w/ HR  - Lives in: [employer]  - Lives with:     - Employment status: retired,  cook  - Hand dominance: R    Treatments  - Previous treatment: PT  - Current treatment: medication  - Diagnostic Testing: MRI: negative       Objective     LE MMT  - R Hip Flexion: 4-/5  L Hip Flexion: 3+/5  - R Hip Extension: 3+/5 L Hip Extension: 3+/5  - R Hip Abduction: 4/5  L Hip Abduction: 4/5  - R Hip Adduction: 4/5  L Hip Adduction: 4/5  - R Knee Extension: 4-/5 L Knee Extension: 4-/5  - R Knee Flexion: 4-/5  L Knee Flexion: 4-/5  - R Ankle DF: 3+/5  L Ankle DF: 4-/5  - R Ankle PF: 4/5  L Ankle PF: 4/5    Sensation  - Light touch: normal  - Deep pressure: normal      Coordination  - Heel to [place]: R side discoordinated  - Alternate Toe Taps: normal  - Finger to Nose: normal  - Finger to Finger: normal    Reflexes/Clonus  - Clonus: No,   - Patellar DTR: 2+: Normal    Myelopathy Screen (>3/5 +)  - Paul's Reflex: -  - Babinski Reflex: not asssed  - Inverted Supinator Sign: -  - Age > 45: Yes  - Gait Deviation: No    Gait  - Abnormalities: Increased trunk sway, decreased stance time on L, decreased foot clearance, increased L hip hike           Outcome Measures Initial Eval  5-1-23        5xSTS 12 30 sec        TUG  - Regular  - Cognitive   15 98 sec  19 36 sec        10 meter 0 97 m/s                 FGA 17/30        DGI 15/24        mCTSIB  - FTEO (firm)  - FTEC (firm)  - FTEO (foam)  - FTEC (foam)   30 sec  30 sec (+)  10 50 sec  3 72 sec        6MWT 1040 ft                                     Precautions: Macular Degeneration  Past Medical History:   Diagnosis Date    Anxiety disorder     Arthritis     Bright red rectal bleeding     Colon polyp     DJD (degenerative joint disease)     High cholesterol     Hyperactivity of bladder     Hypertension     Macular degeneration     Obesity     Osteoporosis

## 2023-05-03 ENCOUNTER — OFFICE VISIT (OUTPATIENT)
Dept: PHYSICAL THERAPY | Facility: CLINIC | Age: 78
End: 2023-05-03

## 2023-05-03 DIAGNOSIS — R26.81 UNSTEADY GAIT: Primary | ICD-10-CM

## 2023-05-03 DIAGNOSIS — R26.89 IMBALANCE: ICD-10-CM

## 2023-05-03 NOTE — PROGRESS NOTES
Daily Note   POC expires Auth Status Total   Visits  Start date  Expiration date PT/OT + Visit Limit? Co-Insurance   23 N/A N/A N/A N/A PT, No Yes and $0 Co-pay                                           Visit/Unit Tracking  AUTH Status: N/A Date               Visits  Authed: N/A Used                Remaining                      Today's date: 5/3/2023  Patient name: Val Garza  : 1945  MRN: [id number]  Referring provider: Pallavi Mejia MD  Dx:   Encounter Diagnosis     ICD-10-CM    1  Unsteady gait  R26 81       2  Imbalance  R26 89                      Subjective: Patient reports she wants to work on balance  At the end of the session she reports that she is unable to get into any area where there is no rail is open to trying with a cane in future sessions  Objective: See treatment diary below    NMR  - Sit to stands: 10 reps holding 5 5# Tidal Tank  - Sit to stands: 5 reps, 2 sets + EC (unable to perform with TT)  - Split stance on foam (one foot on firm ground/1 foot foam): 2 reps, reports not able to complete  - Split stance on foam  (one foot on firm ground/1 foot foam): 20 reps, 5 sec hold ea side  - Walking with head turns/head nods + carrying 5 5# Tidal tank: 50 ft, 4 cycles down/back for ea   - Side stepping on/off foam beam: 2 cycles down/back  - Backwards walking + dual tasking (naming ingredients for baking recipes): 50 ft, 4 cycles    TA  - Discussed using SPC and trial here so she can get around out in the community- please try next session      Assessment: Tolerated treatment well  Patient challenged with any balance in which she is unable to utilize UE support  She illustrates increased fear avoidance and at times will call out in fear when completing balance exercises  She does demonstrate increased medial to lateral sway with head movements, but is able to catch her balance independently  Discussed findings with patient and overall fair understanding demonstrated   Patient would benefit from continued PT focused on balance to enable her to reach her maximal level of potential        Plan: Continue per plan of care              Outcome Measures Initial Eval  5-1-23        5xSTS 12 30 sec        TUG  - Regular  - Cognitive   15 98 sec  19 36 sec        10 meter 0 97 m/s                 FGA 17/30        DGI 15/24        mCTSIB  - FTEO (firm)  - FTEC (firm)  - FTEO (foam)  - FTEC (foam)   30 sec  30 sec (+)  10 50 sec  3 72 sec        6MWT 1040 ft

## 2023-05-05 ENCOUNTER — HOSPITAL ENCOUNTER (OUTPATIENT)
Dept: RADIOLOGY | Facility: HOSPITAL | Age: 78
Discharge: HOME | End: 2023-05-05
Attending: INTERNAL MEDICINE

## 2023-05-05 DIAGNOSIS — R26.81 UNSTEADY GAIT: ICD-10-CM

## 2023-05-05 DIAGNOSIS — I10 PRIMARY HYPERTENSION: ICD-10-CM

## 2023-05-05 RX ORDER — TELMISARTAN 80 MG/1
TABLET ORAL
Qty: 90 TABLET | Refills: 1 | Status: SHIPPED | OUTPATIENT
Start: 2023-05-05

## 2023-05-08 ENCOUNTER — OFFICE VISIT (OUTPATIENT)
Dept: PHYSICAL THERAPY | Facility: CLINIC | Age: 78
End: 2023-05-08

## 2023-05-08 DIAGNOSIS — R26.81 UNSTEADY GAIT: Primary | ICD-10-CM

## 2023-05-08 DIAGNOSIS — R26.89 IMBALANCE: ICD-10-CM

## 2023-05-08 NOTE — PROGRESS NOTES
Daily Note   POC expires Auth Status Total   Visits  Start date  Expiration date PT/OT + Visit Limit? Co-Insurance   23 N/A N/A N/A N/A PT, No Yes and $0 Co-pay                                           Visit/Unit Tracking  AUTH Status: N/A Date               Visits  Authed: N/A Used                Remaining                      Today's date: 2023  Patient name: Kevin Herrera  : 1945  MRN: [id number]  Referring provider: Benito Guerrero MD  Dx:   Encounter Diagnosis     ICD-10-CM    1  Unsteady gait  R26 81       2  Imbalance  R26 89                      Subjective: Patient reports she had a sore       Objective: See treatment diary below    NMR  - Sit to stands to fatigue: 2 sets, 12/10 reps holding 5 5# Tidal Tank  - Sidestepping on foam beam: 4 laps, 5 ft down/back, 0 UE  - Staggered stance (1 foot on firm/1 foot on foam) w/ H head turns: 30 reps B/L, 0 UE  - Backwards walking + dual tasking (self ball toss): 25 ft, 2 cycles  - Stair negotiation (up/over  steps): 2 cycles, haptic UE  - SPC use w/ ambulation to lobby      Assessment: Patient able to tolerate treatment session well today with continued focus on higher level balance activities and divided attention  She displayed significant hesitation with exercises on compliant surfaces and reduced UE support resulting in decreased ability to volitionally  her feet until she has UE support  Most instability in SLS when increased WBing on LLE as compared to RLE  Educated the patient on appropriate height and use of SPC with ambulation and she demonstrated good proficiency  She will continue to benefit from skilled outpatient PT in order to maximize her function and balance in order to enable her to reach her maximal level of potential         Plan: Continue per plan of care            Outcome Measures Initial Eval  23        5xSTS 12 30 sec        TUG  - Regular  - Cognitive   15 98 sec  19 36 sec        10 meter 0 97 m/s FGA 17/30        DGI 15/24        mCTSIB  - FTEO (firm)  - FTEC (firm)  - FTEO (foam)  - FTEC (foam)   30 sec  30 sec (+)  10 50 sec  3 72 sec        6MWT 1040 ft

## 2023-05-09 ENCOUNTER — OFFICE VISIT (OUTPATIENT)
Dept: FAMILY MEDICINE CLINIC | Facility: CLINIC | Age: 78
End: 2023-05-09

## 2023-05-09 VITALS
DIASTOLIC BLOOD PRESSURE: 70 MMHG | OXYGEN SATURATION: 98 % | SYSTOLIC BLOOD PRESSURE: 120 MMHG | WEIGHT: 191.8 LBS | HEIGHT: 62 IN | BODY MASS INDEX: 35.3 KG/M2 | HEART RATE: 69 BPM

## 2023-05-09 DIAGNOSIS — R25.2 MUSCLE CRAMPS: ICD-10-CM

## 2023-05-09 DIAGNOSIS — R26.81 UNSTEADY GAIT: ICD-10-CM

## 2023-05-09 DIAGNOSIS — E78.5 DYSLIPIDEMIA: ICD-10-CM

## 2023-05-09 DIAGNOSIS — M15.9 PRIMARY OSTEOARTHRITIS INVOLVING MULTIPLE JOINTS: ICD-10-CM

## 2023-05-09 DIAGNOSIS — E66.09 CLASS 1 OBESITY DUE TO EXCESS CALORIES WITH SERIOUS COMORBIDITY AND BODY MASS INDEX (BMI) OF 34.0 TO 34.9 IN ADULT: ICD-10-CM

## 2023-05-09 DIAGNOSIS — J02.8 ACUTE PHARYNGITIS DUE TO OTHER SPECIFIED ORGANISMS: Primary | ICD-10-CM

## 2023-05-09 DIAGNOSIS — I10 PRIMARY HYPERTENSION: ICD-10-CM

## 2023-05-09 PROBLEM — M79.672 PAIN IN BOTH FEET: Status: RESOLVED | Noted: 2018-08-29 | Resolved: 2023-05-09

## 2023-05-09 PROBLEM — M79.671 PAIN IN BOTH FEET: Status: RESOLVED | Noted: 2018-08-29 | Resolved: 2023-05-09

## 2023-05-09 RX ORDER — AMOXICILLIN 500 MG/1
500 CAPSULE ORAL EVERY 8 HOURS SCHEDULED
Qty: 30 CAPSULE | Refills: 0 | Status: SHIPPED | OUTPATIENT
Start: 2023-05-09 | End: 2023-05-19

## 2023-05-09 RX ORDER — TELMISARTAN 40 MG/1
40 TABLET ORAL DAILY
Qty: 90 TABLET | Refills: 1 | Status: SHIPPED | OUTPATIENT
Start: 2023-05-09

## 2023-05-09 NOTE — ASSESSMENT & PLAN NOTE
Patient blood pressure is stable at 120/70 we will continue with Micardis 40 mg we will reorder the medications

## 2023-05-09 NOTE — ASSESSMENT & PLAN NOTE
Patient with URI symptoms with cough congestion not able to bring up much phlegm  No shortness of breath chest pains palpitations  Exam shows congestion in the throat some exudate noted    Plan is to monitor with salt water gargling symptomatic treatment if necessary antibiotic amoxicillin

## 2023-05-09 NOTE — ASSESSMENT & PLAN NOTE
Patient's BMI is 35 08 again emphasized regarding diet exercise cutting back calorie intake lifestyle modification

## 2023-05-09 NOTE — PROGRESS NOTES
Office Visit Note  23     Elina Salas 69 y o  female MRN: [id number]  : 1945    Assessment:     1  Acute pharyngitis due to other specified organisms  Assessment & Plan:  Patient with URI symptoms with cough congestion not able to bring up much phlegm  No shortness of breath chest pains palpitations  Exam shows congestion in the throat some exudate noted  Plan is to monitor with salt water gargling symptomatic treatment if necessary antibiotic Z-Everardo      2  Class 1 obesity due to excess calories with serious comorbidity and body mass index (BMI) of 34 0 to 34 9 in adult  Assessment & Plan:  Patient's BMI is 35 08 again emphasized regarding diet exercise cutting back calorie intake lifestyle modification  3  Dyslipidemia  Assessment & Plan:  Continue with strict diet could not tolerate the statins      4  Muscle cramps    5  Primary osteoarthritis involving multiple joints  Assessment & Plan:  Meloxicam as needed      6  Primary hypertension  Assessment & Plan:  Patient blood pressure is stable at 120/70 we will continue with Micardis 40 mg we will reorder the medications  7  Unsteady gait  Assessment & Plan:  Patient is being followed in the balance center getting physical therapy we will follow-up  Discussion Summary and Plan: Today's care plan and medications were reviewed with patient in detail and all their questions answered to their satisfaction  Chief Complaint   Patient presents with   • Sore Throat     Sore throat, sinus issues  Prone to strep  Dry cough occasionally  Jonathan Rothman        Subjective:  Patient has coming here for evaluation regarding symptoms of sore throat and also has some cough congestion symptoms not able to bring up much phlegm she also has some runny nose  No fever no nausea vomiting diarrhea or any other associated symptoms  No body aches  No chest pains palpitation shortness of breath    Medications reviewed labs reviewed      The following portions of the patient's history were reviewed and updated as appropriate: allergies, current medications, past family history, past medical history, past social history, past surgical history and problem list     Review of Systems   Constitutional: Negative for chills and fever  HENT: Positive for sinus pain and sore throat  Negative for ear pain  Eyes: Negative for pain and visual disturbance  Respiratory: Positive for cough  Negative for shortness of breath  Cardiovascular: Negative for chest pain and palpitations  Gastrointestinal: Negative for abdominal pain and vomiting  Genitourinary: Negative for dysuria and hematuria  Musculoskeletal: Negative for arthralgias and back pain  Skin: Negative for color change and rash  Neurological: Negative for seizures and syncope  All other systems reviewed and are negative          Historical Information   Patient Active Problem List   Diagnosis   • Primary hypertension   • Class 1 obesity due to excess calories in adult   • Dyslipidemia   • Primary osteoarthritis involving multiple joints   • Urinary frequency   • Muscle cramps   • Unsteady gait   • Acute pharyngitis due to other specified organisms     Past Medical History:   Diagnosis Date   • Anxiety disorder    • Arthritis    • Bright red rectal bleeding    • Colon polyp    • DJD (degenerative joint disease)    • High cholesterol    • Hyperactivity of bladder    • Hypertension    • Macular degeneration    • Obesity    • Osteoporosis      Past Surgical History:   Procedure Laterality Date   • CHOLECYSTECTOMY     • COLONOSCOPY     • DXA PROCEDURE (HISTORICAL)  06/23/2021   • INTRAOCULAR LENS INSERTION     • MAMMO (HISTORICAL)  01/04/2022   • TOTAL SHOULDER REPLACEMENT Left      Social History     Substance and Sexual Activity   Alcohol Use No     Social History     Substance and Sexual Activity   Drug Use No     Social History     Tobacco Use   Smoking Status Former   • Types: Cigarettes • Passive exposure: Past   Smokeless Tobacco Never   Tobacco Comments    quit cigarettes age 33     Family History   Problem Relation Age of Onset   • No Known Problems Mother    • Esophageal cancer Father    • No Known Problems Sister    • No Known Problems Sister    • Esophageal cancer Maternal Grandmother    • No Known Problems Paternal Grandmother    • Breast cancer Maternal Aunt 79   • No Known Problems Maternal Aunt    • No Known Problems Maternal Aunt    • No Known Problems Maternal Aunt    • Skin cancer Paternal Aunt    • No Known Problems Paternal Aunt    • No Known Problems Paternal Aunt    • Cancer Paternal Aunt    • Breast cancer Cousin 68     Health Maintenance Due   Topic   • Hepatitis C Screening    • Medicare Annual Wellness Visit (AWV)    • Pneumococcal Vaccine: 65+ Years (1 - PCV)   • COVID-19 Vaccine (3 - Booster for Singh Peter series)   • Colorectal Cancer Screening       Meds/Allergies       Current Outpatient Medications:   •  Biotin 1000 MCG tablet, Take 1,000 mcg by mouth 3 (three) times a day, Disp: , Rfl:   •  Calcium Carb-Cholecalciferol 1000-800 MG-UNIT TABS, Calcium TABS  Refills: 0  Active, Disp: , Rfl:   •  cholecalciferol (VITAMIN D3) 1,000 units tablet, Vitamin D TABS  Refills: 0  Active, Disp: , Rfl:   •  Cinnamon 500 MG capsule, Cinnamon CAPS  Refills: 0  Active, Disp: , Rfl:   •  Cranberry 200 MG CAPS, Take by mouth in the morning, Disp: , Rfl:   •  docusate sodium (COLACE) 50 mg capsule, Take by mouth, Disp: , Rfl:   •  Flaxseed, Linseed, (FLAX SEED OIL) 1000 MG CAPS, Flax Seed Oil 1000 MG Oral Capsule  Refills: 0  Active, Disp: , Rfl:   •  Multiple Vitamins-Minerals (PRESERVISION AREDS 2 PO), Take by mouth, Disp: , Rfl:   •  Omega-3 Fatty Acids (FISH OIL) 645 MG CAPS, Fish Oil CAPS  Refills: 0  Active, Disp: , Rfl:   •  Potassium 95 MG TABS, Potassium TABS  Refills: 0  Active, Disp: , Rfl:   •  telmisartan (MICARDIS) 80 MG tablet, TAKE ONE TABLET BY MOUTH EVERY DAY, Disp: 90 "tablet, Rfl: 1      Objective:    Vitals:   /70 (BP Location: Right arm, Patient Position: Sitting, Cuff Size: Standard)   Pulse 69   Ht 5' 2\" (1 575 m)   Wt 87 kg (191 lb 12 8 oz)   SpO2 98%   BMI 35 08 kg/m²   Body mass index is 35 08 kg/m²  Vitals:    05/09/23 1120   Weight: 87 kg (191 lb 12 8 oz)       Physical Exam  Vitals and nursing note reviewed  Constitutional:       Appearance: Normal appearance  HENT:      Mouth/Throat:      Mouth: Mucous membranes are moist       Comments: Congested  Cardiovascular:      Rate and Rhythm: Normal rate and regular rhythm  Heart sounds: Normal heart sounds  Pulmonary:      Effort: Pulmonary effort is normal       Breath sounds: Normal breath sounds  Musculoskeletal:      Cervical back: Normal range of motion and neck supple  Right lower leg: No edema  Left lower leg: No edema  Skin:     General: Skin is warm and dry  Capillary Refill: Capillary refill takes less than 2 seconds  Neurological:      Mental Status: She is alert and oriented to person, place, and time  Lab Review   No visits with results within 2 Month(s) from this visit     Latest known visit with results is:   Orders Only on 01/06/2023   Component Date Value Ref Range Status   • Specific Gravity 01/06/2023 1 018  1 005 - 1 030 Final   • Ph 01/06/2023 7 5  5 0 - 7 5 Final   • Color UA 01/06/2023 Yellow  Yellow Final   • Urine Appearance 01/06/2023 Clear  Clear Final   • Leukocyte Esterase 01/06/2023 Negative  Negative Final   • Protein 01/06/2023 Negative  Negative/Trace Final   • Glucose, 24 HR Urine 01/06/2023 Negative  Negative Final   • Ketone, Urine 01/06/2023 Negative  Negative Final   • Blood, Urine 01/06/2023 Negative  Negative Final   • Bilirubin, Urine 01/06/2023 Negative  Negative Final   • Urobilinogen Urine 01/06/2023 0 2  0 2 - 1 0 mg/dL Final   • SL AMB NITRITES URINE, QUAL  01/06/2023 Negative  Negative Final   • Microscopic Examination " "01/06/2023 Comment   Final    Microscopic follows if indicated  • Microscopic Examination 01/06/2023 See below:   Final    Microscopic was indicated and was performed  • Urinalysis Reflex 01/06/2023 Comment   Final    This specimen will not reflex to a Urine Culture  • SL AMB WBC, URINE 01/06/2023 None seen  0 - 5 /hpf Final   • RBC, Urine 01/06/2023 0-2  0 - 2 /hpf Final   • Epithelial Cells (non renal) 01/06/2023 None seen  0 - 10 /hpf Final   • Casts 01/06/2023 None seen  None seen /lpf Final   • Bacteria, Urine 01/06/2023 None seen  None seen/Few Final         Alen Juan MD        \"This note has been constructed using a voice recognition system  Therefore there may be syntax, spelling, and/or grammatical errors  Please call if you have any questions   \"  "

## 2023-05-10 ENCOUNTER — OFFICE VISIT (OUTPATIENT)
Dept: PHYSICAL THERAPY | Facility: CLINIC | Age: 78
End: 2023-05-10

## 2023-05-10 DIAGNOSIS — R26.81 UNSTEADY GAIT: Primary | ICD-10-CM

## 2023-05-10 DIAGNOSIS — R26.89 IMBALANCE: ICD-10-CM

## 2023-05-10 NOTE — PROGRESS NOTES
Daily Note   POC expires Auth Status Total   Visits  Start date  Expiration date PT/OT + Visit Limit? Co-Insurance   23 N/A N/A N/A N/A PT, No Yes and $0 Co-pay                                           Visit/Unit Tracking  AUTH Status: N/A Date               Visits  Authed: N/A Used                Remaining                      Today's date: 5/10/2023  Patient name: James Da Silva  : 1945  MRN: [id number]  Referring provider: Sonia Morgan MD  Dx:   Encounter Diagnosis     ICD-10-CM    1  Unsteady gait  R26 81       2  Imbalance  R26 89                      Subjective: Patient reports she isn't feeling great today after being put on Amoxicillin yesterday  Overall she feels more congested than yesterday  Objective: See treatment diary below    NMR  - Sit to stands to fatigue: 2 sets, 11/7 reps holding 5 5# Tidal Tank  - Sidestepping on foam beam: 3 laps, 5 ft down/back, 0 UE  - Backwards walking + dual tasking (self ball toss): 25 ft, 2 cycles  - Fwd ambulation w/ alternate hand to knee taps: 2 laps, 10 ft down/back, 0 UE  - Stair negotiation (up/over  steps): 6 cycles, haptic UE      Assessment: Patient able to tolerate treatment session fairly today with continued focus on higher level balance activities and divided attention  She required increased duration and frequency of seated rest breaks today as she displayed increased overall fatigue throughout the session  Patient remains with significant fear of falling with decreased UE support and increased SLS as she verbalized it throughout the session and displayed unsteadiness and hesitancy  She will continue to benefit from skilled outpatient PT in order to maximize her function and balance in order to enable her to reach her maximal level of potential         Plan: Continue per plan of care            Outcome Measures Initial Eval  23        5xSTS 12 30 sec        TUG  - Regular  - Cognitive   15 98 sec  19 36 sec        10 meter 0 97 m/s                 FGA 17/30        DGI 15/24        mCTSIB  - FTEO (firm)  - FTEC (firm)  - FTEO (foam)  - FTEC (foam)   30 sec  30 sec (+)  10 50 sec  3 72 sec        6MWT 1040 ft

## 2023-05-12 ENCOUNTER — TELEPHONE (OUTPATIENT)
Dept: FAMILY MEDICINE CLINIC | Facility: CLINIC | Age: 78
End: 2023-05-12

## 2023-05-12 DIAGNOSIS — J01.90 ACUTE NON-RECURRENT SINUSITIS, UNSPECIFIED LOCATION: Primary | ICD-10-CM

## 2023-05-12 RX ORDER — FLUTICASONE PROPIONATE 50 MCG
2 SPRAY, SUSPENSION (ML) NASAL DAILY
Qty: 11.1 ML | Refills: 1 | Status: SHIPPED | OUTPATIENT
Start: 2023-05-12

## 2023-05-12 RX ORDER — CEFUROXIME AXETIL 250 MG/1
250 TABLET ORAL EVERY 12 HOURS SCHEDULED
Qty: 20 TABLET | Refills: 0 | Status: SHIPPED | OUTPATIENT
Start: 2023-05-12 | End: 2023-05-22

## 2023-05-12 NOTE — TELEPHONE ENCOUNTER
Neisha is not doing any better.Was just here on the 9th.Was given amoxicillin.Can you order something stronger.

## 2023-05-15 ENCOUNTER — OFFICE VISIT (OUTPATIENT)
Dept: PHYSICAL THERAPY | Facility: CLINIC | Age: 78
End: 2023-05-15

## 2023-05-15 ENCOUNTER — TELEPHONE (OUTPATIENT)
Dept: GASTROENTEROLOGY | Facility: CLINIC | Age: 78
End: 2023-05-15

## 2023-05-15 DIAGNOSIS — R26.81 UNSTEADY GAIT: Primary | ICD-10-CM

## 2023-05-15 DIAGNOSIS — R26.89 IMBALANCE: ICD-10-CM

## 2023-05-15 NOTE — PROGRESS NOTES
"Daily Note   POC expires Auth Status Total   Visits  Start date  Expiration date PT/OT + Visit Limit? Co-Insurance   23 N/A N/A N/A N/A PT, No Yes and $0 Co-pay                                           Visit/Unit Tracking  AUTH Status: N/A Date               Visits  Authed: N/A Used                Remaining                      Today's date: 5/15/2023  Patient name: Gregorio Louis  : 1945  MRN: [id number]  Referring provider: Mart Yoon MD  Dx:   Encounter Diagnosis     ICD-10-CM    1  Unsteady gait  R26 81       2  Imbalance  R26 89                      Subjective: Patient reports having her Abx switched and not feeling worse  Still slight congestion but denies other complaints  Denies falls or pain  Objective: See treatment diary below  PT verbal and tactile cues for technique and progression  Gait belt on during balance activities  NMR  - Sit to stands to fatigue: 2 sets, 12 reps each set  holding 5 5# Tidal Tank  - Sidestepping on foam beam: 3 laps, 5 ft down/back, 0 UE- facing wall to reduce visual feedback  - Forward/ Backwards walking + dual tasking (self ball toss): 25 ft, 6 cycles  - Fwd ambulation w/ alternate hand to knee taps: 2 laps, 10 ft down/back, 0 UE  - Stair negotiation (up/over  steps): x 15 reps no UE- constant cues and verbal encouragement (walking in to step rather than starting from stop)- gait belt on    - Dynamic gait drills w/ head turns/ stop/ start x 4 minutes      Assessment: Patient able to tolerate treatment session fairly today with continued focus on higher level balance activities and divided attention  Able to progress exercises  Expressed apprehension and fear of falling with step over 6\" step (curb) without UE support- patient able to perform after verbal encouragement   She will continue to benefit from skilled outpatient PT in order to maximize her function and balance in order to enable her to reach her maximal level of " potential         Plan: Continue per plan of care            Outcome Measures Initial Eval  5-1-23        5xSTS 12 30 sec        TUG  - Regular  - Cognitive   15 98 sec  19 36 sec        10 meter 0 97 m/s                 FGA 17/30        DGI 15/24        mCTSIB  - FTEO (firm)  - FTEC (firm)  - FTEO (foam)  - FTEC (foam)   30 sec  30 sec (+)  10 50 sec  3 72 sec        6MWT 1040 ft

## 2023-05-17 ENCOUNTER — OFFICE VISIT (OUTPATIENT)
Dept: PHYSICAL THERAPY | Facility: CLINIC | Age: 78
End: 2023-05-17

## 2023-05-17 DIAGNOSIS — R26.81 UNSTEADY GAIT: Primary | ICD-10-CM

## 2023-05-17 DIAGNOSIS — R26.89 IMBALANCE: ICD-10-CM

## 2023-05-17 NOTE — PROGRESS NOTES
"Daily Note   POC expires Auth Status Total   Visits  Start date  Expiration date PT/OT + Visit Limit? Co-Insurance   23 N/A N/A N/A N/A PT, No Yes and $0 Co-pay                                           Visit/Unit Tracking  AUTH Status: N/A Date               Visits  Authed: N/A Used                Remaining                      Today's date: 2023  Patient name: Avila Floyd  : 1945  MRN: [id number]  Referring provider: Shahnaz Saeed MD  Dx:   Encounter Diagnosis     ICD-10-CM    1  Unsteady gait  R26 81       2  Imbalance  R26 89           Start Time: 1335  Stop Time: 1415  Total time in clinic (min): 40 minutes    Subjective: Patient reports having her Abx switched and not feeling worse  Still slight congestion but denies other complaints  Denies falls or pain  Objective: See treatment diary below  PT verbal and tactile cues for technique and progression  Gait belt on during balance activities  NMR   - Sit to stands FOAM on seat to fatigue: 2 sets, 12 reps each set  NO tidal tank today  - Sidestepping on foam beam: 1 laps, 5 ft down/back, 0 UE- facing wall to reduce visual feedback, pt very anxious and  disappointed about sway outside [facility]  - Forward/ Backwards walking + dual tasking (self ball toss): 25 ft, 6 cycles  -  Fwd ambulation w/ alternate hand to knee taps 1# each ankle : 3 laps, 10 ft down/back, 0 UE  - Stair negotiation (up/over  steps): x 15 reps no UE- constant cues and verbal encouragement (walking in to step rather than starting from stop)- gait belt on    - Dynamic gait drills w/ head turns and cog dual tasking count bward, and  stop/ start x 4 minutes  -step up/over 6\" curb no UE support 8 reps w/gait belt       Assessment: Patient able to tolerate treatment session fairly today with continued focus on higher level balance activities and divided attention  Able to progress exercises   Resumed  step over 6\" step (curb) without UE support with either LE able " to lead  She will continue to benefit from skilled outpatient PT in order to maximize her function and balance in order to enable her to reach her maximal level of potential         Plan: Continue per plan of care            Outcome Measures Initial Eval  5-1-23        5xSTS 12 30 sec        TUG  - Regular  - Cognitive   15 98 sec  19 36 sec        10 meter 0 97 m/s                 FGA 17/30        DGI 15/24        mCTSIB  - FTEO (firm)  - FTEC (firm)  - FTEO (foam)  - FTEC (foam)   30 sec  30 sec (+)  10 50 sec  3 72 sec        6MWT 1040 ft

## 2023-05-23 ENCOUNTER — OFFICE VISIT (OUTPATIENT)
Dept: PHYSICAL THERAPY | Facility: CLINIC | Age: 78
End: 2023-05-23

## 2023-05-23 DIAGNOSIS — R26.89 IMBALANCE: ICD-10-CM

## 2023-05-23 DIAGNOSIS — R26.81 UNSTEADY GAIT: Primary | ICD-10-CM

## 2023-05-23 NOTE — PROGRESS NOTES
"Daily Note   POC expires Auth Status Total   Visits  Start date  Expiration date PT/OT + Visit Limit? Co-Insurance   23 N/A N/A N/A N/A PT, No Yes and $0 Co-pay                                           Visit/Unit Tracking  AUTH Status: N/A Date               Visits  Authed: N/A Used                Remaining                      Today's date: 2023  Patient name: John Siu  : 1945  MRN: [id number]  Referring provider: Sami St MD  Dx:   Encounter Diagnosis     ICD-10-CM    1  Unsteady gait  R26 81       2  Imbalance  R26 89                      Subjective: Patient reports no new changes, complaints, or falls  Objective: See treatment diary below  PT verbal and tactile cues for technique and progression  Gait belt on during balance activities  NMR   - Sit to stands to fatigue: 3 sets, 6/6/5 reps each set  - Sidestepping on foam beam: 2 laps, 5 ft down/back, 0 UE  - Fwd ambulation w/ pickle ball (using yellow TBand ball): 1 lap, 150 ft B/L  - Bouncing/catching blue PBall: 30 reps  - Stair negotiation w/ 2 tiny tanks (4\" side): 6 cycles, 0 UE  - Fwd/bwd ambulation w/ head turns/nods: 2 laps, 50 ft down/back      Assessment: Patient able to tolerate treatment session well today with continued focus on higher level balance activities and divided attention  Good ability to self correct via both stepping strategy and hip strategy during reactive and anticipatory balance challenges  Overall improvements in fatigability as she was able to increase number of repetitions and decrease duration of rest breaks  She will continue to benefit from skilled outpatient PT in order to maximize her function and balance in order to enable her to reach her maximal level of potential         Plan: Continue per plan of care            Outcome Measures Initial Eval  23        5xSTS 12 30 sec        TUG  - Regular  - Cognitive   15 98 sec  19 36 sec        10 meter 0 97 m/s                 FGA      " DGI 15/24        mCTSIB  - FTEO (firm)  - FTEC (firm)  - FTEO (foam)  - FTEC (foam)   30 sec  30 sec (+)  10 50 sec  3 72 sec        6MWT 1040 ft

## 2023-05-24 ENCOUNTER — OFFICE VISIT (OUTPATIENT)
Dept: PHYSICAL THERAPY | Facility: CLINIC | Age: 78
End: 2023-05-24

## 2023-05-24 DIAGNOSIS — R26.81 UNSTEADY GAIT: Primary | ICD-10-CM

## 2023-05-24 DIAGNOSIS — R26.89 IMBALANCE: ICD-10-CM

## 2023-05-24 NOTE — PROGRESS NOTES
Daily Note   POC expires Auth Status Total   Visits  Start date  Expiration date PT/OT + Visit Limit? Co-Insurance   23 N/A N/A N/A N/A PT, No Yes and $0 Co-pay                                           Visit/Unit Tracking  AUTH Status: N/A Date               Visits  Authed: N/A Used                Remaining                      Today's date: 2023  Patient name: Christal Garcia  : 1945  MRN: [id number]  Referring provider: Ana Cannon MD  Dx:   Encounter Diagnosis     ICD-10-CM    1  Unsteady gait  R26 81       2  Imbalance  R26 89                      Subjective: Patient reports no new changes, complaints, or falls  Objective: See treatment diary below  PT verbal and tactile cues for technique and progression  Gait belt on during balance activities  NMR:  - Sit to stands to fatigue: 3 sets, 7 reps each set  - Sidestepping on foam beam: 2 laps, 5 ft down/back, 0 UE  - Biodex   - LoS: easy, no level set, 16%   - LoS: easy, no level set, 37%   - LoS: easy, no level set, 36%   - LoS: medium, no level set, 34%   - LoS: medium, no level set, 23%   - LoS: medium, no level set, 16%      Assessment: Patient able to tolerate treatment session well today with increased focus on weight shifting today as she has significant difficulty using hip/ankle strategies to perform small weight shifts outside [facility]  Fair improvements in weight shifting with Biodex training with plan to continue using in her sessions  Heavy discussion at end of session surrounding stressors in her life which can lead to increased fear of falling  She will continue to benefit from skilled outpatient PT in order to maximize her function and balance in order to enable her to reach her maximal level of potential         Plan: Continue per plan of care            Outcome Measures Initial Eval  23        5xSTS 12 30 sec        TUG  - Regular  - Cognitive   15 98 sec  19 36 sec        10 meter 0 97 m/s                 FGA  DGI 15/24        mCTSIB  - FTEO (firm)  - FTEC (firm)  - FTEO (foam)  - FTEC (foam)   30 sec  30 sec (+)  10 50 sec  3 72 sec        6MWT 1040 ft

## 2023-05-31 ENCOUNTER — OFFICE VISIT (OUTPATIENT)
Dept: PHYSICAL THERAPY | Facility: CLINIC | Age: 78
End: 2023-05-31

## 2023-05-31 DIAGNOSIS — R26.81 UNSTEADY GAIT: Primary | ICD-10-CM

## 2023-05-31 DIAGNOSIS — R26.89 IMBALANCE: ICD-10-CM

## 2023-05-31 NOTE — PROGRESS NOTES
Daily Note   POC expires Auth Status Total   Visits  Start date  Expiration date PT/OT + Visit Limit? Co-Insurance   23 N/A N/A N/A N/A PT, No Yes and $0 Co-pay                                           Visit/Unit Tracking  AUTH Status: N/A Date               Visits  Authed: N/A Used                Remaining                      Today's date: 2023  Patient name: Terry Lord  : 1945  MRN: [id number]  Referring provider: Demetrius Rebollar MD  Dx:   Encounter Diagnosis     ICD-10-CM    1  Unsteady gait  R26 81       2  Imbalance  R26 89                      Subjective: Patient reports no new changes, complaints, or falls  Objective: See treatment diary below  PT verbal and tactile cues for technique and progression  Gait belt on during balance activities  NMR:  - Sit to stands to fatigue: 3 sets, 7 reps each set  - Sidestepping on foam beam: 4 laps, 5 ft down/back, 0 UE  - Biodex   - LoS: hard, no level set, 6%   - LoS: hard, no level set, 10%   - LoS: hard, no level set, 9%   - Maze: easy, no level set, -71%   - Maze: easy, no level set, -146%   - Maze: easy, no level set, 17%      Assessment: Patient able to tolerate treatment session well today with continued focus on weight shifting  Improvements in use of hip/ankle strategies during biodex training with ability to progress to more challenging levels  She will continue to benefit from skilled outpatient PT in order to maximize her function and balance in order to enable her to reach her maximal level of potential       Plan: Continue per plan of care            Outcome Measures Initial Eval  23        5xSTS 12 30 sec        TUG  - Regular  - Cognitive   15 98 sec  19 36 sec        10 meter 0 97 m/s                 FGA         DGI 15/24        mCTSIB  - FTEO (firm)  - FTEC (firm)  - FTEO (foam)  - FTEC (foam)   30 sec  30 sec (+)  10 50 sec  3 72 sec        6MWT 1040 ft

## 2023-06-05 ENCOUNTER — APPOINTMENT (OUTPATIENT)
Dept: PHYSICAL THERAPY | Facility: CLINIC | Age: 78
End: 2023-06-05
Payer: MEDICARE

## 2023-06-07 ENCOUNTER — OFFICE VISIT (OUTPATIENT)
Dept: PHYSICAL THERAPY | Facility: CLINIC | Age: 78
End: 2023-06-07
Payer: MEDICARE

## 2023-06-07 DIAGNOSIS — R26.81 UNSTEADY GAIT: Primary | ICD-10-CM

## 2023-06-07 DIAGNOSIS — R26.89 IMBALANCE: ICD-10-CM

## 2023-06-07 PROCEDURE — 97112 NEUROMUSCULAR REEDUCATION: CPT

## 2023-06-07 NOTE — PROGRESS NOTES
Daily Note   POC expires Auth Status Total   Visits  Start date  Expiration date PT/OT + Visit Limit? Co-Insurance   23 N/A N/A N/A N/A PT, No Yes and $0 Co-pay                                           Visit/Unit Tracking  AUTH Status: N/A Date               Visits  Authed: N/A Used                Remaining                      Today's date: 2023  Patient name: Dada Rooney  : 1945  MRN: [id number]  Referring provider: Mehul Arambula MD  Dx:   Encounter Diagnosis     ICD-10-CM    1  Unsteady gait  R26 81       2  Imbalance  R26 89                      Subjective: Patient reports to PT session stating she feels her eyes are bothered due to the smoke outside  She additionally notes sleeping poorly last night  Objective: See treatment diary below  PT verbal and tactile cues for technique and progression  Gait belt on during balance activities  NMR:  - Sit to stands to fatigue: 3 sets, 7 reps each set  - Sidestepping on foam beam: 6 laps, 5 ft down/back, 0 UE  - Biodex   - LoS: hard, no level set, 14%   - LoS: hard, no level set, 13%   - LoS: medium, no level set, 34%   - Maze: easy, no level set, 28%   - Maze: medium, no level set, 0%   - Maze: medium, no level set, -12%  - Step ups to river rocks (small) x 20 reps  - A-P WS (firm, EO) x 20 reps      Assessment: Patient able to tolerate treatment session well today with continued focus on weight shifting  Patient with overall tendency to rely on hip strategy for successful completion of Biodex trials (particularly with LOS) and required VC to avoid moving her feet on the plate  At times patient expressed frustration with her progress in PT  Demonstrated fatigue with increasing number of Biodex repetitions  She will continue to benefit from skilled outpatient PT in order to maximize her function and balance in order to enable her to reach her maximal level of potential       Plan: Continue per plan of care            Outcome Measures Initial Eval  5-1-23        5xSTS 12 30 sec        TUG  - Regular  - Cognitive   15 98 sec  19 36 sec        10 meter 0 97 m/s                 FGA 17/30        DGI 15/24        mCTSIB  - FTEO (firm)  - FTEC (firm)  - FTEO (foam)  - FTEC (foam)   30 sec  30 sec (+)  10 50 sec  3 72 sec        6MWT 1040 ft

## 2023-06-12 ENCOUNTER — OFFICE VISIT (OUTPATIENT)
Dept: PHYSICAL THERAPY | Facility: CLINIC | Age: 78
End: 2023-06-12
Payer: MEDICARE

## 2023-06-12 ENCOUNTER — OFFICE VISIT (OUTPATIENT)
Dept: PODIATRY | Facility: CLINIC | Age: 78
End: 2023-06-12
Payer: MEDICARE

## 2023-06-12 VITALS
WEIGHT: 191 LBS | RESPIRATION RATE: 17 BRPM | DIASTOLIC BLOOD PRESSURE: 70 MMHG | HEIGHT: 62 IN | BODY MASS INDEX: 35.15 KG/M2 | SYSTOLIC BLOOD PRESSURE: 120 MMHG

## 2023-06-12 DIAGNOSIS — R26.89 IMBALANCE: ICD-10-CM

## 2023-06-12 DIAGNOSIS — R26.81 UNSTEADY GAIT: Primary | ICD-10-CM

## 2023-06-12 DIAGNOSIS — M79.672 PAIN IN BOTH FEET: ICD-10-CM

## 2023-06-12 DIAGNOSIS — I70.209 PERIPHERAL ARTERIOSCLEROSIS (HCC): Primary | ICD-10-CM

## 2023-06-12 DIAGNOSIS — M79.671 PAIN IN BOTH FEET: ICD-10-CM

## 2023-06-12 DIAGNOSIS — B35.1 ONYCHOMYCOSIS: ICD-10-CM

## 2023-06-12 PROCEDURE — 97112 NEUROMUSCULAR REEDUCATION: CPT

## 2023-06-12 PROCEDURE — 11721 DEBRIDE NAIL 6 OR MORE: CPT | Performed by: PODIATRIST

## 2023-06-12 NOTE — PROGRESS NOTES
Assessment/Plan:  Pain   Mycosis of nail   Paronychia   Peripheral artery disease      Plan   Foot exam performed   Patient educated on condition   All mycotic nails debrided without pain or complication      Subjective:   Patient complains of pain in her feet with ambulation   No history of trauma              Past Medical History:   Diagnosis Date   • Hypertension     • Macular degeneration                     Past Surgical History:   Procedure Laterality Date   • INTRAOCULAR LENS INSERTION                       Allergies   Allergen Reactions   • Atorvastatin Other (See Comments)       legs get stiff   • Zithromax [Azithromycin]     • Erythromycin Base Palpitations            Current Outpatient Prescriptions:   •  Calcium Carb-Cholecalciferol (CALCIUM 1000 + D) 1000-800 MG-UNIT TABS, Calcium TABS  Refills: 0  Active, Disp: , Rfl:   •  cholecalciferol (VITAMIN D3) 1,000 units tablet, Vitamin D TABS  Refills: 0  Active, Disp: , Rfl:   •  Cinnamon 500 MG capsule, Cinnamon CAPS  Refills: 0  Active, Disp: , Rfl:   •  cycloSPORINE (RESTASIS) 0 05 % ophthalmic emulsion, Restasis 0 05 % Ophthalmic Emulsion  Refills: 0  Active, Disp: , Rfl:   •  Flaxseed, Linseed, (FLAX SEED OIL) 1000 MG CAPS, Flax Seed Oil 1000 MG Oral Capsule  Refills: 0  Active, Disp: , Rfl:   •  fluticasone (FLONASE) 50 mcg/act nasal spray, Fluticasone Propionate 50 MCG/ACT Nasal Suspension  Quantity: 16;  Refills: 0   Started 29-Nov-2014 Active, Disp: , Rfl:   •  Magnesium 100 MG CAPS, Magnesium TABS  Refills: 0  Active, Disp: , Rfl:   •  Omega-3 Fatty Acids (FISH OIL) 645 MG CAPS, Fish Oil CAPS  Refills: 0  Active, Disp: , Rfl:   •  Potassium 95 MG TABS, Potassium TABS  Refills: 0  Active, Disp: , Rfl:   •  telmisartan (MICARDIS) 80 MG tablet, Micardis 80 MG Oral Tablet  Refills: 0  Active, Disp: , Rfl:      There is no problem list on file for this patient             Patient ID: Neisha Saalzar is a 76 y  o  female      HPI     The following portions of the patient's history were reviewed and updated as appropriate: allergies, current medications, past family history, past medical history, past social history, past surgical history and problem list      Review of Systems       Objective:  Patient's shoes and socks removed    Foot ExamPhysical Exam             Physical Exam  Left Foot: Appearance: Normal except as noted: excessive supination\R\R\b0pes cavus  Tenderness: None except the great toe,\R\n5usnmhs longitudinal arch\R\R\p3yrevoxxsa of the plantar fascia  Right Foot: Appearance: Normal except as noted: excessive supination\R\R\b0pes cavus  Tenderness: None except the medial longitudinal arch\R\R\d3xbdvgrmrx of the plantar fascia  Left Ankle: ROM: limited ROM in all planes   Right Ankle: ROM: limited ROM in all planes   Neurological Exam: performed  Light touch was intact bilaterally  Vibratory sensation was intact bilaterally  Response to monofilament test was intact bilaterally  Deep tendon reflexes: patellar reflex present bilaterally\R\R\z8zlqlufpm reflex present bilaterally  Vascular Exam: performed Dorsalis pedis pulses were diminished bilaterally  Posterior tibial pulses were diminished bilaterally  Elevation Pallor: present bilaterally  Capillary refill time was greater than 3 seconds bilaterally\R\R\c8Uehnl 8 findings bilateral negative digital hair noted all mycotic nails debrided today  Edema: mild bilaterally   These are Q, 9 findings bilateral  Toenails: All of the toenails were elongated,\R\o0ggfdeinkvvjtg,\R\x8gfrvgpjzfw,\R\t4usthqyr,\R\y9skxxuq\R\R\y2Jddkigm  Hyperkeratosis: present on both first sub metatarsals     Shoe Gear Evaluation: Recommendation(s): custom inlays\R\R\b0SAS style

## 2023-06-12 NOTE — PROGRESS NOTES
Daily Note   POC expires Auth Status Total   Visits  Start date  Expiration date PT/OT + Visit Limit? Co-Insurance   23 N/A N/A N/A N/A PT, No Yes and $0 Co-pay                                           Visit/Unit Tracking  AUTH Status: N/A Date               Visits  Authed: N/A Used                Remaining                      Today's date: 2023  Patient name: David Song  : 1945  MRN: [id number]  Referring provider: Maryuri Cummins MD  Dx:   Encounter Diagnosis     ICD-10-CM    1  Unsteady gait  R26 81       2  Imbalance  R26 89                      Subjective: Patient reports to PT session denies falls or pain  Objective: See treatment diary below  PT verbal and tactile cues for technique and progression  NMR:  - Sit to stands to fatigue: 3 sets,10  reps each set  - Sidestepping on foam beam: 6 laps, 5 ft down/back, 0 UE  - Biodex   - LoS easy: hard, no level set, 32%   - LoS med: hard, no level set, 10%   - LoS med: medium, no level set, 22%   - Maze: medium, no level set, -142%   - Maze: medium, no level set, 22%   - Maze: medium, no level set, 23%  - Step ups to river rocks (small) x 20 reps  - A-P WS (firm, EO) x 20 reps      Assessment: Patient able to tolerate treatment session well today with continued focus on weight shifting  Able to progress reps with ex's today without complaint  She will continue to benefit from skilled outpatient PT in order to maximize her function and balance in order to enable her to reach her maximal level of potential       Plan: Continue per plan of care            Outcome Measures Initial Eval  23        5xSTS 12 30 sec        TUG  - Regular  - Cognitive   15 98 sec  19 36 sec        10 meter 0 97 m/s                 FGA         DGI 15/24        mCTSIB  - FTEO (firm)  - FTEC (firm)  - FTEO (foam)  - FTEC (foam)   30 sec  30 sec (+)  10 50 sec  3 72 sec        6MWT 1040 ft

## 2023-06-14 ENCOUNTER — EVALUATION (OUTPATIENT)
Dept: PHYSICAL THERAPY | Facility: CLINIC | Age: 78
End: 2023-06-14
Payer: MEDICARE

## 2023-06-14 DIAGNOSIS — R26.81 UNSTEADY GAIT: Primary | ICD-10-CM

## 2023-06-14 DIAGNOSIS — R26.89 IMBALANCE: ICD-10-CM

## 2023-06-14 PROCEDURE — 97530 THERAPEUTIC ACTIVITIES: CPT | Performed by: PHYSICAL THERAPIST

## 2023-06-14 NOTE — PROGRESS NOTES
PT Re-Evaluation /Progress Note         POC expires Auth Status Total   Visits  Start date  Expiration date PT/OT + Visit Limit? Co-Insurance   23 N/A N/A N/A N/A PT, No Yes and $0 Co-pay                                           Visit/Unit Tracking  AUTH Status: N/A Date               Visits  Authed: N/A Used                Remaining                           Today's date: 2023  Patient name: Torrey Hayes  : 1945  MRN: [id number]  Referring provider: Homa Fox MD  Dx:   Encounter Diagnosis     ICD-10-CM    1  Unsteady gait  R26 81       2  Imbalance  R26 89             Assessment  Assessment details: Patient is a 69 y o  Female who has been reporting to skilled outpatient PT with deficits in her LE strength, balance, and functional endurance impacting her ability to perform ADLs and ambulation safely  She demonstrated significant improvements in these areas as indicated by scores associated with TUG variations, DGI, and 10 meter walk test  Difference between TUG and TUG cognitive as well as results of FGA and DGI depict the patient remains at HIGH risk for falls  No significant changes in functional cardio capacity as displayed via 6MWT  She remains with most instability and fear of falling on compliant surfaces and with EC with difficulty performing balance strategies without PT intervention  She will continue to benefit from skilled outpatient PT in order to address aforementioned limitations and maximize her independence by reducing her risk for falls  Impairments: Abnormal gait, Activity intolerance, Impaired balance, Impaired physical strength, Poor posture and Weight-bearing intolerance  Understanding of Dx/Px/POC: Good  Prognosis: Good    Patient verbalized understanding of POC  Please contact me if you have any questions or recommendations   Thank you for the referral and the opportunity to share in [facility] care          Plan  Plan details: Strength training, endurance training, gait training, balance  Patient would benefit from: Skilled PT  Planned modality interventions: Cryotherapy  Planned therapy interventions: Balance, Balance/WB training, Body mechanics training, Functional ROM exercises, Gait training, Neuromuscular re-education, Patient education, Postural training, Strengthening, Stretching, Therapeutic activities, Therapeutic exercises, Therapeutic training, Transfer training and Activity modification  Frequency: 2x/wk  Duration in weeks: 12  Plan of Care beginning date: 5-1-23  Plan of Care expiration date: 12 weeks - 7-  Treatment plan discussed with: Patient       Goals  Short Term Goals (4 weeks):    - Patient will improve time on TUG by 2 9 seconds from 15 98 seconds to 13 08 seconds to facilitate improved safety in all ambulation  - Patient will be independent in basic HEP 2-3 weeks  - Patient will improve 5xSTS score by 2 3 seconds from 12 3 seconds to 10 0 seconds to promote improved LE functional strength needed for ADLs  - Patient will complete components of HiMAT to promote agility necessary for sports related tasks    Long Term Goals (12 weeks):  - Patient will be independent in a comprehensive home exercise program  - Patient will improve scoring on DGI by 2 6 points from 15/24 to 11 4/24 to progress safety  - Patient will improve gait speed by 0 18 m/s from 0 97 m/s to 1 15 m/s to improve safety with community ambulation  - Patient will improve scoring on FGA by 4 points from 17/30 to 21/30 to progress safety with dynamic tasks  - Patient will be able to demonstrate HT in gait without veering  - Patient will improve 6 Minute Walk Test score by 190 feet from 1040 feet to 1230 feet to promote improved cardiovascular endurance  - Patient will report 50% reduction in near falls in order to improve safety with functional tasks and reduce his risk for falls  - Patient will report going on walks at least 3 days per week to promote independence and improved cardiovascular endurance  - Patient will be able to ascend/descend stairs reciprocally with 1 UE assist to promote independence and safety with ADLs  - Patient will report 50% reduction in near falls when ambulating on uneven terrain      Cut off score    All date taken from [facility] Neuro Section or Rehab Measures      [place]/56  MDC: 6 pts  Age Norms:  61-76: M - 54   F - 55  70-79: M - 47   F - 53  80-89: M - 53   F - 50 5xSTS: Anastasiia et al 2010  MDC: 2 3 sec  Age Norms:  60-69: 11 1 sec  70-79: 12 6 sec  80-89: 14 8 sec   TUG  MDC: 4 14 sec  Cut off score:  >13 5 sec community dwelling adults  >32 2 frail elderly  <20 I for basic transfers  >30 dependent on transfers 10 Meter Walk Test: yC castillo 2011  MDC: 0 18 m/s  20-29: M - 1 35 m   F - 1 34 m  30-39: M - 1 43 m   F - 1 34 m  40-49: M - 1 43 m   F - 1 39 m  50-59: M - 1 43 m   F - 1 31 m  60-69: M - 1 34 m   F - 1 24 m  70-79: M - 1 26 m   F - 1 13 m  80-89: M - 0 97 m   F - 0 94 m    Household Ambulator < 0 4 m/s  Limited Community Ambulator 0 4 - 0 8 m/s  [employer] Ambulator 0 8 - 1 2 m/s  Safely cross the street > 1 2 m/s   FGA  MCID: 4 pts  Geriatrics/community < 22/30 fall risk  Geriatrics/community < 20/30 unexplained falls    DGI  MDC: vestibular - 4 pts  MDC: geriatric/community - 3 pts  Falls risk <19/24 mCTSIB  Norm: 20-60 yrs  Eyes open firm: norm sway 0 21-0 48  Eyes closed firm: norm sway 0 48-0 99  Eyes open foam: norm sway 0 38-0 71  Eyes closed foam: norm sway 0 70-2 22   6 Minute Walk Test  MDC: 190 98 ft  MCID: 164 ft    Age Norms  61-76: M - 1876 ft (571 80 m)  F - 1765 ft (537 98 m)  70-79: M - 1729 ft (527 00 m)  F - 1545 ft (470 92 m)  80-89: M - 1368 ft (416 97 m)  F - 1286 ft (391 97 m) ABC: [facility], 2003  <67% increased risk for falls         Subjective    History of Present Illness  - Mechanism of injury: Patient presents to therapy with reports of unsteadiness and imbalance while on feet  She states that she noticed increase in staggering while walking starting ~5 years ago and has since then had a fear of falling  Patient states her fear of falling keep her from performing functional activities such as negotiating stairs without upper extremity support  She has since made modifications to her life and participation to account for her intolerance to activities such as prolonged standing, walking and stair negotiation  Update (2023): Patient reports that she feels stronger but remains apprehensive in regards to her balance      - Primary AD: none will use shopping cart in store  - Assist level at home:  helps with ADLs minimal use of stairs  - Decreased fine motor tasks: No      Pain  - Current pain ratin/10  - At best pain ratin/10  - At worst pain ratin/10  - Location: NA  - Aggravating factors: NA    Social Support  - Steps to enter house: small flight ~ 5 steps,w/ HR  - Stairs in house: 1 flight to upstairs, w/ HR  - Lives in: [facility] and [place]  - Lives with:     - Employment status: retired, [employer]  - Hand dominance: R    Treatments  - Previous treatment: PT  - Current treatment: medication  - Diagnostic Testing: MRI: negative       Objective     LE MMT  - R Hip Flexion: 4-/5  L Hip Flexion: 3+/5  - R Hip Extension: 3+/5 L Hip Extension: 3+/5  - R Hip Abduction: 4/5  L Hip Abduction: 4/5  - R Hip Adduction: 4/5  L Hip Adduction: 4/5  - R Knee Extension: 4-/5 L Knee Extension: 4-/5  - R Knee Flexion: 4-/5  L Knee Flexion: 4-/5  - R Ankle DF: 3+/5  L Ankle DF: 4-/5  - R Ankle PF: 4/5  L Ankle PF: 4/5    Sensation  - Light touch: normal  - Deep pressure: normal      Coordination  - Heel to [place]: R side discoordinated  - Alternate Toe Taps: normal  - Finger to Nose: normal  - Finger to Finger: normal    Reflexes/Clonus  - Clonus: No,   - Patellar DTR: 2+: Normal    Myelopathy Screen (>3/5 +)  - Paul's Reflex: -  - Babinski Reflex: not asssed  - Inverted Supinator Sign: -  - Age > 45: Yes  - Gait Deviation: No    Gait  - Abnormalities: Increased trunk sway, decreased stance time on L, decreased foot clearance, increased L hip hike           Outcome Measures Initial Eval  5-1-23 PN  6-14-23       5xSTS 12 30 sec 11 47 sec,   0 UE       TUG  - Regular  - Cognitive   15 98 sec  19 36 sec   10 06 sec  11 74 sec       10 meter 0 97 m/s 1 18 m/s       FGA 17/30 20/30       DGI 15/24 19/24       mCTSIB  - FTEO (firm)  - FTEC (firm)  - FTEO (foam)  - FTEC (foam)   30 sec  30 sec (+)  10 50 sec  3 72 sec   30 sec  30 sec (+)  30 sec  3 sec       6MWT 1040 ft                                     Precautions: Macular Degeneration  Past Medical History:   Diagnosis Date   • Anxiety disorder    • Arthritis    • Bright red rectal bleeding    • Colon polyp    • DJD (degenerative joint disease)    • High cholesterol    • Hyperactivity of bladder    • Hypertension    • Macular degeneration    • Obesity    • Osteoporosis

## 2023-06-16 ENCOUNTER — RA CDI HCC (OUTPATIENT)
Dept: OTHER | Facility: HOSPITAL | Age: 78
End: 2023-06-16

## 2023-06-19 ENCOUNTER — OFFICE VISIT (OUTPATIENT)
Dept: PHYSICAL THERAPY | Facility: CLINIC | Age: 78
End: 2023-06-19
Payer: MEDICARE

## 2023-06-19 DIAGNOSIS — R26.89 IMBALANCE: ICD-10-CM

## 2023-06-19 DIAGNOSIS — R26.81 UNSTEADY GAIT: Primary | ICD-10-CM

## 2023-06-19 PROCEDURE — 97530 THERAPEUTIC ACTIVITIES: CPT

## 2023-06-19 NOTE — PROGRESS NOTES
Daily Note   POC expires Auth Status Total   Visits  Start date  Expiration date PT/OT + Visit Limit? Co-Insurance   23 N/A N/A N/A N/A PT, No Yes and $0 Co-pay                                           Visit/Unit Tracking  AUTH Status: N/A Date               Visits  Authed: N/A Used                Remaining                      Today's date: 2023  Patient name: Aisha Parekh  : 1945  MRN: [id number]  Referring provider: Leonard Oro MD  Dx:   Encounter Diagnosis     ICD-10-CM    1  Unsteady gait  R26 81       2  Imbalance  R26 89                      Subjective: Patient reports to PT session denies falls or pain  Objective: See treatment diary below  PT verbal and tactile cues for technique and progression  NMR:  - Sit to stands to fatigue: 3 sets,12  reps each set  - Sidestepping on foam beam: 3 laps, 10 ft down/back, 0 UE wall in front, 3 LOB  - Biodex   - LoS easy: hard, no level set, 32%   - LoS med: hard, no level set, 10%   - LoS med: medium, no level set, 22%   - Maze: medium, no level set, -142%   - Maze: medium, no level set, 22%   - Maze: medium, no level set, 23%  - Step ups to river rocks (Medium) x 20 reps  - A-P WS (foam, EO) x 30 reps  -A-P sway EC firm x 30 reps- 5 LOB  - X1 view x 60 seconds H/V each, 2 LOB    Assessment: Patient able to tolerate treatment session well today with continued focus on weight shifting  Able to progress all exercise difficulty today without complaint  Apprehensive with EC weight shift requiring PT encouragement/ verbal and tactile cues  Visually dependent for balance  She will continue to benefit from skilled outpatient PT in order to maximize her function and balance in order to enable her to reach her maximal level of potential       Plan: Continue per plan of care            Outcome Measures Initial Eval  23        5xSTS 12 30 sec        TUG  - Regular  - Cognitive   15 98 sec  19 36 sec        10 meter 0 97 m/s                 FGA 17/30        DGI 15/24        mCTSIB  - FTEO (firm)  - FTEC (firm)  - FTEO (foam)  - FTEC (foam)   30 sec  30 sec (+)  10 50 sec  3 72 sec        6MWT 1040 ft                               3

## 2023-06-21 ENCOUNTER — OFFICE VISIT (OUTPATIENT)
Dept: PHYSICAL THERAPY | Facility: CLINIC | Age: 78
End: 2023-06-21
Payer: MEDICARE

## 2023-06-21 DIAGNOSIS — R26.81 UNSTEADY GAIT: Primary | ICD-10-CM

## 2023-06-21 DIAGNOSIS — R26.89 IMBALANCE: ICD-10-CM

## 2023-06-21 PROCEDURE — 97112 NEUROMUSCULAR REEDUCATION: CPT

## 2023-06-21 NOTE — PROGRESS NOTES
Daily Note   POC expires Auth Status Total   Visits  Start date  Expiration date PT/OT + Visit Limit? Co-Insurance   23 N/A N/A N/A N/A PT, No Yes and $0 Co-pay                                           Visit/Unit Tracking  AUTH Status: N/A Date               Visits  Authed: N/A Used                Remaining                      Today's date: 2023  Patient name: Dax Delgado  : 1945  MRN: [id number]  Referring provider: Hillary Black MD  Dx:   Encounter Diagnosis     ICD-10-CM    1  Unsteady gait  R26 81       2  Imbalance  R26 89           Start Time: 1330  Stop Time: 1415  Total time in clinic (min): 45 minutes    Subjective: Patient reports to PT session denies falls or pain  Objective: See treatment diary below  PT verbal and tactile cues for technique and progression  NMR:  - Sit to stands to fatigue: 2 sets,14  reps each set  - Sidestepping on foam beam: 3 laps, 10 ft down/back near Pbars, 0 UE wall in front, pt tried 1 rep then refuses  - Biodex   - LoS easy: hard, no level set, 56%   - LoS med: hard, no level set, 16%   - LoS med: medium, no level set, 41%   - Maze: medium, no level set, 16%   - Maze: medium, no level set, 18%   - Maze: medium, no level set, 14%  - Step ups to river rocks (Medium) x 10 reps each lead leg (20 total)   - A-P WS (foam, EO) x 30 reps  -A-P sway EC firm x 30 reps- 5 LOB  - X1 view x 60 seconds H/V each, 2 LOB    Assessment: Patient able to tolerate treatment session well today with continued focus on weight shifting  Pt instructed to stay within her LOS for sway, for now, otherwise she gets frustrated with LOB  Pt challenged with sidestepping on foam beam facing wall, and deferred further repetitions  Apprehensive with EC weight shift requiring PT encouragement/ verbal and tactile cues  Visually dependent for balance   She will continue to benefit from skilled outpatient PT in order to maximize her function and balance in order to enable her to reach her maximal level of potential       Plan: Continue per plan of care            Outcome Measures Initial Eval  5-1-23        5xSTS 12 30 sec        TUG  - Regular  - Cognitive   15 98 sec  19 36 sec        10 meter 0 97 m/s                 FGA 17/30        DGI 15/24        mCTSIB  - FTEO (firm)  - FTEC (firm)  - FTEO (foam)  - FTEC (foam)   30 sec  30 sec (+)  10 50 sec  3 72 sec        6MWT 1040 ft                               3

## 2023-06-23 ENCOUNTER — OFFICE VISIT (OUTPATIENT)
Dept: FAMILY MEDICINE CLINIC | Facility: CLINIC | Age: 78
End: 2023-06-23
Payer: MEDICARE

## 2023-06-23 VITALS
SYSTOLIC BLOOD PRESSURE: 124 MMHG | BODY MASS INDEX: 35.15 KG/M2 | HEIGHT: 62 IN | DIASTOLIC BLOOD PRESSURE: 74 MMHG | OXYGEN SATURATION: 98 % | HEART RATE: 80 BPM | WEIGHT: 191 LBS

## 2023-06-23 DIAGNOSIS — R26.81 UNSTEADY GAIT: ICD-10-CM

## 2023-06-23 DIAGNOSIS — K64.8 OTHER HEMORRHOIDS: ICD-10-CM

## 2023-06-23 DIAGNOSIS — E66.09 CLASS 1 OBESITY DUE TO EXCESS CALORIES WITH SERIOUS COMORBIDITY AND BODY MASS INDEX (BMI) OF 34.0 TO 34.9 IN ADULT: ICD-10-CM

## 2023-06-23 DIAGNOSIS — F41.9 ANXIETY AND DEPRESSION: Primary | ICD-10-CM

## 2023-06-23 DIAGNOSIS — E78.5 DYSLIPIDEMIA: ICD-10-CM

## 2023-06-23 DIAGNOSIS — M15.9 PRIMARY OSTEOARTHRITIS INVOLVING MULTIPLE JOINTS: ICD-10-CM

## 2023-06-23 DIAGNOSIS — R35.0 URINARY FREQUENCY: ICD-10-CM

## 2023-06-23 DIAGNOSIS — I10 PRIMARY HYPERTENSION: ICD-10-CM

## 2023-06-23 DIAGNOSIS — F32.A ANXIETY AND DEPRESSION: Primary | ICD-10-CM

## 2023-06-23 PROBLEM — J02.8 ACUTE PHARYNGITIS DUE TO OTHER SPECIFIED ORGANISMS: Status: RESOLVED | Noted: 2023-05-09 | Resolved: 2023-06-23

## 2023-06-23 PROCEDURE — 99214 OFFICE O/P EST MOD 30 MIN: CPT | Performed by: INTERNAL MEDICINE

## 2023-06-23 RX ORDER — ALPRAZOLAM 0.25 MG/1
0.25 TABLET ORAL 2 TIMES DAILY PRN
Qty: 40 TABLET | Refills: 0 | Status: SHIPPED | OUTPATIENT
Start: 2023-06-23

## 2023-06-23 RX ORDER — ESCITALOPRAM OXALATE 10 MG/1
10 TABLET ORAL DAILY
Qty: 30 TABLET | Refills: 5 | Status: SHIPPED | OUTPATIENT
Start: 2023-06-23 | End: 2023-12-20

## 2023-06-23 NOTE — ASSESSMENT & PLAN NOTE
Procedure was postponed by the urogynecologist all other options were discussed with her including medications, Botox, stimulator    She needs a follow-up appointment with her urogynecologist again

## 2023-06-23 NOTE — PROGRESS NOTES
Office Visit Note  23     Trung Oneill 69 y o  female MRN: [id number]  : 1945    Assessment:     1  Anxiety and depression  Assessment & Plan:  Patient has been under a lot of stress in the recent past with anxiety and depression we will give her a few tablets of Xanax to be taken as needed anxiety and we will start her on Lexapro 10 mg daily which she has taken in the past    Orders:  -     ALPRAZolam (XANAX) 0 25 mg tablet; Take 1 tablet (0 25 mg total) by mouth 2 (two) times a day as needed for anxiety  -     escitalopram (LEXAPRO) 10 mg tablet; Take 1 tablet (10 mg total) by mouth daily    2  Urinary frequency  Assessment & Plan:  Procedure was postponed by the urogynecologist all other options were discussed with her including medications, Botox, stimulator  She needs a follow-up appointment with her urogynecologist again      3  Unsteady gait  Assessment & Plan:  [facility]  We will also get evaluation by the neurologist MRI showing microangiopathic changes white matter    Orders:  -     Ambulatory referral to Neurology; Future    4  Primary osteoarthritis involving multiple joints  Assessment & Plan:  Continue meloxicam as needed      5  Primary hypertension  Assessment & Plan:  Blood pressure today is 124/74 continue telmisartan 40 mg daily      6  Dyslipidemia  Assessment & Plan:  Patient with hypercholesterolemia could not tolerate statins again recommended strict diet exercise losing weight      7  Class 1 obesity due to excess calories with serious comorbidity and body mass index (BMI) of 34 0 to 34 9 in adult  Assessment & Plan:  BMI is slightly less compared to the last time at 34 93 discussed regarding lifestyle modification exercise cutting back calorie intake  8  Other hemorrhoids  Assessment & Plan:  Patient's hemorrhoids have been bothering her we will prescribe Anusol HC suppositories               Discussion Summary and Plan:   Today's care plan and medications were reviewed with patient in detail and all their questions answered to their satisfaction  Chief Complaint   Patient presents with   • Follow-up      Subjective:  Patient is coming here for evaluation regarding symptoms of anxiety and depression she has been under a lot of stress in the recent past   He also still has problems with overactive bladder seen by the urogynecologist   Her gait has been unsteady we had an MRI of the brain done which showed some microangiopathic changes  Medications reviewed labs reviewed  Patient also complains of hemorrhoids Preparation H has been not helping much  She was supposed to go for the colonoscopy also  The following portions of the patient's history were reviewed and updated as appropriate: allergies, current medications, past family history, past medical history, past social history, past surgical history and problem list     Review of Systems   Constitutional: Negative for chills and fever  HENT: Negative for ear pain and sore throat  Eyes: Negative for pain and visual disturbance  Respiratory: Negative for cough and shortness of breath  Cardiovascular: Negative for chest pain and palpitations  Gastrointestinal: Negative for abdominal pain and vomiting  Genitourinary: Negative for dysuria and hematuria  Musculoskeletal: Negative for arthralgias and back pain  Skin: Negative for color change and rash  Neurological: Negative for seizures and syncope  Psychiatric/Behavioral: The patient is nervous/anxious  All other systems reviewed and are negative          Historical Information   Patient Active Problem List   Diagnosis   • Primary hypertension   • Class 1 obesity due to excess calories in adult   • Dyslipidemia   • Primary osteoarthritis involving multiple joints   • Urinary frequency   • Muscle cramps   • Unsteady gait   • Anxiety and depression   • Other hemorrhoids     Past Medical History:   Diagnosis Date • Anxiety disorder    • Arthritis    • Bright red rectal bleeding    • Colon polyp    • DJD (degenerative joint disease)    • High cholesterol    • Hyperactivity of bladder    • Hypertension    • Macular degeneration    • Obesity    • Osteoporosis      Past Surgical History:   Procedure Laterality Date   • CHOLECYSTECTOMY     • COLONOSCOPY     • DXA PROCEDURE (HISTORICAL)  06/23/2021   • INTRAOCULAR LENS INSERTION     • MAMMO (HISTORICAL)  01/04/2022   • TOTAL SHOULDER REPLACEMENT Left      Social History     Substance and Sexual Activity   Alcohol Use No     Social History     Substance and Sexual Activity   Drug Use No     Social History     Tobacco Use   Smoking Status Former   • Types: Cigarettes   • Passive exposure: Past   Smokeless Tobacco Never   Tobacco Comments    quit cigarettes age 33     Family History   Problem Relation Age of Onset   • No Known Problems Mother    • Esophageal cancer Father    • No Known Problems Sister    • No Known Problems Sister    • Esophageal cancer Maternal Grandmother    • No Known Problems Paternal Grandmother    • Breast cancer Maternal Aunt 79   • No Known Problems Maternal Aunt    • No Known Problems Maternal Aunt    • No Known Problems Maternal Aunt    • Skin cancer Paternal Aunt    • No Known Problems Paternal Aunt    • No Known Problems Paternal Aunt    • Cancer Paternal Aunt    • Breast cancer Cousin 68     Health Maintenance Due   Topic   • Hepatitis C Screening    • Medicare Annual Wellness Visit (AWV)    • Pneumococcal Vaccine: 65+ Years (1 - PCV)   • COVID-19 Vaccine (3 - Pfizer series)   • PT PLAN OF CARE    • Colorectal Cancer Screening       Meds/Allergies       Current Outpatient Medications:   •  ALPRAZolam (XANAX) 0 25 mg tablet, Take 1 tablet (0 25 mg total) by mouth 2 (two) times a day as needed for anxiety, Disp: 40 tablet, Rfl: 0  •  Biotin 1000 MCG tablet, Take 1,000 mcg by mouth 3 (three) times a day, Disp: , Rfl:   •  Calcium Carb-Cholecalciferol "1000-800 MG-UNIT TABS, Calcium TABS  Refills: 0  Active, Disp: , Rfl:   •  cholecalciferol (VITAMIN D3) 1,000 units tablet, Vitamin D TABS  Refills: 0  Active, Disp: , Rfl:   •  Cinnamon 500 MG capsule, Cinnamon CAPS  Refills: 0  Active, Disp: , Rfl:   •  Cranberry 200 MG CAPS, Take by mouth in the morning, Disp: , Rfl:   •  docusate sodium (COLACE) 50 mg capsule, Take by mouth, Disp: , Rfl:   •  escitalopram (LEXAPRO) 10 mg tablet, Take 1 tablet (10 mg total) by mouth daily, Disp: 30 tablet, Rfl: 5  •  Flaxseed, Linseed, (FLAX SEED OIL) 1000 MG CAPS, Flax Seed Oil 1000 MG Oral Capsule  Refills: 0  Active, Disp: , Rfl:   •  fluticasone (FLONASE) 50 mcg/act nasal spray, 2 sprays into each nostril daily, Disp: 11 1 mL, Rfl: 1  •  Multiple Vitamins-Minerals (PRESERVISION AREDS 2 PO), Take by mouth, Disp: , Rfl:   •  Omega-3 Fatty Acids (FISH OIL) 645 MG CAPS, Fish Oil CAPS  Refills: 0  Active, Disp: , Rfl:   •  Potassium 95 MG TABS, Potassium TABS  Refills: 0  Active, Disp: , Rfl:   •  telmisartan (MICARDIS) 40 mg tablet, Take 1 tablet (40 mg total) by mouth daily, Disp: 90 tablet, Rfl: 1      Objective:    Vitals:   /74 (BP Location: Right arm, Patient Position: Sitting, Cuff Size: Large)   Pulse 80   Ht 5' 2\" (1 575 m)   Wt 86 6 kg (191 lb)   SpO2 98%   BMI 34 93 kg/m²   Body mass index is 34 93 kg/m²  Vitals:    06/23/23 1123   Weight: 86 6 kg (191 lb)       Physical Exam  Vitals and nursing note reviewed  Constitutional:       Appearance: Normal appearance  She is obese  Cardiovascular:      Rate and Rhythm: Normal rate and regular rhythm  Heart sounds: Normal heart sounds  Pulmonary:      Effort: Pulmonary effort is normal       Breath sounds: Normal breath sounds  Musculoskeletal:      Cervical back: Normal range of motion and neck supple  Right lower leg: No edema  Left lower leg: No edema  Skin:     General: Skin is warm and dry     Neurological:      Mental Status: She is " "alert and oriented to person, place, and time  Lab Review   No visits with results within 2 Month(s) from this visit  Latest known visit with results is:   Orders Only on 01/06/2023   Component Date Value Ref Range Status   • Specific Gravity 01/06/2023 1 018  1 005 - 1 030 Final   • Ph 01/06/2023 7 5  5 0 - 7 5 Final   • Color UA 01/06/2023 Yellow  Yellow Final   • Urine Appearance 01/06/2023 Clear  Clear Final   • Leukocyte Esterase 01/06/2023 Negative  Negative Final   • Protein 01/06/2023 Negative  Negative/Trace Final   • Glucose, 24 HR Urine 01/06/2023 Negative  Negative Final   • Ketone, Urine 01/06/2023 Negative  Negative Final   • Blood, Urine 01/06/2023 Negative  Negative Final   • Bilirubin, Urine 01/06/2023 Negative  Negative Final   • Urobilinogen Urine 01/06/2023 0 2  0 2 - 1 0 mg/dL Final   • SL AMB NITRITES URINE, QUAL  01/06/2023 Negative  Negative Final   • Microscopic Examination 01/06/2023 Comment   Final    Microscopic follows if indicated  • Microscopic Examination 01/06/2023 See below:   Final    Microscopic was indicated and was performed  • Urinalysis Reflex 01/06/2023 Comment   Final    This specimen will not reflex to a Urine Culture  • SL AMB WBC, URINE 01/06/2023 None seen  0 - 5 /hpf Final   • RBC, Urine 01/06/2023 0-2  0 - 2 /hpf Final   • Epithelial Cells (non renal) 01/06/2023 None seen  0 - 10 /hpf Final   • Casts 01/06/2023 None seen  None seen /lpf Final   • Bacteria, Urine 01/06/2023 None seen  None seen/Few Final         Miranda Guadalupe MD        \"This note has been constructed using a voice recognition system  Therefore there may be syntax, spelling, and/or grammatical errors  Please call if you have any questions   \"  "

## 2023-06-23 NOTE — ASSESSMENT & PLAN NOTE
Patient with hypercholesterolemia could not tolerate statins again recommended strict diet exercise losing weight

## 2023-06-23 NOTE — ASSESSMENT & PLAN NOTE
[facility]    We will also get evaluation by the neurologist MRI showing microangiopathic changes white matter

## 2023-06-23 NOTE — ASSESSMENT & PLAN NOTE
BMI is slightly less compared to the last time at 34 93 discussed regarding lifestyle modification exercise cutting back calorie intake

## 2023-06-23 NOTE — ASSESSMENT & PLAN NOTE
Patient has been under a lot of stress in the recent past with anxiety and depression we will give her a few tablets of Xanax to be taken as needed anxiety and we will start her on Lexapro 10 mg daily which she has taken in the past

## 2023-06-26 ENCOUNTER — APPOINTMENT (OUTPATIENT)
Dept: PHYSICAL THERAPY | Facility: CLINIC | Age: 78
End: 2023-06-26
Payer: MEDICARE

## 2023-06-28 ENCOUNTER — OFFICE VISIT (OUTPATIENT)
Dept: PHYSICAL THERAPY | Facility: CLINIC | Age: 78
End: 2023-06-28
Payer: MEDICARE

## 2023-06-28 DIAGNOSIS — R26.81 UNSTEADY GAIT: Primary | ICD-10-CM

## 2023-06-28 DIAGNOSIS — R26.89 IMBALANCE: ICD-10-CM

## 2023-06-28 PROCEDURE — 97112 NEUROMUSCULAR REEDUCATION: CPT | Performed by: PHYSICAL THERAPIST

## 2023-06-28 NOTE — PROGRESS NOTES
Daily Note   POC expires Auth Status Total   Visits  Start date  Expiration date PT/OT + Visit Limit? Co-Insurance   23 N/A N/A N/A N/A PT, No Yes and $0 Co-pay                                           Visit/Unit Tracking  AUTH Status: N/A Date               Visits  Authed: N/A Used                Remaining                      Today's date: 2023  Patient name: Christine Saab  : 1945  MRN: [id number]  Referring provider: Maria Guadalupe Boss MD  Dx:   Encounter Diagnosis     ICD-10-CM    1  Unsteady gait  R26 81       2  Imbalance  R26 89                      Subjective: Patient reports no new changes, complaints, or falls  Objective: See treatment diary below  PT verbal and tactile cues for technique and progression  NMR:  - Sit to stands to fatigue: 2 sets, 20/10 reps  - Biodex   - LoS easy: no level set, 55%   - LoS med: no level set, 43%   - LoS hard: no level set, 20%   - LoS hard: no level set, 24%   - Maze hard:, no level set, 8%   - Maze hard:, no level set, 6%   - Sidestepping on foam beam: 3 laps, 5 ft down/back near // bars  - Staggered stance rolling 1 foam roller out/back: 3 sets, 10 reps B/L, 0 UE      Assessment: Patient able to tolerate treatment session well today with continued focus on weight shifting  Patient with significant fear of falling as displayed by increased hesitancy with all activities on compliant surfaces and in SLS  She required frequent UE support  She will continue to benefit from skilled outpatient PT in order to maximize her function and balance in order to enable her to reach her maximal level of potential       Plan: Continue per plan of care            Outcome Measures Initial Eval  23        5xSTS 12 30 sec        TUG  - Regular  - Cognitive   15 98 sec  19 36 sec        10 meter 0 97 m/s                 FGA         DGI 15/24        mCTSIB  - FTEO (firm)  - FTEC (firm)  - FTEO (foam)  - FTEC (foam)   30 sec  30 sec (+)  10 50 sec  3 72 sec 6MWT 1040 ft                               3

## 2023-07-03 ENCOUNTER — OFFICE VISIT (OUTPATIENT)
Dept: PHYSICAL THERAPY | Facility: CLINIC | Age: 78
End: 2023-07-03
Payer: MEDICARE

## 2023-07-03 DIAGNOSIS — R26.89 IMBALANCE: ICD-10-CM

## 2023-07-03 DIAGNOSIS — R26.81 UNSTEADY GAIT: Primary | ICD-10-CM

## 2023-07-03 PROCEDURE — 97112 NEUROMUSCULAR REEDUCATION: CPT | Performed by: PHYSICAL THERAPIST

## 2023-07-03 NOTE — PROGRESS NOTES
Daily Note   POC expires Auth Status Total   Visits  Start date  Expiration date PT/OT + Visit Limit? Co-Insurance   23 N/A N/A N/A N/A PT, No Yes and $0 Co-pay                                           Visit/Unit Tracking  AUTH Status: N/A Date               Visits  Authed: N/A Used                Remaining                      Today's date: 7/3/2023  Patient name: Julián Robert  : 1945  MRN: [id number]  Referring provider: Shannan Tate MD  Dx:   Encounter Diagnosis     ICD-10-CM    1. Unsteady gait  R26.81       2. Imbalance  R26.89                      Subjective: Patient reports no new changes, complaints, or falls. Objective: See treatment diary below. PT verbal and tactile cues for technique and progression. NMR:  - Sit to stands to fatigue: 2 sets, 20/15 reps  - Biodex   - LoS easy: no level set, 47%   - LoS easy: no level set, 46%   - LoS med: no level set, 57%   - LoS hard: no level set, 12%   - LoS hard: no level set, 13%   - Maze hard:, no level set, -19%   - Maze hard:, no level set, -26%     - Staggered stance rolling 1 foam roller out/back: 3 sets, 10 reps B/L, 0 UE  - Treadmill   - Bwd ambulation: 0.2 mph, 3 min, 0 UE      Assessment: Patient able to tolerate treatment session well today with continued focus on weight shifting. She required heavy education and motivation surrounding progress made up to this point for encouragement with fair improvements. She remains with significant hesitation and fear of falling for the duration of the session especially when walking backwards in which she frequently reached for UE support. She will continue to benefit from skilled outpatient PT in order to maximize her function and balance in order to enable her to reach her maximal level of potential.      Plan: Continue per plan of care.           Outcome Measures Initial Eval  23        5xSTS 12.30 sec        TUG  - Regular  - Cognitive   15.98 sec  19.36 sec        10 meter 0.97 m/s FGA 17/30        DGI 15/24        mCTSIB  - FTEO (firm)  - FTEC (firm)  - FTEO (foam)  - FTEC (foam)   30 sec  30 sec (+)  10.50 sec  3.72 sec        6MWT 1040 ft                               3

## 2023-07-05 ENCOUNTER — OFFICE VISIT (OUTPATIENT)
Dept: PHYSICAL THERAPY | Facility: CLINIC | Age: 78
End: 2023-07-05
Payer: MEDICARE

## 2023-07-05 DIAGNOSIS — R26.89 IMBALANCE: ICD-10-CM

## 2023-07-05 DIAGNOSIS — R26.81 UNSTEADY GAIT: Primary | ICD-10-CM

## 2023-07-05 PROCEDURE — 97112 NEUROMUSCULAR REEDUCATION: CPT | Performed by: PHYSICAL THERAPIST

## 2023-07-05 NOTE — PROGRESS NOTES
Daily Note   POC expires Auth Status Total   Visits  Start date  Expiration date PT/OT + Visit Limit? Co-Insurance   23 N/A N/A N/A N/A PT, No Yes and $0 Co-pay                                           Visit/Unit Tracking  AUTH Status: N/A Date               Visits  Authed: N/A Used                Remaining                      Today's date: 2023  Patient name: Maria Fernanda Merrill  : 1945  MRN: [id number]  Referring provider: Angelito Vyas MD  Dx:   Encounter Diagnosis     ICD-10-CM    1. Unsteady gait  R26.81       2. Imbalance  R26.89                      Subjective: Patient reports no new changes, complaints, or falls. Objective: See treatment diary below. PT verbal and tactile cues for technique and progression. NMR:  - Sit to stands to fatigue: 2 sets, 20/10 reps  - Biodex   - LoS hard: no level set, 23%   - LoS hard: no level set, 22%   - LoS hard: no level set, 23%   - Maze hard: no level set, -19%   - Maze hard: no level set, -23%   - Weight shifting (hard - H): no level set, 90%   - Weight shifting (hard - V): no level set, 61%   - Weight shifting (hard - D): no level set, 63%   - Weight shifting (hard - D): no level set, 74%  - FAEC on foam: 3 reps, 20 sec    TA:  - Patient education: reasoning behind exercise prescription and benefits of pelvic floor PT        Assessment: Patient able to tolerate treatment session well today with continued focus on weight shifting. Significant improvements in weight shifting and resultant proprioception per Biodex progression and results. Most difficulty with activities on compliant surfaces with eyes closed in which she continually reached for UE support indicating hesitancy and fear of falling. She will continue to benefit from skilled outpatient PT in order to maximize her function and balance in order to enable her to reach her maximal level of potential.      Plan: Continue per plan of care.           Outcome Measures Initial Eval  23 5xSTS 12.30 sec        TUG  - Regular  - Cognitive   15.98 sec  19.36 sec        10 meter 0.97 m/s                 A 17/30        DGI 15/24        mCTSIB  - FTEO (firm)  - FTEC (firm)  - FTEO (foam)  - FTEC (foam)   30 sec  30 sec (+)  10.50 sec  3.72 sec        6MWT 1040 ft                               3

## 2023-07-10 ENCOUNTER — OFFICE VISIT (OUTPATIENT)
Dept: PHYSICAL THERAPY | Facility: CLINIC | Age: 78
End: 2023-07-10
Payer: MEDICARE

## 2023-07-10 DIAGNOSIS — R26.89 IMBALANCE: ICD-10-CM

## 2023-07-10 DIAGNOSIS — R26.81 UNSTEADY GAIT: Primary | ICD-10-CM

## 2023-07-10 PROCEDURE — 97112 NEUROMUSCULAR REEDUCATION: CPT | Performed by: PHYSICAL THERAPIST

## 2023-07-10 NOTE — PROGRESS NOTES
Daily Note   POC expires Auth Status Total   Visits  Start date  Expiration date PT/OT + Visit Limit? Co-Insurance   23 N/A N/A N/A N/A PT, No Yes and $0 Co-pay                                           Visit/Unit Tracking  AUTH Status: N/A Date               Visits  Authed: N/A Used                Remaining                      Today's date: 7/10/2023  Patient name: Mindy Maravilla  : 1945  MRN: [id number]  Referring provider: Khoi Perry MD  Dx:   Encounter Diagnosis     ICD-10-CM    1. Unsteady gait  R26.81       2. Imbalance  R26.89                      Subjective: Patient reports no new changes, complaints, or falls. Patient stated she is not ready to meet the clinic's pelvic PT yet. Objective: See treatment diary below. PT verbal and tactile cues for technique and progression. NMR:  - Sit to stands to fatigue: 2 sets, 13/10 reps  - Biodex   - Maze hard: no level set, -31%   - Maze hard: no level set, 2%   - Maze hard: no level set, -20%   - LoS hard: no level set, 19%   - LoS hard: no level set, 28%   - LoS hard: no level set, 52%  - FAEC on foam: 4 reps, 20-30 sec  - Staggered stance rolling 1 foam roller out/back: 1 set, 30 reps B/L, 0 UE  - Treadmill   - Bwd ambulation: 0.2 mph, 4 min, 0 UE        Assessment: Patient able to tolerate treatment session well today with continued focus on weight shifting. Great improvements in weight shifting on biodex, however carryover to other activities is poor in which she required heavy verbal cues to utilize with fair performance. Overall improvements in her balance and stability today with no true LoB and remained with frequent reaching for UE support due to fear of falling. She will continue to benefit from skilled outpatient PT in order to maximize her function and balance in order to enable her to reach her maximal level of potential.      Plan: Continue per plan of care.           Outcome Measures Initial Eval  23 PN  23       5xSTS 12.30 sec 11.47 sec,   0 UE       TUG  - Regular  - Cognitive   15.98 sec  19.36 sec   10.06 sec  11.74 sec       10 meter 0.97 m/s 1.18 m/s       FGA 17/30 20/30       DGI 15/24 19/24       mCTSIB  - FTEO (firm)  - FTEC (firm)  - FTEO (foam)  - FTEC (foam)   30 sec  30 sec (+)  10.50 sec  3.72 sec   30 sec  30 sec (+)  30 sec  3 sec       6MWT 1040 ft                                3

## 2023-07-12 ENCOUNTER — OFFICE VISIT (OUTPATIENT)
Facility: CLINIC | Age: 78
End: 2023-07-12
Payer: MEDICARE

## 2023-07-12 DIAGNOSIS — R26.89 IMBALANCE: ICD-10-CM

## 2023-07-12 DIAGNOSIS — R26.81 UNSTEADY GAIT: Primary | ICD-10-CM

## 2023-07-12 PROCEDURE — 97112 NEUROMUSCULAR REEDUCATION: CPT | Performed by: PHYSICAL THERAPIST

## 2023-07-12 NOTE — PROGRESS NOTES
Daily Note   POC expires Auth Status Total   Visits  Start date  Expiration date PT/OT + Visit Limit? Co-Insurance   23 N/A N/A N/A N/A PT, No Yes and $0 Co-pay                                           Visit/Unit Tracking  AUTH Status: N/A Date               Visits  Authed: N/A Used                Remaining                      Today's date: 2023  Patient name: Mindy Maravilla  : 1945  MRN: [id number]  Referring provider: Khoi Perry MD  Dx:   Encounter Diagnosis     ICD-10-CM    1. Unsteady gait  R26.81       2. Imbalance  R26.89                      Subjective: Patient reports no new changes, complaints, or falls. Patient stated she is not ready to meet the clinic's pelvic PT yet. Objective: See treatment diary below. PT verbal and tactile cues for technique and progression. NMR:  - Sit to stands to fatigue: 2 sets, 15 reps  - STS to fatigue w/ EC: 1 set, 10 reps  - Biodex   - Maze hard: no level set, 23%   - Maze hard: no level set, 28%   - Maze hard: no level set, 25%   - LoS hard: no level set, 28%   - LoS hard: no level set, 25%   - LoS hard: no level set, 22%  - FAEC on foam: 4 reps, 20-30 sec  - Staggered stance rolling 1 foam roller out/back: 2 sets, 20 reps B/L, 0 UE  - Ambulation around clinic carrying PBall (blue): 300 ft w/ ramp      Assessment: Patient able to tolerate treatment session well today with continued focus on weight shifting. Great improvements in somatosensory awareness as indicated by no negative scores during Biodex training today! Patient with unexpected decline negotiation when carrying a large PBall resulting is significant festinating and eventual need for PT maxA to maintain her balance indicating remaining deficits in reactive balance. She will continue to benefit from skilled outpatient PT in order to maximize her function and balance in order to enable her to reach her maximal level of potential.      Plan: Continue per plan of care.           Outcome Measures Initial Eval  5-1-23 PN  6-14-23       5xSTS 12.30 sec 11.47 sec,   0 UE       TUG  - Regular  - Cognitive   15.98 sec  19.36 sec   10.06 sec  11.74 sec       10 meter 0.97 m/s 1.18 m/s       FGA 17/30 20/30       DGI 15/24 19/24       mCTSIB  - FTEO (firm)  - FTEC (firm)  - FTEO (foam)  - FTEC (foam)   30 sec  30 sec (+)  10.50 sec  3.72 sec   30 sec  30 sec (+)  30 sec  3 sec       6MWT 1040 ft                                3

## 2023-07-17 ENCOUNTER — EVALUATION (OUTPATIENT)
Facility: CLINIC | Age: 78
End: 2023-07-17
Payer: MEDICARE

## 2023-07-17 DIAGNOSIS — R26.81 UNSTEADY GAIT: Primary | ICD-10-CM

## 2023-07-17 DIAGNOSIS — R26.89 IMBALANCE: ICD-10-CM

## 2023-07-17 PROCEDURE — 97530 THERAPEUTIC ACTIVITIES: CPT | Performed by: PHYSICAL THERAPIST

## 2023-07-17 NOTE — PROGRESS NOTES
PT Re-Evaluation /Progress Note         POC expires Auth Status Total   Visits  Start date  Expiration date PT/OT + Visit Limit? Co-Insurance   23 N/A N/A N/A N/A PT, No Yes and $0 Co-pay                                           Visit/Unit Tracking  AUTH Status: N/A Date               Visits  Authed: N/A Used                Remaining                           Today's date: 2023  Patient name: Selene Sandhu  : 1945  MRN: [id number]  Referring provider: Sophia Bauer MD  Dx:   Encounter Diagnosis     ICD-10-CM    1. Unsteady gait  R26.81       2. Imbalance  R26.89             Assessment  Assessment details: Patient is a 69 y.o. Female who has been reporting to skilled outpatient PT with deficits in her LE strength, balance, and functional endurance impacting her ability to perform ADLs and ambulation safely. She demonstrated slight improvements in these areas as indicated by scores associated with TUG variations, DGI, FGA, 10 meter walk, and 6MWT indicating plateau in results. Difference between TUG and TUG cognitive as well as results of FGA depict the patient remains at HIGH risk for falls. She remains with most instability and fear of falling on compliant surfaces and with EC with difficulty performing balance strategies without PT intervention. Greatest apprehension and fear of falling resides in stair negotiation in which she is able to perform with 1-2 UE. She has met all but 1 long term goal at this time. Discussed with patient plan to review HEP with her next session and discharge with a follow up scheduled for 3 months from now and she was in good verbal agreement. Impairments: Abnormal gait, Activity intolerance, Impaired balance, Impaired physical strength, Poor posture and Weight-bearing intolerance  Understanding of Dx/Px/POC: Good  Prognosis: Good    Patient verbalized understanding of POC.          Please contact me if you have any questions or recommendations. Thank you for the referral and the opportunity to share in [facility].         Plan  Plan details: Strength training, endurance training, gait training, balance  Patient would benefit from: Skilled PT  Planned modality interventions: Cryotherapy  Planned therapy interventions: Balance, Balance/WB training, Body mechanics training, Functional ROM exercises, Gait training, Neuromuscular re-education, Patient education, Postural training, Strengthening, Stretching, Therapeutic activities, Therapeutic exercises, Therapeutic training, Transfer training and Activity modification  Frequency: 2x/wk  Duration in weeks: 12  Plan of Care beginning date: 5-1-23  Plan of Care expiration date: 12 weeks - 7-  Treatment plan discussed with: Patient       Goals  Short Term Goals (4 weeks):    - Patient will improve time on TUG by 2.9 seconds from 15.98 seconds to 13.08 seconds to facilitate improved safety in all ambulation - MET  - Patient will be independent in basic HEP 2-3 weeks - MET  - Patient will improve 5xSTS score by 2.3 seconds from 12.3 seconds to 10.0 seconds to promote improved LE functional strength needed for ADLs - MET  - Patient will complete components of HiMAT to promote agility necessary for sports related tasks - NT    Long Term Goals (12 weeks):  - Patient will be independent in a comprehensive home exercise program - In progress  - Patient will improve scoring on DGI by 2.6 points from 15/24 to 11.4/24 to progress safety - MET  - Patient will improve gait speed by 0.18 m/s from 0.97 m/s to 1.15 m/s to improve safety with community ambulation - MET  - Patient will improve scoring on FGA by 4 points from 17/30 to 21/30 to progress safety with dynamic tasks - MET  - Patient will be able to demonstrate HT in gait without veering - Partially Met  - Patient will improve 6 Minute Walk Test score by 190 feet from 1040 feet to 1230 feet to promote improved cardiovascular endurance - Not Met  - Patient will report 50% reduction in near falls in order to improve safety with functional tasks and reduce his risk for falls - MET  - Patient will report going on walks at least 3 days per week to promote independence and improved cardiovascular endurance - MET  - Patient will be able to ascend/descend stairs reciprocally with 1 UE assist to promote independence and safety with ADLs - MET  - Patient will report 50% reduction in near falls when ambulating on uneven terrain - MET      Cut off score    All date taken from [facility] Neuro Section or Rehab Measures      [place]/58  MDC: 6 pts  Age Norms:  57-79: M - 54   F - 55  70-79: M - 47   F - 53  80-89: M - 48   F - 50 5xSTS: Anastasiia et al 2010  MDC: 2.3 sec  Age Norms:  60-69: 11.1 sec  70-79: 12.6 sec  80-89: 14.8 sec   TUG  MDC: 4.14 sec  Cut off score:  >13.5 sec community dwelling adults  >32.2 frail elderly  <20 I for basic transfers  >30 dependent on transfers 10 Meter Walk Test: Elsi castillo 2011  MDC: 0.18 m/s  20-29: M - 1.35 m   F - 1.34 m  30-39: M - 1.43 m   F - 1.34 m  40-49: M - 1.43 m   F - 1.39 m  50-59: M - 1.43 m   F - 1.31 m  60-69: M - 1.34 m   F - 1.24 m  70-79: M - 1.26 m   F - 1.13 m  80-89: M - 0.97 m   F - 0.94 m    Household Ambulator < 0.4 m/s  Limited Community Ambulator 0.4 - 0.8 m/s  [employer] Ambulator 0.8 - 1.2 m/s  Safely cross the street > 1.2 m/s   FGA  MCID: 4 pts  Geriatrics/community < 22/30 fall risk  Geriatrics/community < 20/30 unexplained falls    DGI  MDC: vestibular - 4 pts  MDC: geriatric/community - 3 pts  Falls risk <19/24 mCTSIB  Norm: 20-60 yrs  Eyes open firm: norm sway 0.21-0.48  Eyes closed firm: norm sway 0.48-0.99  Eyes open foam: norm sway 0.38-0.71  Eyes closed foam: norm sway 0.70-2.22   6 Minute Walk Test  MDC: 190.98 ft  MCID: 164 ft    Age Norms  60-69: M - 1876 ft (571.80 m)  F - 1765 ft (537.98 m)  70-79: M - 1729 ft (527.00 m)  F - 1545 ft (470.92 m)  80-89: M - 1368 ft (416.97 m)  F - 1286 ft (391.97 m) ABC: [address], 2003  <67% increased risk for falls         Subjective    History of Present Illness  - Mechanism of injury: Patient presents to therapy with reports of unsteadiness and imbalance while on feet. She states that she noticed increase in staggering while walking starting ~5 years ago and has since then had a fear of falling. Patient states her fear of falling keep her from performing functional activities such as negotiating stairs without upper extremity support. She has since made modifications to her life and participation to account for her intolerance to activities such as prolonged standing, walking and stair negotiation. Update (2023): Patient reports that she feels stronger but remains apprehensive in regards to her balance. Update (2023): Patient reports that she feels she has been able to perform all ADLs around her house without much difficulty lately.     - Primary AD: none will use shopping cart in store  - Assist level at home:  helps with ADLs minimal use of stairs  - Decreased fine motor tasks: No      Pain  - Current pain ratin/10  - At best pain ratin/10  - At worst pain ratin/10  - Location: NA  - Aggravating factors: NA    Social Support  - Steps to enter house: small flight ~ 5 steps,w/ HR  - Stairs in house: 1 flight to upstairs, w/ HR  - Lives in: multivele-house  - Lives with:     - Employment status: retired,  cook  - Hand dominance: R    Treatments  - Previous treatment: PT  - Current treatment: medication  - Diagnostic Testing: MRI: negative       Objective     LE MMT  - R Hip Flexion: 4-/5  L Hip Flexion: 3+/5  - R Hip Extension: 3+/5 L Hip Extension: 3+/5  - R Hip Abduction: 4/5  L Hip Abduction: 4/5  - R Hip Adduction: 4/5  L Hip Adduction: 4/5  - R Knee Extension: 4-/5 L Knee Extension: 4-/5  - R Knee Flexion: 4-/5  L Knee Flexion: 4-/5  - R Ankle DF: 3+/5  L Ankle DF: 4-/5  - R Ankle PF: 4/5  L Ankle PF: 4/5    Sensation  - Light touch: normal  - Deep pressure: normal      Coordination  - Heel to [place]: R side discoordinated  - Alternate Toe Taps: normal  - Finger to Nose: normal  - Finger to Finger: normal    Reflexes/Clonus  - Clonus: No,   - Patellar DTR: 2+: Normal    Myelopathy Screen (>3/5 +)  - Paul's Reflex: -  - Babinski Reflex: not asssed  - Inverted Supinator Sign: -  - Age > 45: Yes  - Gait Deviation: No    Gait  - Abnormalities: Increased trunk sway, decreased stance time on L, decreased foot clearance, increased L hip hike           Outcome Measures Initial Eval  5-1-23 PN  6-14-23 PN  7-17-23      5xSTS 12.30 sec 11.47 sec,   0 UE 10.59 sec, 0 UE      TUG  - Regular  - Cognitive   15.98 sec  19.36 sec   10.06 sec  11.74 sec   9.69 sec  13.38 sec      10 meter 0.97 m/s 1.18 m/s 1.15 m/s      FGA 17/30 20/30 23/30      DGI 15/24 19/24 20/24      mCTSIB  - FTEO (firm)  - FTEC (firm)  - FTEO (foam)  - FTEC (foam)   30 sec  30 sec (+)  10.50 sec  3.72 sec   30 sec  30 sec (+)  30 sec  3 sec   30 sec  30 sec (+)  30 sec  3 sec      6MWT 1040 ft  1125 ft                                   Precautions: Macular Degeneration  Past Medical History:   Diagnosis Date   • Anxiety disorder    • Arthritis    • Bright red rectal bleeding    • Colon polyp    • DJD (degenerative joint disease)    • High cholesterol    • Hyperactivity of bladder    • Hypertension    • Macular degeneration    • Obesity    • Osteoporosis

## 2023-07-19 ENCOUNTER — OFFICE VISIT (OUTPATIENT)
Facility: CLINIC | Age: 78
End: 2023-07-19
Payer: MEDICARE

## 2023-07-19 DIAGNOSIS — R26.89 IMBALANCE: ICD-10-CM

## 2023-07-19 DIAGNOSIS — R26.81 UNSTEADY GAIT: Primary | ICD-10-CM

## 2023-07-19 PROCEDURE — 97112 NEUROMUSCULAR REEDUCATION: CPT | Performed by: PHYSICAL THERAPIST

## 2023-07-19 PROCEDURE — 97530 THERAPEUTIC ACTIVITIES: CPT | Performed by: PHYSICAL THERAPIST

## 2023-07-19 NOTE — PROGRESS NOTES
Discharge Note    POC expires Auth Status Total   Visits  Start date  Expiration date PT/OT + Visit Limit? Co-Insurance   23 N/A N/A N/A N/A PT, No Yes and $0 Co-pay                                           Visit/Unit Tracking  AUTH Status: N/A Date               Visits  Authed: N/A Used                Remaining                      Today's date: 2023  Patient name: Adrian Mak  : 1945  MRN: [id number]  Referring provider: Andrez Paris MD  Dx:   Encounter Diagnosis     ICD-10-CM    1. Unsteady gait  R26.81       2. Imbalance  R26.89                      Subjective: Patient reports no new changes, complaints, or falls and is ready to transition to a HEP. Objective: See treatment diary below. PT verbal and tactile cues for technique and progression. NMR:  - STS: 2 sets, 10 reps  - FAEC: 3 sets, 30 sec  - Modified tandem ambulation: 10 laps, 10 ft down/back, 0 UE (in hallway)  - Standing hip abduction: 2 sets, 10 reps, 3 sec holds, 1 UE  - Standing hip extension: 2 sets, 10 reps, 3 sec holds, 1 UE    TA: HEP Update (2023)  - STS: 2 sets, 10 reps  - FAEC: 3 sets, 30 sec (to be performed with stable surface in front and a chair behind)  - Modified tandem ambulation: 10 laps, 10 ft down/back, 0 UE (in hallway)  - Standing hip abduction: 2 sets, 10 reps, 3 sec holds, 1 UE  - Standing hip extension: 2 sets, 10 reps, 3 sec holds, 1 UE      Assessment: Patient able to tolerate session well today with focus on HEP. She displayed good proficiency and was in good verbal understanding to perform all activities near a support surface. Patient has met 85% of her short and long term goals at this time and is ready to be discharged from PT today with a planned return to follow up in 3 months. Plan: Continue per plan of care.       Goals  Short Term Goals (4 weeks):    - Patient will improve time on TUG by 2.9 seconds from 15.98 seconds to 13.08 seconds to facilitate improved safety in all ambulation - MET  - Patient will be independent in basic HEP 2-3 weeks - MET  - Patient will improve 5xSTS score by 2.3 seconds from 12.3 seconds to 10.0 seconds to promote improved LE functional strength needed for ADLs - MET  - Patient will complete components of HiMAT to promote agility necessary for sports related tasks - NT    Long Term Goals (12 weeks):  - Patient will be independent in a comprehensive home exercise program - MET  - Patient will improve scoring on DGI by 2.6 points from 15/24 to 11.4/24 to progress safety - MET  - Patient will improve gait speed by 0.18 m/s from 0.97 m/s to 1.15 m/s to improve safety with community ambulation - MET  - Patient will improve scoring on FGA by 4 points from 17/30 to 21/30 to progress safety with dynamic tasks - MET  - Patient will be able to demonstrate HT in gait without veering - Partially Met  - Patient will improve 6 Minute Walk Test score by 190 feet from 1040 feet to 1230 feet to promote improved cardiovascular endurance - Not Met  - Patient will report 50% reduction in near falls in order to improve safety with functional tasks and reduce his risk for falls - MET  - Patient will report going on walks at least 3 days per week to promote independence and improved cardiovascular endurance - MET  - Patient will be able to ascend/descend stairs reciprocally with 1 UE assist to promote independence and safety with ADLs - MET  - Patient will report 50% reduction in near falls when ambulating on uneven terrain - MET      Outcome Measures Initial Eval  5-1-23 PN  6-14-23 PN  7-17-23      5xSTS 12.30 sec 11.47 sec,   0 UE 10.59 sec, 0 UE      TUG  - Regular  - Cognitive   15.98 sec  19.36 sec   10.06 sec  11.74 sec   9.69 sec  13.38 sec      10 meter 0.97 m/s 1.18 m/s 1.15 m/s      FGA 17/30 20/30 23/30      DGI 15/24 19/24 20/24      mCTSIB  - FTEO (firm)  - FTEC (firm)  - FTEO (foam)  - FTEC (foam)   30 sec  30 sec (+)  10.50 sec  3.72 sec   30 sec  30 sec (+)  30 sec  3 sec   30 sec  30 sec (+)  30 sec  3 sec      6MWT 1040 ft  1125 ft

## 2023-07-25 ENCOUNTER — RA CDI HCC (OUTPATIENT)
Dept: OTHER | Facility: HOSPITAL | Age: 78
End: 2023-07-25

## 2023-07-25 NOTE — PROGRESS NOTES
f33.9  [address] [facility] coding opportunities          Chart Reviewed number of suggestions sent to Provider: 1     Patients Insurance     Medicare Insurance: [employer]

## 2023-08-01 ENCOUNTER — OFFICE VISIT (OUTPATIENT)
Dept: FAMILY MEDICINE CLINIC | Facility: CLINIC | Age: 78
End: 2023-08-01
Payer: MEDICARE

## 2023-08-01 VITALS
DIASTOLIC BLOOD PRESSURE: 78 MMHG | WEIGHT: 190.6 LBS | HEIGHT: 62 IN | OXYGEN SATURATION: 98 % | BODY MASS INDEX: 35.07 KG/M2 | SYSTOLIC BLOOD PRESSURE: 128 MMHG | HEART RATE: 68 BPM

## 2023-08-01 DIAGNOSIS — E55.9 VITAMIN D DEFICIENCY: ICD-10-CM

## 2023-08-01 DIAGNOSIS — F32.A ANXIETY AND DEPRESSION: ICD-10-CM

## 2023-08-01 DIAGNOSIS — E66.09 CLASS 1 OBESITY DUE TO EXCESS CALORIES WITH SERIOUS COMORBIDITY AND BODY MASS INDEX (BMI) OF 34.0 TO 34.9 IN ADULT: ICD-10-CM

## 2023-08-01 DIAGNOSIS — F41.9 ANXIETY AND DEPRESSION: ICD-10-CM

## 2023-08-01 DIAGNOSIS — F33.42 RECURRENT MAJOR DEPRESSIVE DISORDER, IN FULL REMISSION (HCC): ICD-10-CM

## 2023-08-01 DIAGNOSIS — R73.03 PRE-DIABETES: ICD-10-CM

## 2023-08-01 DIAGNOSIS — E78.5 DYSLIPIDEMIA: ICD-10-CM

## 2023-08-01 DIAGNOSIS — M15.9 PRIMARY OSTEOARTHRITIS INVOLVING MULTIPLE JOINTS: ICD-10-CM

## 2023-08-01 DIAGNOSIS — R35.0 URINARY FREQUENCY: ICD-10-CM

## 2023-08-01 DIAGNOSIS — R26.81 UNSTEADY GAIT: ICD-10-CM

## 2023-08-01 DIAGNOSIS — Z13.0 SCREENING FOR DEFICIENCY ANEMIA: ICD-10-CM

## 2023-08-01 DIAGNOSIS — Z13.29 SCREENING FOR THYROID DISORDER: ICD-10-CM

## 2023-08-01 DIAGNOSIS — I10 PRIMARY HYPERTENSION: Primary | ICD-10-CM

## 2023-08-01 PROCEDURE — 99213 OFFICE O/P EST LOW 20 MIN: CPT | Performed by: INTERNAL MEDICINE

## 2023-08-01 NOTE — PROGRESS NOTES
Office Visit Note  23     Rosaline Lazcano 69 y.o. female MRN: [id number]  : 1945    Assessment:     1. Primary hypertension  Assessment & Plan:  Blood pressure is stable 128/78 patient on telmisartan 40 mg we will continue      2. Recurrent major depressive disorder, in full remission [facility])  Assessment & Plan:  Patient is currently doing much better less anxious she did not want to start the Lexapro since she is feeling better we will monitor      3. Anxiety and depression  Assessment & Plan:  Patient is feeling better compared to before. She has not started yet the Lexapro. She takes occasionally Xanax. We will continue to monitor her without the Lexapro for now. 4. Class 1 obesity due to excess calories with serious comorbidity and body mass index (BMI) of 34.0 to 34.9 in adult  Assessment & Plan:  BMI is 34.86 slightly less compared to the last time emphasized again regarding the lifestyle modification cutting back on calorie intake carbohydrate intake and lose weight. 5. Dyslipidemia  Assessment & Plan:  Patient could not tolerate the statins recommend strict diet exercise lose weight repeat the labs at a later date. Orders:  -     Lipid panel; Future    6. Primary osteoarthritis involving multiple joints  Assessment & Plan:  Continue meloxicam      7. Unsteady gait  Assessment & Plan:  Patient referred to be seen by the neurologist.  She was also seen in the balance center with unsteady gait. 8. Urinary frequency  Assessment & Plan:  Recommend follow-up with the urogynecologist      9. Screening for thyroid disorder  -     TSH, 3rd generation with Free T4 reflex; Future; Expected date: 2023    10. Screening for deficiency anemia  -     CBC and differential; Future    11. Pre-diabetes  -     Hemoglobin A1C; Future; Expected date: 2023  -     Comprehensive metabolic panel; Future  -     UA w Reflex to Microscopic w Reflex to Culture;  Future; Expected date: 08/01/2023    12. Vitamin D deficiency  -     Vitamin D 25 hydroxy; Future             Discussion Summary and Plan: Today's care plan and medications were reviewed with patient in detail and all their questions answered to their satisfaction. Chief Complaint   Patient presents with   • Follow-up      Subjective:  Patient is coming here for a follow-up evaluation with regards to his symptoms of hypertension, patient also has been having unsteady gait was seen in the balance center and she is going to do some exercises at home also. Medications reviewed labs reviewed. Patient has an appointment with the gastroenterologist office on the fourth of this month and subsequently she will get the colonoscopy done. We will get her annual lab work done prior to the next visit. The following portions of the patient's history were reviewed and updated as appropriate: allergies, current medications, past family history, past medical history, past social history, past surgical history and problem list.    Review of Systems   Constitutional: Negative for chills and fever. HENT: Negative for ear pain and sore throat. Eyes: Negative for pain and visual disturbance. Respiratory: Negative for cough and shortness of breath. Cardiovascular: Negative for chest pain and palpitations. Gastrointestinal: Negative for abdominal pain and vomiting. Genitourinary: Negative for dysuria and hematuria. Musculoskeletal: Negative for arthralgias and back pain. Skin: Negative for color change and rash. Neurological: Negative for seizures and syncope. All other systems reviewed and are negative.         Historical Information   Patient Active Problem List   Diagnosis   • Primary hypertension   • Class 1 obesity due to excess calories in adult   • Dyslipidemia   • Primary osteoarthritis involving multiple joints   • Urinary frequency   • Muscle cramps   • Unsteady gait   • Anxiety and depression   • Other hemorrhoids   • Recurrent major depressive disorder, in full remission ([address] [facility])     Past Medical History:   Diagnosis Date   • Anxiety disorder    • Arthritis    • Bright red rectal bleeding    • Colon polyp    • DJD (degenerative joint disease)    • High cholesterol    • Hyperactivity of bladder    • Hypertension    • Macular degeneration    • Obesity    • Osteoporosis      Past Surgical History:   Procedure Laterality Date   • CHOLECYSTECTOMY     • COLONOSCOPY     • DXA PROCEDURE (HISTORICAL)  06/23/2021   • INTRAOCULAR LENS INSERTION     • MAMMO (HISTORICAL)  01/04/2022   • TOTAL SHOULDER REPLACEMENT Left      Social History     Substance and Sexual Activity   Alcohol Use No     Social History     Substance and Sexual Activity   Drug Use No     Social History     Tobacco Use   Smoking Status Former   • Types: Cigarettes   • Passive exposure: Past   Smokeless Tobacco Never   Tobacco Comments    quit cigarettes age 33     Family History   Problem Relation Age of Onset   • No Known Problems Mother    • Esophageal cancer Father    • No Known Problems Sister    • No Known Problems Sister    • Esophageal cancer Maternal Grandmother    • No Known Problems Paternal Grandmother    • Breast cancer Maternal Aunt 79   • No Known Problems Maternal Aunt    • No Known Problems Maternal Aunt    • No Known Problems Maternal Aunt    • Skin cancer Paternal Aunt    • No Known Problems Paternal Aunt    • No Known Problems Paternal Aunt    • Cancer Paternal Aunt    • Breast cancer Cousin 68     Health Maintenance Due   Topic   • Hepatitis C Screening    • Medicare Annual Wellness Visit (AWV)    • Pneumococcal Vaccine: 65+ Years (1 - PCV)   • Depression Remission PHQ    • COVID-19 Vaccine (3 - Pfizer series)   • Colorectal Cancer Screening    • PT PLAN OF CARE    • Influenza Vaccine (1)      Meds/Allergies       Current Outpatient Medications:   •  ALPRAZolam (XANAX) 0.25 mg tablet, Take 1 tablet (0.25 mg total) by mouth 2 (two) times a day as needed for anxiety, Disp: 40 tablet, Rfl: 0  •  Biotin 1000 MCG tablet, Take 1,000 mcg by mouth 3 (three) times a day, Disp: , Rfl:   •  Cranberry 200 MG CAPS, Take by mouth in the morning, Disp: , Rfl:   •  docusate sodium (COLACE) 50 mg capsule, Take by mouth, Disp: , Rfl:   •  escitalopram (LEXAPRO) 10 mg tablet, Take 1 tablet (10 mg total) by mouth daily, Disp: 30 tablet, Rfl: 5  •  Flaxseed, Linseed, (FLAX SEED OIL) 1000 MG CAPS, Flax Seed Oil 1000 MG Oral Capsule  Refills: 0  Active, Disp: , Rfl:   •  fluticasone (FLONASE) 50 mcg/act nasal spray, 2 sprays into each nostril daily, Disp: 11.1 mL, Rfl: 1  •  Multiple Vitamins-Minerals (PRESERVISION AREDS 2 PO), Take by mouth, Disp: , Rfl:   •  Omega-3 Fatty Acids (FISH OIL) 645 MG CAPS, Fish Oil CAPS  Refills: 0  Active, Disp: , Rfl:   •  Potassium 95 MG TABS, Potassium TABS  Refills: 0  Active, Disp: , Rfl:   •  telmisartan (MICARDIS) 40 mg tablet, Take 1 tablet (40 mg total) by mouth daily, Disp: 90 tablet, Rfl: 1  •  Calcium Carb-Cholecalciferol 1000-800 MG-UNIT TABS, Calcium TABS  Refills: 0  Active, Disp: , Rfl:   •  cholecalciferol (VITAMIN D3) 1,000 units tablet, Vitamin D TABS  Refills: 0  Active, Disp: , Rfl:   •  Cinnamon 500 MG capsule, Cinnamon CAPS  Refills: 0  Active, Disp: , Rfl:       Objective:    Vitals:   /78 (BP Location: Right arm, Patient Position: Sitting, Cuff Size: Large)   Pulse 68   Ht 5' 2" (1.575 m)   Wt 86.5 kg (190 lb 9.6 oz)   SpO2 98%   BMI 34.86 kg/m²   Body mass index is 34.86 kg/m². Vitals:    08/01/23 1557   Weight: 86.5 kg (190 lb 9.6 oz)       Physical Exam  Vitals and nursing note reviewed. Constitutional:       Appearance: Normal appearance. She is obese. HENT:      Head: Normocephalic and atraumatic. Right Ear: Tympanic membrane normal.      Left Ear: Tympanic membrane normal.      Mouth/Throat:      Mouth: Mucous membranes are moist.   Eyes:      Pupils: Pupils are equal, round, and reactive to light. Cardiovascular:      Rate and Rhythm: Normal rate and regular rhythm. Heart sounds: Normal heart sounds. Pulmonary:      Effort: Pulmonary effort is normal.      Breath sounds: Normal breath sounds. Abdominal:      General: Abdomen is flat. Palpations: Abdomen is soft. Musculoskeletal:      Cervical back: Normal range of motion and neck supple. Right lower leg: No edema. Left lower leg: No edema. Skin:     General: Skin is warm and dry. Neurological:      Mental Status: She is alert and oriented to person, place, and time. Lab Review   No visits with results within 2 Month(s) from this visit. Latest known visit with results is:   Orders Only on 01/06/2023   Component Date Value Ref Range Status   • Specific Gravity 01/06/2023 1.018  1.005 - 1.030 Final   • Ph 01/06/2023 7.5  5.0 - 7.5 Final   • Color UA 01/06/2023 Yellow  Yellow Final   • Urine Appearance 01/06/2023 Clear  Clear Final   • Leukocyte Esterase 01/06/2023 Negative  Negative Final   • Protein 01/06/2023 Negative  Negative/Trace Final   • Glucose, 24 HR Urine 01/06/2023 Negative  Negative Final   • Ketone, Urine 01/06/2023 Negative  Negative Final   • Blood, Urine 01/06/2023 Negative  Negative Final   • Bilirubin, Urine 01/06/2023 Negative  Negative Final   • Urobilinogen Urine 01/06/2023 0.2  0.2 - 1.0 mg/dL Final   • SL AMB NITRITES URINE, QUAL. 01/06/2023 Negative  Negative Final   • Microscopic Examination 01/06/2023 Comment   Final    Microscopic follows if indicated. • Microscopic Examination 01/06/2023 See below:   Final    Microscopic was indicated and was performed. • Urinalysis Reflex 01/06/2023 Comment   Final    This specimen will not reflex to a Urine Culture.    • SL AMB WBC, URINE 01/06/2023 None seen  0 - 5 /hpf Final   • RBC, Urine 01/06/2023 0-2  0 - 2 /hpf Final   • Epithelial Cells (non renal) 01/06/2023 None seen  0 - 10 /hpf Final   • Casts 01/06/2023 None seen  None seen /lpf Final   • Bacteria, Urine 01/06/2023 None seen  None seen/Few Final         Ernandez MD Rosa        "This note has been constructed using a voice recognition system. Therefore there may be syntax, spelling, and/or grammatical errors.  Please call if you have any questions. "

## 2023-08-02 NOTE — ASSESSMENT & PLAN NOTE
Patient referred to be seen by the neurologist.  She was also seen in the balance center with unsteady gait.

## 2023-08-02 NOTE — ASSESSMENT & PLAN NOTE
Patient is feeling better compared to before. She has not started yet the Lexapro. She takes occasionally Xanax. We will continue to monitor her without the Lexapro for now.

## 2023-08-02 NOTE — ASSESSMENT & PLAN NOTE
Patient is currently doing much better less anxious she did not want to start the Lexapro since she is feeling better we will monitor

## 2023-08-02 NOTE — ASSESSMENT & PLAN NOTE
Patient could not tolerate the statins recommend strict diet exercise lose weight repeat the labs at a later date.

## 2023-08-02 NOTE — ASSESSMENT & PLAN NOTE
BMI is 34.86 slightly less compared to the last time emphasized again regarding the lifestyle modification cutting back on calorie intake carbohydrate intake and lose weight.

## 2023-08-04 ENCOUNTER — TELEPHONE (OUTPATIENT)
Dept: GASTROENTEROLOGY | Facility: CLINIC | Age: 78
End: 2023-08-04

## 2023-08-04 ENCOUNTER — OFFICE VISIT (OUTPATIENT)
Dept: GASTROENTEROLOGY | Facility: CLINIC | Age: 78
End: 2023-08-04
Payer: MEDICARE

## 2023-08-04 VITALS
DIASTOLIC BLOOD PRESSURE: 89 MMHG | HEIGHT: 62 IN | HEART RATE: 76 BPM | SYSTOLIC BLOOD PRESSURE: 140 MMHG | WEIGHT: 190 LBS | BODY MASS INDEX: 34.96 KG/M2

## 2023-08-04 DIAGNOSIS — Z86.010 HISTORY OF COLON POLYPS: Primary | ICD-10-CM

## 2023-08-04 DIAGNOSIS — K59.00 CONSTIPATION, UNSPECIFIED CONSTIPATION TYPE: ICD-10-CM

## 2023-08-04 PROCEDURE — 99213 OFFICE O/P EST LOW 20 MIN: CPT | Performed by: INTERNAL MEDICINE

## 2023-08-04 RX ORDER — ASPIRIN 81 MG/1
81 TABLET ORAL DAILY
COMMUNITY

## 2023-08-04 NOTE — PROGRESS NOTES
[facility] - Outpatient Follow-up Note  Rishi Pan 69 y.o. female MRN: [id number]  Encounter: [id number]          ASSESSMENT AND PLAN:      1. History of colon polyps  14 polyps on colonoscopy 1 year ago majority of which were adenomatous    - Colonoscopy; Future    2. Constipation, unspecified constipation type  We will maximize dietary fiber intake to the use of an over-the-counter fiber supplement titrated to effect. Contingency might include a trial of Amitiza, Trulance, etc.    ______________________________________________________________________    SUBJECTIVE: Patient with a history of 14 polyps on most recent colonoscopy 1 year ago. Has alternating constipation and diarrhea without significant abdominal pain.       REVIEW OF SYSTEMS:    ROS       Historical Information   Past Medical History:   Diagnosis Date   • Anxiety disorder    • Arthritis    • Bright red rectal bleeding    • Colon polyp    • DJD (degenerative joint disease)    • Hernia A year ago   • High cholesterol    • Hyperactivity of bladder    • Hypertension    • Irritable bowel syndrome Last July   • Macular degeneration    • Obesity    • Osteoporosis      Past Surgical History:   Procedure Laterality Date   • CHOLECYSTECTOMY     • COLONOSCOPY     • DXA PROCEDURE (HISTORICAL)  06/23/2021   • INTRAOCULAR LENS INSERTION     • MAMMO (HISTORICAL)  01/04/2022   • TOTAL SHOULDER REPLACEMENT Left      Social History   Social History     Substance and Sexual Activity   Alcohol Use No     Social History     Substance and Sexual Activity   Drug Use Never     Social History     Tobacco Use   Smoking Status Former   • Packs/day: 0.00   • Years: 15.00   • Total pack years: 0.00   • Types: Cigarettes   • Passive exposure: Past   Smokeless Tobacco Never   Tobacco Comments    quit cigarettes age 33     Family History   Problem Relation Age of Onset   • Hearing loss Mother    • Esophageal cancer Father    • Hypertension Sister • No Known Problems Sister    • Esophageal cancer Maternal Grandmother    • Diabetes Paternal Grandmother    • Breast cancer Maternal Aunt    • No Known Problems Maternal Aunt    • No Known Problems Maternal Aunt    • No Known Problems Maternal Aunt    • Skin cancer Paternal Aunt    • No Known Problems Paternal Aunt    • No Known Problems Paternal Aunt    • Cancer Paternal Aunt    • Breast cancer Cousin        Meds/Allergies       Current Outpatient Medications:   •  ALPRAZolam (XANAX) 0.25 mg tablet  •  aspirin (ECOTRIN LOW STRENGTH) 81 mg EC tablet  •  Biotin 1000 MCG tablet  •  Calcium Carb-Cholecalciferol 1000-800 MG-UNIT TABS  •  cholecalciferol (VITAMIN D3) 1,000 units tablet  •  Cinnamon 500 MG capsule  •  Cranberry 200 MG CAPS  •  docusate sodium (COLACE) 50 mg capsule  •  escitalopram (LEXAPRO) 10 mg tablet  •  Flaxseed, Linseed, (FLAX SEED OIL) 1000 MG CAPS  •  fluticasone (FLONASE) 50 mcg/act nasal spray  •  Multiple Vitamins-Minerals (PRESERVISION AREDS 2 PO)  •  Omega-3 Fatty Acids (FISH OIL) 645 MG CAPS  •  Potassium 95 MG TABS  •  telmisartan (MICARDIS) 40 mg tablet    Allergies   Allergen Reactions   • Zithromax [Azithromycin] Swelling   • Atorvastatin Other (See Comments)     legs get stiff   • Erythromycin Base Palpitations           Objective     Blood pressure 140/89, pulse 76, height 5' 2" (1.575 m), weight 86.2 kg (190 lb). Body mass index is 34.75 kg/m². PHYSICAL EXAM:      Physical Exam  Vitals and nursing note reviewed. Constitutional:       General: She is not in acute distress. Appearance: She is not ill-appearing. HENT:      Head: Normocephalic and atraumatic. Eyes:      General: No scleral icterus. Extraocular Movements: Extraocular movements intact. Cardiovascular:      Rate and Rhythm: Normal rate. Pulmonary:      Effort: Pulmonary effort is normal. No respiratory distress. Abdominal:      General: There is no distension.    Skin:     Coloration: Skin is not cyanotic. Findings: No erythema. Neurological:      General: No focal deficit present. Mental Status: She is alert and oriented to person, place, and time. Psychiatric:         Mood and Affect: Mood normal.         Behavior: Behavior normal.          Lab Results:   No visits with results within 1 Day(s) from this visit. Latest known visit with results is:   Orders Only on 01/06/2023   Component Date Value   • Specific Gravity 01/06/2023 1.018    • Ph 01/06/2023 7.5    • Color UA 01/06/2023 Yellow    • Urine Appearance 01/06/2023 Clear    • Leukocyte Esterase 01/06/2023 Negative    • Protein 01/06/2023 Negative    • Glucose, 24 HR Urine 01/06/2023 Negative    • Ketone, Urine 01/06/2023 Negative    • Blood, Urine 01/06/2023 Negative    • Bilirubin, Urine 01/06/2023 Negative    • Urobilinogen Urine 01/06/2023 0.2    • SL AMB NITRITES URINE, Q* 01/06/2023 Negative    • Microscopic Examination 01/06/2023 Comment    • Microscopic Examination 01/06/2023 See below:    • Urinalysis Reflex 01/06/2023 Comment    • SL AMB WBC, URINE 01/06/2023 None seen    • RBC, Urine 01/06/2023 0-2    • Epithelial Cells (non re* 01/06/2023 None seen    • Casts 01/06/2023 None seen    • Bacteria, Urine 01/06/2023 None seen          Radiology Results:   No results found.

## 2023-08-04 NOTE — TELEPHONE ENCOUNTER
Scheduled date of colonoscopy (as of today):08/22/23  Physician performing colonoscopy:Dr. Eugenia Toussaint  Location of colonoscopy:[facility]  Bowel prep reviewed with patient:Miralax, Dulcolax  Instructions reviewed with patient by:cristina  Clearances: n/a

## 2023-08-04 NOTE — H&P (VIEW-ONLY)
[facility] - Outpatient Follow-up Note  Garnett Seip 69 y.o. female MRN: [id number]  Encounter: [id number]          ASSESSMENT AND PLAN:      1. History of colon polyps  14 polyps on colonoscopy 1 year ago majority of which were adenomatous    - Colonoscopy; Future    2. Constipation, unspecified constipation type  We will maximize dietary fiber intake to the use of an over-the-counter fiber supplement titrated to effect. Contingency might include a trial of Amitiza, Trulance, etc.    ______________________________________________________________________    SUBJECTIVE: Patient with a history of 14 polyps on most recent colonoscopy 1 year ago. Has alternating constipation and diarrhea without significant abdominal pain.       REVIEW OF SYSTEMS:    ROS       Historical Information   Past Medical History:   Diagnosis Date   • Anxiety disorder    • Arthritis    • Bright red rectal bleeding    • Colon polyp    • DJD (degenerative joint disease)    • Hernia A year ago   • High cholesterol    • Hyperactivity of bladder    • Hypertension    • Irritable bowel syndrome Last July   • Macular degeneration    • Obesity    • Osteoporosis      Past Surgical History:   Procedure Laterality Date   • CHOLECYSTECTOMY     • COLONOSCOPY     • DXA PROCEDURE (HISTORICAL)  06/23/2021   • INTRAOCULAR LENS INSERTION     • MAMMO (HISTORICAL)  01/04/2022   • TOTAL SHOULDER REPLACEMENT Left      Social History   Social History     Substance and Sexual Activity   Alcohol Use No     Social History     Substance and Sexual Activity   Drug Use Never     Social History     Tobacco Use   Smoking Status Former   • Packs/day: 0.00   • Years: 15.00   • Total pack years: 0.00   • Types: Cigarettes   • Passive exposure: Past   Smokeless Tobacco Never   Tobacco Comments    quit cigarettes age 33     Family History   Problem Relation Age of Onset   • Hearing loss Mother    • Esophageal cancer Father    • Hypertension Sister • No Known Problems Sister    • Esophageal cancer Maternal Grandmother    • Diabetes Paternal Grandmother    • Breast cancer Maternal Aunt    • No Known Problems Maternal Aunt    • No Known Problems Maternal Aunt    • No Known Problems Maternal Aunt    • Skin cancer Paternal Aunt    • No Known Problems Paternal Aunt    • No Known Problems Paternal Aunt    • Cancer Paternal Aunt    • Breast cancer Cousin        Meds/Allergies       Current Outpatient Medications:   •  ALPRAZolam (XANAX) 0.25 mg tablet  •  aspirin (ECOTRIN LOW STRENGTH) 81 mg EC tablet  •  Biotin 1000 MCG tablet  •  Calcium Carb-Cholecalciferol 1000-800 MG-UNIT TABS  •  cholecalciferol (VITAMIN D3) 1,000 units tablet  •  Cinnamon 500 MG capsule  •  Cranberry 200 MG CAPS  •  docusate sodium (COLACE) 50 mg capsule  •  escitalopram (LEXAPRO) 10 mg tablet  •  Flaxseed, Linseed, (FLAX SEED OIL) 1000 MG CAPS  •  fluticasone (FLONASE) 50 mcg/act nasal spray  •  Multiple Vitamins-Minerals (PRESERVISION AREDS 2 PO)  •  Omega-3 Fatty Acids (FISH OIL) 645 MG CAPS  •  Potassium 95 MG TABS  •  telmisartan (MICARDIS) 40 mg tablet    Allergies   Allergen Reactions   • Zithromax [Azithromycin] Swelling   • Atorvastatin Other (See Comments)     legs get stiff   • Erythromycin Base Palpitations           Objective     Blood pressure 140/89, pulse 76, height 5' 2" (1.575 m), weight 86.2 kg (190 lb). Body mass index is 34.75 kg/m². PHYSICAL EXAM:      Physical Exam  Vitals and nursing note reviewed. Constitutional:       General: She is not in acute distress. Appearance: She is not ill-appearing. HENT:      Head: Normocephalic and atraumatic. Eyes:      General: No scleral icterus. Extraocular Movements: Extraocular movements intact. Cardiovascular:      Rate and Rhythm: Normal rate. Pulmonary:      Effort: Pulmonary effort is normal. No respiratory distress. Abdominal:      General: There is no distension.    Skin:     Coloration: Skin is not cyanotic. Findings: No erythema. Neurological:      General: No focal deficit present. Mental Status: She is alert and oriented to person, place, and time. Psychiatric:         Mood and Affect: Mood normal.         Behavior: Behavior normal.          Lab Results:   No visits with results within 1 Day(s) from this visit. Latest known visit with results is:   Orders Only on 01/06/2023   Component Date Value   • Specific Gravity 01/06/2023 1.018    • Ph 01/06/2023 7.5    • Color UA 01/06/2023 Yellow    • Urine Appearance 01/06/2023 Clear    • Leukocyte Esterase 01/06/2023 Negative    • Protein 01/06/2023 Negative    • Glucose, 24 HR Urine 01/06/2023 Negative    • Ketone, Urine 01/06/2023 Negative    • Blood, Urine 01/06/2023 Negative    • Bilirubin, Urine 01/06/2023 Negative    • Urobilinogen Urine 01/06/2023 0.2    • SL AMB NITRITES URINE, Q* 01/06/2023 Negative    • Microscopic Examination 01/06/2023 Comment    • Microscopic Examination 01/06/2023 See below:    • Urinalysis Reflex 01/06/2023 Comment    • SL AMB WBC, URINE 01/06/2023 None seen    • RBC, Urine 01/06/2023 0-2    • Epithelial Cells (non re* 01/06/2023 None seen    • Casts 01/06/2023 None seen    • Bacteria, Urine 01/06/2023 None seen          Radiology Results:   No results found.

## 2023-08-22 ENCOUNTER — ANESTHESIA (OUTPATIENT)
Dept: GASTROENTEROLOGY | Facility: AMBULARY SURGERY CENTER | Age: 78
End: 2023-08-22

## 2023-08-22 ENCOUNTER — HOSPITAL ENCOUNTER (OUTPATIENT)
Dept: GASTROENTEROLOGY | Facility: AMBULARY SURGERY CENTER | Age: 78
Setting detail: OUTPATIENT SURGERY
Discharge: HOME | End: 2023-08-22
Attending: INTERNAL MEDICINE
Payer: MEDICARE

## 2023-08-22 ENCOUNTER — ANESTHESIA EVENT (OUTPATIENT)
Dept: GASTROENTEROLOGY | Facility: AMBULARY SURGERY CENTER | Age: 78
End: 2023-08-22

## 2023-08-22 VITALS
HEART RATE: 62 BPM | OXYGEN SATURATION: 97 % | RESPIRATION RATE: 18 BRPM | TEMPERATURE: 98 F | SYSTOLIC BLOOD PRESSURE: 116 MMHG | DIASTOLIC BLOOD PRESSURE: 55 MMHG

## 2023-08-22 DIAGNOSIS — Z86.010 HISTORY OF COLON POLYPS: ICD-10-CM

## 2023-08-22 PROCEDURE — 88305 TISSUE EXAM BY PATHOLOGIST: CPT | Performed by: PATHOLOGY

## 2023-08-22 PROCEDURE — 45385 COLONOSCOPY W/LESION REMOVAL: CPT | Performed by: INTERNAL MEDICINE

## 2023-08-22 PROCEDURE — 45380 COLONOSCOPY AND BIOPSY: CPT | Performed by: INTERNAL MEDICINE

## 2023-08-22 RX ORDER — SODIUM CHLORIDE, SODIUM LACTATE, POTASSIUM CHLORIDE, CALCIUM CHLORIDE 600; 310; 30; 20 MG/100ML; MG/100ML; MG/100ML; MG/100ML
INJECTION, SOLUTION INTRAVENOUS CONTINUOUS PRN
Status: DISCONTINUED | OUTPATIENT
Start: 2023-08-22 | End: 2023-08-22

## 2023-08-22 RX ORDER — PROPOFOL 10 MG/ML
INJECTION, EMULSION INTRAVENOUS AS NEEDED
Status: DISCONTINUED | OUTPATIENT
Start: 2023-08-22 | End: 2023-08-22

## 2023-08-22 RX ORDER — SODIUM CHLORIDE, SODIUM LACTATE, POTASSIUM CHLORIDE, CALCIUM CHLORIDE 600; 310; 30; 20 MG/100ML; MG/100ML; MG/100ML; MG/100ML
125 INJECTION, SOLUTION INTRAVENOUS CONTINUOUS
Status: DISCONTINUED | OUTPATIENT
Start: 2023-08-22 | End: 2023-08-26 | Stop reason: HOSPADM

## 2023-08-22 RX ORDER — LIDOCAINE HYDROCHLORIDE 10 MG/ML
INJECTION, SOLUTION EPIDURAL; INFILTRATION; INTRACAUDAL; PERINEURAL AS NEEDED
Status: DISCONTINUED | OUTPATIENT
Start: 2023-08-22 | End: 2023-08-22

## 2023-08-22 RX ADMIN — SODIUM CHLORIDE, SODIUM LACTATE, POTASSIUM CHLORIDE, AND CALCIUM CHLORIDE: .6; .31; .03; .02 INJECTION, SOLUTION INTRAVENOUS at 10:17

## 2023-08-22 RX ADMIN — PROPOFOL 50 MG: 10 INJECTION, EMULSION INTRAVENOUS at 10:29

## 2023-08-22 RX ADMIN — PROPOFOL 100 MG: 10 INJECTION, EMULSION INTRAVENOUS at 10:26

## 2023-08-22 RX ADMIN — PROPOFOL 100 MG: 10 INJECTION, EMULSION INTRAVENOUS at 10:21

## 2023-08-22 RX ADMIN — SODIUM CHLORIDE, SODIUM LACTATE, POTASSIUM CHLORIDE, AND CALCIUM CHLORIDE 125 ML/HR: .6; .31; .03; .02 INJECTION, SOLUTION INTRAVENOUS at 10:05

## 2023-08-22 RX ADMIN — PROPOFOL 50 MG: 10 INJECTION, EMULSION INTRAVENOUS at 10:36

## 2023-08-22 RX ADMIN — LIDOCAINE HYDROCHLORIDE 100 MG: 10 INJECTION, SOLUTION EPIDURAL; INFILTRATION; INTRACAUDAL; PERINEURAL at 10:21

## 2023-08-22 NOTE — ANESTHESIA PREPROCEDURE EVALUATION
Procedure:  COLONOSCOPY    Relevant Problems   CARDIO   (+) Primary hypertension      MUSCULOSKELETAL   (+) Primary osteoarthritis involving multiple joints      NEURO/PSYCH   (+) Anxiety and depression   (+) Recurrent major depressive disorder, in full remission (HCC)        Physical Exam    Airway    Mallampati score: II  TM Distance: >3 FB  Neck ROM: full     Dental   No notable dental hx     Cardiovascular  Cardiovascular exam normal    Pulmonary  Pulmonary exam normal     Other Findings        Anesthesia Plan  ASA Score- 2     Anesthesia Type- IV sedation with anesthesia with ASA Monitors. Additional Monitors:   Airway Plan:           Plan Factors-Exercise tolerance (METS): >4 METS. Chart reviewed. Existing labs reviewed. Patient summary reviewed. Patient is not a current smoker. Obstructive sleep apnea risk education given perioperatively. Induction-     Postoperative Plan-     Informed Consent- Anesthetic plan and risks discussed with patient. I personally reviewed this patient with the CRNA. Discussed and agreed on the Anesthesia Plan with the CRNA. Ace San

## 2023-08-22 NOTE — INTERVAL H&P NOTE
H&P reviewed. After examining the patient I find no changes in the patients condition since the H&P had been written.     Vitals:    08/22/23 1002   BP: 139/78   Pulse: 78   Resp: 18   Temp: 98 °F (36.7 °C)   SpO2: 99%

## 2023-08-22 NOTE — ANESTHESIA POSTPROCEDURE EVALUATION
Post-Op Assessment Note    CV Status:  Stable  Pain Score: 0    Pain management: adequate     Mental Status:  Somnolent and sleepy   Hydration Status:  Euvolemic and stable   PONV Controlled:  Controlled   Airway Patency:  Patent      Post Op Vitals Reviewed: Yes      Staff: CRNA         No notable events documented.     BP   116/55   Temp      Pulse  61   Resp   15   SpO2   98%

## 2023-08-26 PROCEDURE — 88305 TISSUE EXAM BY PATHOLOGIST: CPT | Performed by: PATHOLOGY

## 2023-08-31 ENCOUNTER — OFFICE VISIT (OUTPATIENT)
Dept: FAMILY MEDICINE CLINIC | Facility: CLINIC | Age: 78
End: 2023-08-31
Payer: MEDICARE

## 2023-08-31 VITALS
DIASTOLIC BLOOD PRESSURE: 78 MMHG | BODY MASS INDEX: 34.3 KG/M2 | HEART RATE: 78 BPM | HEIGHT: 62 IN | OXYGEN SATURATION: 97 % | WEIGHT: 186.4 LBS | SYSTOLIC BLOOD PRESSURE: 122 MMHG

## 2023-08-31 DIAGNOSIS — F41.9 ANXIETY AND DEPRESSION: ICD-10-CM

## 2023-08-31 DIAGNOSIS — M15.9 PRIMARY OSTEOARTHRITIS INVOLVING MULTIPLE JOINTS: ICD-10-CM

## 2023-08-31 DIAGNOSIS — E66.09 CLASS 1 OBESITY DUE TO EXCESS CALORIES WITH SERIOUS COMORBIDITY AND BODY MASS INDEX (BMI) OF 34.0 TO 34.9 IN ADULT: ICD-10-CM

## 2023-08-31 DIAGNOSIS — I10 PRIMARY HYPERTENSION: ICD-10-CM

## 2023-08-31 DIAGNOSIS — Z01.818 PREOPERATIVE CLEARANCE: Primary | ICD-10-CM

## 2023-08-31 DIAGNOSIS — K64.8 OTHER HEMORRHOIDS: ICD-10-CM

## 2023-08-31 DIAGNOSIS — F32.A ANXIETY AND DEPRESSION: ICD-10-CM

## 2023-08-31 PROBLEM — N81.4 UTEROVAGINAL PROLAPSE: Status: ACTIVE | Noted: 2023-08-31

## 2023-08-31 PROCEDURE — 99214 OFFICE O/P EST MOD 30 MIN: CPT | Performed by: INTERNAL MEDICINE

## 2023-08-31 NOTE — ASSESSMENT & PLAN NOTE
Patient with anxiety and depression symptoms takes occasionally Xanax she has not taken the Lexapro we will discontinue that. Patient has been also under a lot of stress.

## 2023-08-31 NOTE — PROGRESS NOTES
Office Visit Note  23     Tony Fat 69 y.o. female MRN: [id number]  : 1945    Assessment:     1. Preoperative clearance  Assessment & Plan:  Patient is scheduled for bladder left by the urogynecologist reviewed all the records from the urogynecologist scheduled for surgery on  lab work unremarkable EKG unremarkable no acute problems to contraindicate surgery patient is cleared for surgery. 2. Anxiety and depression  Assessment & Plan:  Patient with anxiety and depression symptoms takes occasionally Xanax she has not taken the Lexapro we will discontinue that. Patient has been also under a lot of stress. 3. Class 1 obesity due to excess calories with serious comorbidity and body mass index (BMI) of 34.0 to 34.9 in adult  Assessment & Plan:  Patient BMI is 34.09 slightly less compared to before again emphasized regarding lifestyle modification calorie count cutting back carbohydrate intake. 4. Other hemorrhoids    5. Primary hypertension  Assessment & Plan:  Blood pressure is stable at 122/78 currently she is taking telmisartan 40 mg daily      6. Primary osteoarthritis involving multiple joints             Discussion Summary and Plan: Today's care plan and medications were reviewed with patient in detail and all their questions answered to their satisfaction. Chief Complaint   Patient presents with   • Follow-up      Subjective:  Patient is coming here for the follow-up evaluation with regards to his symptoms of urinary frequency especially in the nighttime. She was seen by the urogynecologist and had testing done and was recommended to have surgery to look at the bladder she had been diagnosed having uterovaginal prolapse cystocele. Patient denies any symptoms of abdominal pain nausea vomiting. I reviewed all the records from the urogynecologist.  Labs reviewed CBC chemistry is unremarkable EKG has not shown any acute changes.   According to the patient she had a urine analysis also done but I am not seeing the report. I reviewed the lab reports and EKG unremarkable      The following portions of the patient's history were reviewed and updated as appropriate: allergies, current medications, past family history, past medical history, past social history, past surgical history and problem list.    Review of Systems   Constitutional: Negative for chills and fever. HENT: Negative for ear pain and sore throat. Eyes: Negative for pain and visual disturbance. Respiratory: Negative for cough and shortness of breath. Cardiovascular: Negative for chest pain and palpitations. Gastrointestinal: Negative for abdominal pain and vomiting. Genitourinary: Negative for dysuria and hematuria. Musculoskeletal: Negative for arthralgias and back pain. Skin: Negative for color change and rash. Neurological: Negative for seizures and syncope. All other systems reviewed and are negative.         Historical Information   Patient Active Problem List   Diagnosis   • Primary hypertension   • Class 1 obesity due to excess calories in adult   • Dyslipidemia   • Primary osteoarthritis involving multiple joints   • Urinary frequency   • Muscle cramps   • Unsteady gait   • Anxiety and depression   • Other hemorrhoids   • Recurrent major depressive disorder, in full remission ([address])   • Uterovaginal prolapse   • Preoperative clearance     Past Medical History:   Diagnosis Date   • Anxiety disorder    • Arthritis    • Bright red rectal bleeding    • Colon polyp    • DJD (degenerative joint disease)    • Hernia A year ago   • High cholesterol    • Hyperactivity of bladder    • Hypertension    • Irritable bowel syndrome Last July   • Macular degeneration    • Obesity    • Osteoporosis      Past Surgical History:   Procedure Laterality Date   • CHOLECYSTECTOMY     • COLONOSCOPY     • DXA PROCEDURE (HISTORICAL)  06/23/2021   • INTRAOCULAR LENS INSERTION     • MAMMO (HISTORICAL)  01/04/2022   • TOTAL SHOULDER REPLACEMENT Left      Social History     Substance and Sexual Activity   Alcohol Use No     Social History     Substance and Sexual Activity   Drug Use Never     Social History     Tobacco Use   Smoking Status Former   • Packs/day: 0.00   • Years: 15.00   • Total pack years: 0.00   • Types: Cigarettes   • Passive exposure: Past   Smokeless Tobacco Never   Tobacco Comments    quit cigarettes age 33     Family History   Problem Relation Age of Onset   • Hearing loss Mother    • Esophageal cancer Father    • Hypertension Sister    • No Known Problems Sister    • Esophageal cancer Maternal Grandmother    • Diabetes Paternal Grandmother    • Breast cancer Maternal Aunt    • No Known Problems Maternal Aunt    • No Known Problems Maternal Aunt    • No Known Problems Maternal Aunt    • Skin cancer Paternal Aunt    • No Known Problems Paternal Aunt    • No Known Problems Paternal Aunt    • Cancer Paternal Aunt    • Breast cancer Cousin      Health Maintenance Due   Topic   • Hepatitis C Screening    • Medicare Annual Wellness Visit (AWV)    • Pneumococcal Vaccine: 65+ Years (1 - PCV)   • COVID-19 Vaccine (3 - Pfizer series)   • PT PLAN OF CARE    • Influenza Vaccine (1)      Meds/Allergies       Current Outpatient Medications:   •  ALPRAZolam (XANAX) 0.25 mg tablet, Take 1 tablet (0.25 mg total) by mouth 2 (two) times a day as needed for anxiety, Disp: 40 tablet, Rfl: 0  •  aspirin (ECOTRIN LOW STRENGTH) 81 mg EC tablet, Take 81 mg by mouth daily, Disp: , Rfl:   •  Biotin 1000 MCG tablet, Take 1,000 mcg by mouth 3 (three) times a day, Disp: , Rfl:   •  cholecalciferol (VITAMIN D3) 1,000 units tablet, Vitamin D TABS  Refills: 0  Active, Disp: , Rfl:   •  Cinnamon 500 MG capsule, Cinnamon CAPS  Refills: 0  Active, Disp: , Rfl:   •  Cranberry 200 MG CAPS, Take by mouth in the morning, Disp: , Rfl:   •  docusate sodium (COLACE) 50 mg capsule, Take by mouth, Disp: , Rfl:   •  Flaxseed, Linseed, (FLAX SEED OIL) 1000 MG CAPS, Flax Seed Oil 1000 MG Oral Capsule  Refills: 0  Active, Disp: , Rfl:   •  fluticasone (FLONASE) 50 mcg/act nasal spray, 2 sprays into each nostril daily, Disp: 11.1 mL, Rfl: 1  •  Multiple Vitamins-Minerals (PRESERVISION AREDS 2 PO), Take by mouth, Disp: , Rfl:   •  Omega-3 Fatty Acids (FISH OIL) 645 MG CAPS, Fish Oil CAPS  Refills: 0  Active, Disp: , Rfl:   •  telmisartan (MICARDIS) 40 mg tablet, Take 1 tablet (40 mg total) by mouth daily, Disp: 90 tablet, Rfl: 1  •  Potassium 95 MG TABS, Potassium TABS  Refills: 0  Active (Patient not taking: Reported on 8/31/2023), Disp: , Rfl:       Objective:    Vitals:   /78 (BP Location: Right arm, Patient Position: Sitting, Cuff Size: Standard)   Pulse 78   Ht 5' 2" (1.575 m)   Wt 84.6 kg (186 lb 6.4 oz)   SpO2 97%   BMI 34.09 kg/m²   Body mass index is 34.09 kg/m². Vitals:    08/31/23 1340   Weight: 84.6 kg (186 lb 6.4 oz)       Physical Exam  Vitals and nursing note reviewed. Constitutional:       Appearance: Normal appearance. HENT:      Head: Normocephalic and atraumatic. Right Ear: Tympanic membrane normal.      Left Ear: Tympanic membrane normal.      Mouth/Throat:      Mouth: Mucous membranes are moist.   Eyes:      Pupils: Pupils are equal, round, and reactive to light. Cardiovascular:      Rate and Rhythm: Normal rate and regular rhythm. Heart sounds: Normal heart sounds. Pulmonary:      Effort: Pulmonary effort is normal.      Breath sounds: Normal breath sounds. Abdominal:      General: Abdomen is flat. Palpations: Abdomen is soft. Musculoskeletal:      Cervical back: Normal range of motion and neck supple. Right lower leg: No edema. Left lower leg: No edema. Skin:     General: Skin is warm and dry. Neurological:      Mental Status: She is alert and oriented to person, place, and time.          Lab Review   Hospital Outpatient Visit on 08/22/2023   Component Date Value Ref Range Status   • Case Report 08/22/2023    Final                    Value:Surgical Pathology Report                         Case: [id number]                                   Authorizing Provider:  Kate Mccloud MD         Collected:           08/22/2023 1038              Ordering Location:     [facility]   Received:            08/22/2023 [facility]                                                                       Pathologist:           Margoth Carrillo MD                                                                    Specimens:   A) - Large Intestine, Right/Ascending Colon, polyp bx & cold snare                                  B) - Rectum, polyp b                                                                      • Final Diagnosis 08/22/2023    Final                    Value: This result contains rich text formatting which cannot be displayed here. • Additional Information 08/22/2023    Final                    Value: This result contains rich text formatting which cannot be displayed here. • Synoptic Checklist 08/22/2023    Final                    Value:                            COLON/RECTUM POLYP FORM - GI - All Specimens                                                                                     :    Adenoma(s)     • Gross Description 08/22/2023    Final                    Value: This result contains rich text formatting which cannot be displayed here. Harlan Orellana MD        "This note has been constructed using a voice recognition system. Therefore there may be syntax, spelling, and/or grammatical errors.  Please call if you have any questions. "

## 2023-08-31 NOTE — ASSESSMENT & PLAN NOTE
Patient is scheduled for bladder left by the urogynecologist reviewed all the records from the urogynecologist scheduled for surgery on September 12 lab work unremarkable EKG unremarkable no acute problems to contraindicate surgery patient is cleared for surgery.

## 2023-08-31 NOTE — ASSESSMENT & PLAN NOTE
Patient with uterovaginal prolapse scheduled for surgery September 12 no acute problems at this present time to contraindicate surgery

## 2023-08-31 NOTE — ASSESSMENT & PLAN NOTE
Patient BMI is 34.09 slightly less compared to before again emphasized regarding lifestyle modification calorie count cutting back carbohydrate intake.

## 2023-08-31 NOTE — ASSESSMENT & PLAN NOTE
Patient with hypercholesterolemia he is going to get the blood test done after the surgery we will follow it up she could not tolerate the statins in the past.

## 2023-09-28 ENCOUNTER — CLINICAL SUPPORT (OUTPATIENT)
Dept: FAMILY MEDICINE CLINIC | Facility: CLINIC | Age: 78
End: 2023-09-28
Payer: MEDICARE

## 2023-09-28 DIAGNOSIS — Z23 NEED FOR VACCINATION: Primary | ICD-10-CM

## 2023-09-28 PROCEDURE — G0008 ADMIN INFLUENZA VIRUS VAC: HCPCS

## 2023-09-28 PROCEDURE — 90662 IIV NO PRSV INCREASED AG IM: CPT

## 2023-10-06 LAB
25(OH)D3+25(OH)D2 SERPL-MCNC: 99.1 NG/ML (ref 30–100)
ALBUMIN SERPL-MCNC: 3.7 G/DL (ref 3.8–4.8)
ALBUMIN/GLOB SERPL: 1.6 {RATIO} (ref 1.2–2.2)
ALP SERPL-CCNC: 86 IU/L (ref 44–121)
ALT SERPL-CCNC: 11 IU/L (ref 0–32)
APPEARANCE UR: ABNORMAL
AST SERPL-CCNC: 11 IU/L (ref 0–40)
BACTERIA UR CULT: NORMAL
BACTERIA URNS QL MICRO: ABNORMAL
BASOPHILS # BLD AUTO: 0.1 X10E3/UL (ref 0–0.2)
BASOPHILS NFR BLD AUTO: 1 %
BILIRUB SERPL-MCNC: 0.3 MG/DL (ref 0–1.2)
BILIRUB UR QL STRIP: NEGATIVE
BUN SERPL-MCNC: 11 MG/DL (ref 8–27)
BUN/CREAT SERPL: 13 (ref 12–28)
CALCIUM SERPL-MCNC: 8.9 MG/DL (ref 8.7–10.3)
CASTS URNS QL MICRO: ABNORMAL /LPF
CHLORIDE SERPL-SCNC: 104 MMOL/L (ref 96–106)
CHOLEST SERPL-MCNC: 196 MG/DL (ref 100–199)
CO2 SERPL-SCNC: 24 MMOL/L (ref 20–29)
COLOR UR: YELLOW
CREAT SERPL-MCNC: 0.83 MG/DL (ref 0.57–1)
EGFR: 73 ML/MIN/1.73
EOSINOPHIL # BLD AUTO: 0.4 X10E3/UL (ref 0–0.4)
EOSINOPHIL NFR BLD AUTO: 5 %
EPI CELLS #/AREA URNS HPF: ABNORMAL /HPF (ref 0–10)
ERYTHROCYTE [DISTWIDTH] IN BLOOD BY AUTOMATED COUNT: 13.1 % (ref 11.7–15.4)
GLOBULIN SER-MCNC: 2.3 G/DL (ref 1.5–4.5)
GLUCOSE SERPL-MCNC: 95 MG/DL (ref 70–99)
GLUCOSE UR QL: NEGATIVE
HBA1C MFR BLD: 5.5 % (ref 4.8–5.6)
HCT VFR BLD AUTO: 36.1 % (ref 34–46.6)
HDLC SERPL-MCNC: 64 MG/DL
HGB BLD-MCNC: 11.8 G/DL (ref 11.1–15.9)
HGB UR QL STRIP: NEGATIVE
IMM GRANULOCYTES # BLD: 0 X10E3/UL (ref 0–0.1)
IMM GRANULOCYTES NFR BLD: 0 %
KETONES UR QL STRIP: NEGATIVE
LDLC SERPL CALC-MCNC: 116 MG/DL (ref 0–99)
LEUKOCYTE ESTERASE UR QL STRIP: ABNORMAL
LYMPHOCYTES # BLD AUTO: 1.9 X10E3/UL (ref 0.7–3.1)
LYMPHOCYTES NFR BLD AUTO: 24 %
Lab: NORMAL
MCH RBC QN AUTO: 30.3 PG (ref 26.6–33)
MCHC RBC AUTO-ENTMCNC: 32.7 G/DL (ref 31.5–35.7)
MCV RBC AUTO: 93 FL (ref 79–97)
MICRO URNS: ABNORMAL
MONOCYTES # BLD AUTO: 0.7 X10E3/UL (ref 0.1–0.9)
MONOCYTES NFR BLD AUTO: 9 %
NEUTROPHILS # BLD AUTO: 4.8 X10E3/UL (ref 1.4–7)
NEUTROPHILS NFR BLD AUTO: 61 %
NITRITE UR QL STRIP: NEGATIVE
PH UR STRIP: 7.5 [PH] (ref 5–7.5)
PLATELET # BLD AUTO: 311 X10E3/UL (ref 150–450)
POTASSIUM SERPL-SCNC: 4.2 MMOL/L (ref 3.5–5.2)
PROT SERPL-MCNC: 6 G/DL (ref 6–8.5)
PROT UR QL STRIP: NEGATIVE
RBC # BLD AUTO: 3.9 X10E6/UL (ref 3.77–5.28)
RBC #/AREA URNS HPF: ABNORMAL /HPF (ref 0–2)
SL AMB URINALYSIS REFLEX: ABNORMAL
SL AMB VLDL CHOLESTEROL CALC: 16 MG/DL (ref 5–40)
SODIUM SERPL-SCNC: 140 MMOL/L (ref 134–144)
SP GR UR: 1.02 (ref 1–1.03)
TRIGL SERPL-MCNC: 91 MG/DL (ref 0–149)
TSH SERPL DL<=0.005 MIU/L-ACNC: 1.47 UIU/ML (ref 0.45–4.5)
UROBILINOGEN UR STRIP-ACNC: 0.2 MG/DL (ref 0.2–1)
WBC # BLD AUTO: 7.9 X10E3/UL (ref 3.4–10.8)
WBC #/AREA URNS HPF: ABNORMAL /HPF (ref 0–5)

## 2023-10-10 ENCOUNTER — OFFICE VISIT (OUTPATIENT)
Dept: FAMILY MEDICINE CLINIC | Facility: CLINIC | Age: 78
End: 2023-10-10
Payer: MEDICARE

## 2023-10-10 VITALS
HEIGHT: 62 IN | DIASTOLIC BLOOD PRESSURE: 70 MMHG | BODY MASS INDEX: 33.75 KG/M2 | HEART RATE: 75 BPM | OXYGEN SATURATION: 98 % | WEIGHT: 183.4 LBS | SYSTOLIC BLOOD PRESSURE: 120 MMHG

## 2023-10-10 DIAGNOSIS — F32.A ANXIETY AND DEPRESSION: ICD-10-CM

## 2023-10-10 DIAGNOSIS — F41.9 ANXIETY AND DEPRESSION: ICD-10-CM

## 2023-10-10 DIAGNOSIS — R35.0 URINARY FREQUENCY: ICD-10-CM

## 2023-10-10 DIAGNOSIS — M81.0 AGE-RELATED OSTEOPOROSIS WITHOUT CURRENT PATHOLOGICAL FRACTURE: ICD-10-CM

## 2023-10-10 DIAGNOSIS — R26.81 UNSTEADY GAIT: ICD-10-CM

## 2023-10-10 DIAGNOSIS — R32 URINARY INCONTINENCE, UNSPECIFIED TYPE: ICD-10-CM

## 2023-10-10 DIAGNOSIS — E78.5 DYSLIPIDEMIA: ICD-10-CM

## 2023-10-10 DIAGNOSIS — I10 PRIMARY HYPERTENSION: Primary | ICD-10-CM

## 2023-10-10 DIAGNOSIS — Z00.00 MEDICARE ANNUAL WELLNESS VISIT, SUBSEQUENT: ICD-10-CM

## 2023-10-10 DIAGNOSIS — E66.09 CLASS 1 OBESITY DUE TO EXCESS CALORIES WITH SERIOUS COMORBIDITY AND BODY MASS INDEX (BMI) OF 34.0 TO 34.9 IN ADULT: ICD-10-CM

## 2023-10-10 PROCEDURE — G0438 PPPS, INITIAL VISIT: HCPCS | Performed by: INTERNAL MEDICINE

## 2023-10-10 PROCEDURE — 99213 OFFICE O/P EST LOW 20 MIN: CPT | Performed by: INTERNAL MEDICINE

## 2023-10-10 NOTE — ASSESSMENT & PLAN NOTE
Lipid profile had shown cholesterol 196 it was 205 triglycerides 91 HDL 64  no history of coronary artery disease no family history of coronary artery disease recommend continue to follow strict diet

## 2023-10-10 NOTE — ASSESSMENT & PLAN NOTE
BMI 33.54 discussed with patient regarding cutting back carbohydrate intake calorie intake lifestyle modification lose weight

## 2023-10-10 NOTE — ASSESSMENT & PLAN NOTE
According to the patient she is doing much better with her balance after she had been to the balance center we will continue to monitor

## 2023-10-10 NOTE — ASSESSMENT & PLAN NOTE
Patient had surgery for the prolapse of the uterovaginal but her symptoms of incontinence of urine is still persisting she has a follow-up appointment with the urogynecologist.

## 2023-10-10 NOTE — ASSESSMENT & PLAN NOTE
Patient still with incontinence of urine even after repair of the uterovaginal prolapse she has been given 3 options regarding the same including medication, Botox, stimulator she has a follow-up appointment with the urogynecologist.  Urine analysis had shown bacteria probably contaminant we will order a repeat urine analysis prior to her visit with the urogynecologist in the setting of increased frequency with urination.

## 2023-10-10 NOTE — PATIENT INSTRUCTIONS
Medicare Preventive Visit Patient Instructions  Thank you for completing your Welcome to Medicare Visit or Medicare Annual Wellness Visit today. Your next wellness visit will be due in one year (10/10/2024). The screening/preventive services that you may require over the next 5-10 years are detailed below. Some tests may not apply to you based off risk factors and/or age. Screening tests ordered at today's visit but not completed yet may show as past due. Also, please note that scanned in results may not display below. Preventive Screenings:  Service Recommendations Previous Testing/Comments   Colorectal Cancer Screening  * Colonoscopy    * Fecal Occult Blood Test (FOBT)/Fecal Immunochemical Test (FIT)  * Fecal DNA/Cologuard Test  * Flexible Sigmoidoscopy Age: 43-73 years old   Colonoscopy: every 10 years (may be performed more frequently if at higher risk)  OR  FOBT/FIT: every 1 year  OR  Cologuard: every 3 years  OR  Sigmoidoscopy: every 5 years  Screening may be recommended earlier than age 39 if at higher risk for colorectal cancer. Also, an individualized decision between you and your healthcare provider will decide whether screening between the ages of 77-80 would be appropriate. Colonoscopy: 08/22/2023  FOBT/FIT: Not on file  Cologuard: Not on file  Sigmoidoscopy: Not on file          Breast Cancer Screening Age: 36 years old  Frequency: every 1-2 years  Not required if history of left and right mastectomy Mammogram: 02/10/2023        Cervical Cancer Screening Between the ages of 21-29, pap smear recommended once every 3 years. Between the ages of 32-69, can perform pap smear with HPV co-testing every 5 years.    Recommendations may differ for women with a history of total hysterectomy, cervical cancer, or abnormal pap smears in past. Pap Smear: Not on file        Hepatitis C Screening Once for adults born between [address] [place] [address]  More frequently in patients at high risk for Hepatitis C Hep C Antibody: Not on file        Diabetes Screening 1-2 times per year if you're at risk for diabetes or have pre-diabetes Fasting glucose: No results in last 5 years (No results in last 5 years)  A1C: 5.5 % (10/4/2023)      Cholesterol Screening Once every 5 years if you don't have a lipid disorder. May order more often based on risk factors. Lipid panel: 10/04/2023          Other Preventive Screenings Covered by Medicare:  1. Abdominal Aortic Aneurysm (AAA) Screening: covered once if your at risk. You're considered to be at risk if you have a family history of AAA. 2. Lung Cancer Screening: covers low dose CT scan once per year if you meet all of the following conditions: (1) Age 48-67; (2) No signs or symptoms of lung cancer; (3) Current smoker or have quit smoking within the last 15 years; (4) You have a tobacco smoking history of at least 20 pack years (packs per day multiplied by number of years you smoked); (5) You get a written order from a healthcare provider. 3. Glaucoma Screening: covered annually if you're considered high risk: (1) You have diabetes OR (2) Family history of glaucoma OR (3)  aged 48 and older OR (3)  American aged 72 and older  3. Osteoporosis Screening: covered every 2 years if you meet one of the following conditions: (1) You're estrogen deficient and at risk for osteoporosis based off medical history and other findings; (2) Have a vertebral abnormality; (3) On glucocorticoid therapy for more than 3 months; (4) Have primary hyperparathyroidism; (5) On osteoporosis medications and need to assess response to drug therapy. · Last bone density test (DXA Scan): 06/23/2021.  5. HIV Screening: covered annually if you're between the age of 15-65. Also covered annually if you are younger than 13 and older than 72 with risk factors for HIV infection. For pregnant patients, it is covered up to 3 times per pregnancy.     Immunizations:  Immunization Recommendations   Influenza Vaccine Annual influenza vaccination during flu season is recommended for all persons aged >= 6 months who do not have contraindications   Pneumococcal Vaccine   * Pneumococcal conjugate vaccine = PCV13 (Prevnar 13), PCV15 (Vaxneuvance), PCV20 (Prevnar 20)  * Pneumococcal polysaccharide vaccine = PPSV23 (Pneumovax) Adults 90-69 yo with certain risk factors or if 65+ yo  · If never received any pneumonia vaccine: recommend Prevnar 20 (PCV20)  · Give PCV20 if previously received 1 dose of PCV13 or PPSV23   Hepatitis B Vaccine 3 dose series if at intermediate or high risk (ex: diabetes, end stage renal disease, liver disease)   Respiratory syncytial virus (RSV) Vaccine - COVERED BY MEDICARE PART D  * RSVPreF3 (Arexvy) CDC recommends that adults 61years of age and older may receive a single dose of RSV vaccine using shared clinical decision-making (SCDM)   Tetanus (Td) Vaccine - COST NOT COVERED BY MEDICARE PART B Following completion of primary series, a booster dose should be given every 10 years to maintain immunity against tetanus. Td may also be given as tetanus wound prophylaxis. Tdap Vaccine - COST NOT COVERED BY MEDICARE PART B Recommended at least once for all adults. For pregnant patients, recommended with each pregnancy. Shingles Vaccine (Shingrix) - COST NOT COVERED BY MEDICARE PART B  2 shot series recommended in those 19 years and older who have or will have weakened immune systems or those 50 years and older     Health Maintenance Due:      Topic Date Due   • Hepatitis C Screening  Never done   • Colorectal Cancer Screening  08/21/2026     Immunizations Due:      Topic Date Due   • Pneumococcal Vaccine: 65+ Years (1 - PCV) Never done   • COVID-19 Vaccine (3 - Pfizer series) 04/29/2021     Advance Directives   What are advance directives? Advance directives are legal documents that state your wishes and plans for medical care.  These plans are made ahead of time in case you lose your ability to make decisions for yourself. Advance directives can apply to any medical decision, such as the treatments you want, and if you want to donate organs. What are the types of advance directives? There are many types of advance directives, and each state has rules about how to use them. You may choose a combination of any of the following:  · Living will: This is a written record of the treatment you want. You can also choose which treatments you do not want, which to limit, and which to stop at a certain time. This includes surgery, medicine, IV fluid, and tube feedings. · Durable power of  for healthcare [facility]): This is a written record that states who you want to make healthcare choices for you when you are unable to make them for yourself. This person, called a proxy, is usually a family member or a friend. You may choose more than 1 proxy. · Do not resuscitate (DNR) order:  A DNR order is used in case your heart stops beating or you stop breathing. It is a request not to have certain forms of treatment, such as CPR. A DNR order may be included in other types of advance directives. · Medical directive: This covers the care that you want if you are in a coma, near death, or unable to make decisions for yourself. You can list the treatments you want for each condition. Treatment may include pain medicine, surgery, blood transfusions, dialysis, IV or tube feedings, and a ventilator (breathing machine). · Values history: This document has questions about your views, beliefs, and how you feel and think about life. This information can help others choose the care that you would choose. Why are advance directives important? An advance directive helps you control your care. Although spoken wishes may be used, it is better to have your wishes written down. Spoken wishes can be misunderstood, or not followed. Treatments may be given even if you do not want them.  An advance directive may make it easier for your family to make difficult choices about your care. Weight Management   Why it is important to manage your weight:  Being overweight increases your risk of health conditions such as heart disease, high blood pressure, type 2 diabetes, and certain types of cancer. It can also increase your risk for osteoarthritis, sleep apnea, and other respiratory problems. Aim for a slow, steady weight loss. Even a small amount of weight loss can lower your risk of health problems. How to lose weight safely:  A safe and healthy way to lose weight is to eat fewer calories and get regular exercise. You can lose up about 1 pound a week by decreasing the number of calories you eat by 500 calories each day. Healthy meal plan for weight management:  A healthy meal plan includes a variety of foods, contains fewer calories, and helps you stay healthy. A healthy meal plan includes the following:  · Eat whole-grain foods more often. A healthy meal plan should contain fiber. Fiber is the part of grains, fruits, and vegetables that is not broken down by your body. Whole-grain foods are healthy and provide extra fiber in your diet. Some examples of whole-grain foods are whole-wheat breads and pastas, oatmeal, brown rice, and bulgur. · Eat a variety of vegetables every day. Include dark, leafy greens such as spinach, kale, bk greens, and mustard greens. Eat yellow and orange vegetables such as carrots, sweet potatoes, and winter squash. · Eat a variety of fruits every day. Choose fresh or canned fruit (canned in its own juice or light syrup) instead of juice. Fruit juice has very little or no fiber. · Eat low-fat dairy foods. Drink fat-free (skim) milk or 1% milk. Eat fat-free yogurt and low-fat cottage cheese. Try low-fat cheeses such as mozzarella and other reduced-fat cheeses. · Choose meat and other protein foods that are low in fat. Choose beans or other legumes such as split peas or lentils.  Choose fish, skinless poultry (chicken or turkey), or lean cuts of red meat (beef or pork). Before you cook meat or poultry, cut off any visible fat. · Use less fat and oil. Try baking foods instead of frying them. Add less fat, such as margarine, sour cream, regular salad dressing and mayonnaise to foods. Eat fewer high-fat foods. Some examples of high-fat foods include french fries, doughnuts, ice cream, and cakes. · Eat fewer sweets. Limit foods and drinks that are high in sugar. This includes candy, cookies, regular soda, and sweetened drinks. Exercise:  Exercise at least 30 minutes per day on most days of the week. Some examples of exercise include walking, biking, dancing, and swimming. You can also fit in more physical activity by taking the stairs instead of the elevator or parking farther away from stores. Ask your healthcare provider about the best exercise plan for you. © Copyright [employer] 2018 Information is for End User's use only and may not be sold, redistributed or otherwise used for commercial purposes.  All illustrations and images included in CareNotes® are the copyrighted property of [employer] or [address] [place] [address]

## 2023-10-10 NOTE — ASSESSMENT & PLAN NOTE
Patient with.'s of anxiety in the past was taking Xanax but she has been doing relatively well she has as needed Xanax at home

## 2023-10-10 NOTE — PROGRESS NOTES
Assessment and Plan:     Problem List Items Addressed This Visit        Cardiovascular and Mediastinum    Primary hypertension - Primary     Pressure is stable 120/70 continue telmisartan 40 mg daily            Other    Class 1 obesity due to excess calories in adult     BMI 33.54 discussed with patient regarding cutting back carbohydrate intake calorie intake lifestyle modification lose weight         Dyslipidemia     Lipid profile had shown cholesterol 196 it was 205 triglycerides 91 HDL 64  no history of coronary artery disease no family history of coronary artery disease recommend continue to follow strict diet         Urinary frequency    Relevant Orders    UA w Reflex to Microscopic w Reflex to Culture    Unsteady gait     According to the patient she is doing much better with her balance after she had been to the balance center we will continue to monitor         Anxiety and depression     Patient with.'s of anxiety in the past was taking Xanax but she has been doing relatively well she has as needed Xanax at home         Urinary incontinence     Patient still with incontinence of urine even after repair of the uterovaginal prolapse she has been given 3 options regarding the same including medication, Botox, stimulator she has a follow-up appointment with the urogynecologist.  Urine analysis had shown bacteria probably contaminant we will order a repeat urine analysis prior to her visit with the urogynecologist in the setting of increased frequency with urination. Other Visit Diagnoses     Medicare annual wellness visit, subsequent        Age-related osteoporosis without current pathological fracture        Relevant Orders    DXA bone density spine hip and pelvis          Urinary Incontinence Plan of Care: counseling topics discussed: practice Kegel (pelvic floor strengthening) exercises and keeping a bladder diary.  Patient underwent surgery for uterovaginal prolapse by the urogynecologist but the symptoms are still persisting she is going to have a follow-up appointment with them. She is going to discuss regarding medications, stimulator, Botox. Preventive health issues were discussed with patient, and age appropriate screening tests were ordered as noted in patient's After Visit Summary. Personalized health advice and appropriate referrals for health education or preventive services given if needed, as noted in patient's After Visit Summary. History of Present Illness:     Patient presents for a Medicare Wellness Visit    Patient is coming here for a follow-up evaluation with regards to the symptoms of hypertension, urinary incontinence, obesity,. Patient also had unsteady gait which is getting better. Reviewed the reports of the gynecologist she underwent recently procedure for uterovaginal prolapse had perineorrhaphy with symptoms of urinary incontinence is still persisting follow-up appointment with them. Patient's vitamin D level came up high at 99 recommend patient not to take vitamin D at this time she is going to look the dosage she has been taking at home and let us know. Patient urine analysis and culture showing question contamination but with her increased frequency with urination and surgical procedure we will repeat the urine test again before she goes back to see the urogynecologist.    Patient is due for DEXA scan will order the same     Patient Care Team:  Peg Brantley MD as PCP - General (Internal Medicine)  Peg Brantley MD     Review of Systems:     Review of Systems   Constitutional: Negative for chills and fever. HENT: Negative for ear pain and sore throat. Eyes: Negative for pain and visual disturbance. Respiratory: Negative for cough and shortness of breath. Cardiovascular: Negative for chest pain and palpitations. Gastrointestinal: Negative for abdominal pain and vomiting. Genitourinary: Negative for dysuria and hematuria. Musculoskeletal: Negative for arthralgias and back pain. Skin: Negative for color change and rash. Neurological: Negative for seizures and syncope. All other systems reviewed and are negative.        Problem List:     Patient Active Problem List   Diagnosis   • Primary hypertension   • Class 1 obesity due to excess calories in adult   • Dyslipidemia   • Primary osteoarthritis involving multiple joints   • Urinary frequency   • Muscle cramps   • Unsteady gait   • Anxiety and depression   • Other hemorrhoids   • Recurrent major depressive disorder, in full remission ([address])   • Uterovaginal prolapse   • Preoperative clearance   • Urinary incontinence      Past Medical and Surgical History:     Past Medical History:   Diagnosis Date   • Anxiety disorder    • Arthritis    • Bright red rectal bleeding    • Colon polyp    • DJD (degenerative joint disease)    • Hernia A year ago   • High cholesterol    • Hyperactivity of bladder    • Hypertension    • Irritable bowel syndrome Last July   • Macular degeneration    • Obesity    • Osteoporosis      Past Surgical History:   Procedure Laterality Date   • CHOLECYSTECTOMY     • COLONOSCOPY     • DXA PROCEDURE (HISTORICAL)  06/23/2021   • INTRAOCULAR LENS INSERTION     • MAMMO (HISTORICAL)  01/04/2022   • TOTAL SHOULDER REPLACEMENT Left       Family History:     Family History   Problem Relation Age of Onset   • Hearing loss Mother    • Esophageal cancer Father    • Hypertension Sister    • No Known Problems Sister    • Esophageal cancer Maternal Grandmother    • Diabetes Paternal Grandmother    • Breast cancer Maternal Aunt    • No Known Problems Maternal Aunt    • No Known Problems Maternal Aunt    • No Known Problems Maternal Aunt    • Skin cancer Paternal Aunt    • No Known Problems Paternal Aunt    • No Known Problems Paternal Aunt    • Cancer Paternal Aunt    • Breast cancer Cousin       Social History:     Social History     Socioeconomic History   • Marital status: /[employer]     Spouse name: None   • Number of children: None   • Years of education: None   • Highest education level: None   Occupational History   • None   Tobacco Use   • Smoking status: Former     Packs/day: 0.00     Years: 15.00     Total pack years: 0.00     Types: Cigarettes     Passive exposure: Past   • Smokeless tobacco: Never   • Tobacco comments:     quit cigarettes age 33   Vaping Use   • Vaping Use: Never used   Substance and Sexual Activity   • Alcohol use: No   • Drug use: Never   • Sexual activity: Not Currently     Partners: Male     Birth control/protection: Male Sterilization   Other Topics Concern   • None   Social History Narrative   • None     Social Determinants of Health     Financial Resource Strain: Low Risk  (10/10/2023)    Overall Financial Resource Strain (CARDIA)    • Difficulty of Paying Living Expenses: Not very hard   Food Insecurity: Not on file   Transportation Needs: No Transportation Needs (10/10/2023)    PRAPARE - Transportation    • Lack of Transportation (Medical): No    • Lack of Transportation (Non-Medical):  No   Physical Activity: Not on file   Stress: Not on file   Social Connections: Not on file   Intimate Partner Violence: Not on file   Housing Stability: Not on file      Medications and Allergies:     Current Outpatient Medications   Medication Sig Dispense Refill   • ALPRAZolam (XANAX) 0.25 mg tablet Take 1 tablet (0.25 mg total) by mouth 2 (two) times a day as needed for anxiety 40 tablet 0   • aspirin (ECOTRIN LOW STRENGTH) 81 mg EC tablet Take 81 mg by mouth daily     • Biotin 1000 MCG tablet Take 1,000 mcg by mouth 3 (three) times a day     • cholecalciferol (VITAMIN D3) 1,000 units tablet Vitamin D TABS   Refills: 0    Active     • Cinnamon 500 MG capsule Cinnamon CAPS   Refills: 0    Active     • Cranberry 200 MG CAPS Take by mouth in the morning     • docusate sodium (COLACE) 50 mg capsule Take by mouth     • Flaxseed, Linseed, (FLAX SEED OIL) 1000 MG CAPS Flax Seed Oil 1000 MG Oral Capsule   Refills: 0    Active     • fluticasone (FLONASE) 50 mcg/act nasal spray 2 sprays into each nostril daily 11.1 mL 1   • Multiple Vitamins-Minerals (PRESERVISION AREDS 2 PO) Take by mouth     • Omega-3 Fatty Acids (FISH OIL) 645 MG CAPS Fish Oil CAPS   Refills: 0    Active     • Potassium 95 MG TABS Potassium TABS   Refills: 0    Active     • telmisartan (MICARDIS) 40 mg tablet Take 1 tablet (40 mg total) by mouth daily 90 tablet 1     No current facility-administered medications for this visit. Allergies   Allergen Reactions   • Zithromax [Azithromycin] Swelling   • Atorvastatin Other (See Comments)     legs get stiff   • Erythromycin Base Palpitations      Immunizations:     Immunization History   Administered Date(s) Administered   • COVID-19 PFIZER VACCINE 0.3 ML IM 02/14/2021, 03/04/2021   • INFLUENZA 10/20/2022   • Influenza, high dose seasonal 0.7 mL 10/20/2022, 09/28/2023   • Tdap 11/23/2013, 06/28/2019   • Zoster Vaccine Recombinant 04/16/2019, 09/07/2019      Health Maintenance:         Topic Date Due   • Hepatitis C Screening  Never done   • Colorectal Cancer Screening  08/21/2026         Topic Date Due   • Pneumococcal Vaccine: 65+ Years (1 - PCV) Never done   • COVID-19 Vaccine (3 - Pfizer series) 04/29/2021      Medicare Screening Tests and Risk Assessments:     [facility] is here for her Subsequent Wellness visit. Last Medicare Wellness visit information reviewed, patient interviewed and updates made to the record today. Health Risk Assessment:   Patient rates overall health as good. Patient feels that their physical health rating is same. Patient is satisfied with their life. Eyesight was rated as slightly worse. Hearing was rated as slightly worse. Patient feels that their emotional and mental health rating is same. Patients states they are sometimes angry. Patient states they are often unusually tired/fatigued.  Pain experienced in the last 7 days has been none. Patient states that she has experienced no weight loss or gain in last 6 months. Satisfied with life    Depression Screening:   PHQ-9 Score: 4      Fall Risk Screening: In the past year, patient has experienced: no history of falling in past year      Urinary Incontinence Screening:   Patient has leaked urine accidently in the last six months. Day and night    Home Safety:  Patient does not have trouble with stairs inside or outside of their home. Patient has working smoke alarms and has working carbon monoxide detector. Home safety hazards include: none. Home is very safe    Nutrition:   Current diet is Regular. Cooks for every one    Medications:   Patient is not currently taking any over-the-counter supplements. Patient is able to manage medications. Activities of Daily Living (ADLs)/Instrumental Activities of Daily Living (IADLs):   Walk and transfer into and out of bed and chair?: Yes  Dress and groom yourself?: Yes    Bathe or shower yourself?: Yes    Feed yourself? Yes  Do your laundry/housekeeping?: Yes  Manage your money, pay your bills and track your expenses?: Yes  Make your own meals?: Yes    Do your own shopping?: Yes    Previous Hospitalizations:   Any hospitalizations or ED visits within the last 12 months?: No      Advance Care Planning:   Living will: No    Durable POA for healthcare: No    Advanced directive: No    Advanced directive counseling given: Yes    ACP document given: Yes    Patient declined ACP directive: No    End of Life Decisions reviewed with patient: Yes    Provider agrees with end of life decisions: Yes      Comments: Discussed with patient regarding end-of-life care decisions and I have given her the papers regarding advance care planning she is going to review with her family and bring back the papers.     Cognitive Screening:   Provider or family/friend/caregiver concerned regarding cognition?: No    PREVENTIVE SCREENINGS      Cardiovascular Screening: General: Screening Current      Diabetes Screening:     General: Screening Current      Colorectal Cancer Screening:     General: Screening Current      Breast Cancer Screening:     General: Screening Current      Cervical Cancer Screening:    General: Screening Not Indicated      Osteoporosis Screening:    General: Risks and Benefits Discussed    Due for: Bone Density Ultrasound      Lung Cancer Screening:     General: Screening Not Indicated      Hepatitis C Screening:    General: Screening Not Indicated    Screening, Brief Intervention, and Referral to Treatment (SBIRT)    Screening  Typical number of drinks in a day: 0  Typical number of drinks in a week: 0  Interpretation: Low risk drinking behavior. AUDIT-C Screenin) How often did you have a drink containing alcohol in the past year? never  2) How many drinks did you have on a typical day when you were drinking in the past year? 0  3) How often did you have 6 or more drinks on one occasion in the past year? never    AUDIT-C Score: 0  Interpretation: Score 0-2 (female): Negative screen for alcohol misuse    Brief Intervention  Alcohol & drug use screenings were reviewed. No concerns regarding substance use disorder identified. Other Counseling Topics:   Car/seat belt/driving safety and calcium and vitamin D intake. No results found. Physical Exam:     /70 (BP Location: Right arm, Patient Position: Sitting, Cuff Size: Standard)   Pulse 75   Ht 5' 2" (1.575 m)   Wt 83.2 kg (183 lb 6.4 oz)   SpO2 98%   BMI 33.54 kg/m²     Physical Exam  Vitals and nursing note reviewed. Constitutional:       General: She is not in acute distress. Appearance: She is well-developed. She is obese. HENT:      Head: Normocephalic and atraumatic. Eyes:      Conjunctiva/sclera: Conjunctivae normal.   Cardiovascular:      Rate and Rhythm: Normal rate and regular rhythm. Heart sounds: No murmur heard.   Pulmonary:      Effort: Pulmonary effort is normal. No respiratory distress. Breath sounds: Normal breath sounds. Abdominal:      Palpations: Abdomen is soft. Tenderness: There is no abdominal tenderness. Musculoskeletal:         General: No swelling. Cervical back: Neck supple. Skin:     General: Skin is warm and dry. Capillary Refill: Capillary refill takes less than 2 seconds. Neurological:      Mental Status: She is alert.    Psychiatric:         Mood and Affect: Mood normal.          Tammy De La Rosa MD

## 2023-10-11 ENCOUNTER — TELEPHONE (OUTPATIENT)
Dept: FAMILY MEDICINE CLINIC | Facility: CLINIC | Age: 78
End: 2023-10-11

## 2023-10-18 ENCOUNTER — APPOINTMENT (OUTPATIENT)
Dept: LAB | Facility: CLINIC | Age: 78
End: 2023-10-18
Payer: MEDICARE

## 2023-10-18 DIAGNOSIS — R35.0 URINARY FREQUENCY: ICD-10-CM

## 2023-10-18 LAB
AMORPH URATE CRY URNS QL MICRO: ABNORMAL
BACTERIA UR QL AUTO: ABNORMAL /HPF
BILIRUB UR QL STRIP: NEGATIVE
CAOX CRY URNS QL MICRO: ABNORMAL /HPF
CLARITY UR: ABNORMAL
COLOR UR: YELLOW
GLUCOSE UR STRIP-MCNC: NEGATIVE MG/DL
HGB UR QL STRIP.AUTO: NEGATIVE
HYALINE CASTS #/AREA URNS LPF: ABNORMAL /LPF
KETONES UR STRIP-MCNC: NEGATIVE MG/DL
LEUKOCYTE ESTERASE UR QL STRIP: ABNORMAL
MUCOUS THREADS UR QL AUTO: ABNORMAL
NITRITE UR QL STRIP: NEGATIVE
NON-SQ EPI CELLS URNS QL MICRO: ABNORMAL /HPF
PH UR STRIP.AUTO: 8 [PH]
PROT UR STRIP-MCNC: ABNORMAL MG/DL
RBC #/AREA URNS AUTO: ABNORMAL /HPF
SP GR UR STRIP.AUTO: 1.02 (ref 1–1.03)
UROBILINOGEN UR STRIP-ACNC: 2 MG/DL
WBC #/AREA URNS AUTO: ABNORMAL /HPF

## 2023-10-18 PROCEDURE — 81001 URINALYSIS AUTO W/SCOPE: CPT

## 2023-10-18 PROCEDURE — 87086 URINE CULTURE/COLONY COUNT: CPT

## 2023-10-19 ENCOUNTER — TELEPHONE (OUTPATIENT)
Dept: FAMILY MEDICINE CLINIC | Facility: CLINIC | Age: 78
End: 2023-10-19

## 2023-10-19 DIAGNOSIS — N39.0 URINARY TRACT INFECTION WITHOUT HEMATURIA, SITE UNSPECIFIED: Primary | ICD-10-CM

## 2023-10-19 RX ORDER — SULFAMETHOXAZOLE AND TRIMETHOPRIM 800; 160 MG/1; MG/1
1 TABLET ORAL 2 TIMES DAILY
Qty: 10 TABLET | Refills: 0 | Status: SHIPPED | OUTPATIENT
Start: 2023-10-19 | End: 2023-10-24

## 2023-10-19 NOTE — PROGRESS NOTES
Patient with symptoms of urinary tract infection urine analysis is abnormal culture pending but patient is having increasing discomfort we will start the patient on Bactrim follow-up urine culture if necessary change the medication.

## 2023-10-20 LAB — BACTERIA UR CULT: NORMAL

## 2023-10-23 ENCOUNTER — EVALUATION (OUTPATIENT)
Facility: CLINIC | Age: 78
End: 2023-10-23
Payer: MEDICARE

## 2023-10-23 DIAGNOSIS — R26.89 OTHER ABNORMALITIES OF GAIT AND MOBILITY: ICD-10-CM

## 2023-10-23 DIAGNOSIS — R26.89 BALANCE DISORDER: Primary | ICD-10-CM

## 2023-10-23 PROCEDURE — 97162 PT EVAL MOD COMPLEX 30 MIN: CPT | Performed by: PHYSICAL THERAPIST

## 2023-10-23 NOTE — PROGRESS NOTES
PT Evaluation          POC expires Auth Status Total   Visits  Start date  Expiration date PT/OT + Visit Limit? Co-Insurance   23 N/A N/A N/A N/A PT, No Yes and $0 Co-pay                                           Visit/Unit Tracking  AUTH Status: N/A Date               Visits  Authed: N/A Used                Remaining                           Today's date: 10/23/2023  Patient name: Ada Palacios  : 1945  MRN: [id number]  Referring provider: Harlan Orellana MD  Dx:   Encounter Diagnosis     ICD-10-CM    1. Balance disorder  R26.89       2. Other abnormalities of gait and mobility  R26.89               Assessment  Assessment details: Patient is a 69 y.o. Female who reports to skilled outpatient PT for a 3 month follow up after discharge from PT. She displayed slight progressions and regressions in her LE strength, balance, safety, endurance, and stability. She remains with great difficulty performing high level balance tasks and dual tasking resulting in HIGH risk for falls per cut off scores taken from APTA and Rehab Measures. Increased visual dependency with balance tasks as indicated by results of mCTSIB compared to previous re-evaluations. Coordination screen was unremarkable. Due to patient's busy schedule, PT educated the patient to perform a HEP for the next month with plan to follow up and re-evaluate to ensure maximal independence and safety and she was in good verbal understanding and agreement to transition to PT if regression observed. Impairments: Abnormal gait, Activity intolerance, Impaired balance, Impaired physical strength, Poor posture and Weight-bearing intolerance  Understanding of Dx/Px/POC: Good  Prognosis: Good    Patient verbalized understanding of POC. Please contact me if you have any questions or recommendations.  Thank you for the referral and the opportunity to share in [facility] care.        Plan  Plan details: Strength training, endurance training, gait training, balance  Patient would benefit from: Skilled PT  Planned modality interventions: Cryotherapy  Planned therapy interventions: Balance, Balance/WB training, Body mechanics training, Functional ROM exercises, Gait training, Neuromuscular re-education, Patient education, Postural training, Strengthening, Stretching, Therapeutic activities, Therapeutic exercises, Therapeutic training, Transfer training and Activity modification  Frequency: 2x/wk  Duration in weeks: 12  Plan of Care beginning date: 10-23-23  Plan of Care expiration date: 12 weeks - 1-  Treatment plan discussed with: Patient       Goals  Short Term Goals (4 weeks):    - Patient will improve time on TUG by 2.9 seconds to facilitate improved safety in all ambulation  - Patient will be independent in basic HEP 2-3 weeks  - Patient will improve 5xSTS score by 2.3 seconds to promote improved LE functional strength needed for ADLs    Long Term Goals (12 weeks):  - Patient will be independent in a comprehensive home exercise program  - Patient will improve scoring on DGI by 2.6 points to progress safety  - Patient will improve gait speed by 0.18 m/s to improve safety with community ambulation  - Patient will improve scoring on FGA by 4 points from 17/30 to 21/30 to progress safety with dynamic tasks  - Patient will be able to demonstrate HT in gait without veering  - Patient will improve 6 Minute Walk Test score by 190 feet to promote improved cardiovascular endurance  - Patient will report 50% reduction in near falls in order to improve safety with functional tasks and reduce his risk for falls  - Patient will report going on walks at least 3 days per week to promote independence and improved cardiovascular endurance  - Patient will be able to ascend/descend stairs reciprocally with 1 UE assist to promote independence and safety with ADLs  - Patient will report 50% reduction in near falls when ambulating on uneven terrain      Cut off score    All date taken from APTA Neuro Section or Rehab Measures      [place]/56  MDC: 6 pts  Age Norms:  57-79: M - 54   F - 55  70-79: M - 47   F - 53  80-89: M - 48   F - 50 5xSTS: Anastasiia et al 2010  MDC: 2.3 sec  Age Norms:  60-69: 11.1 sec  70-79: 12.6 sec  80-89: 14.8 sec   TUG  MDC: 4.14 sec  Cut off score:  >13.5 sec community dwelling adults  >32.2 frail elderly  <20 I for basic transfers  >30 dependent on transfers 10 Meter Walk Test: William Pichardo and Bryn castillo 2011  MDC: 0.18 m/s  20-29: M - 1.35 m   F - 1.34 m  30-39: M - 1.43 m   F - 1.34 m  40-49: M - 1.43 m   F - 1.39 m  50-59: M - 1.43 m   F - 1.31 m  60-69: M - 1.34 m   F - 1.24 m  70-79: M - 1.26 m   F - 1.13 m  80-89: M - 0.97 m   F - 0.94 m    Household Ambulator < 0.4 m/s  Limited Community Ambulator 0.4 - 0.8 m/s  [employer] Ambulator 0.8 - 1.2 m/s  Safely cross the street > 1.2 m/s   FGA  MCID: 4 pts  Geriatrics/community < 22/30 fall risk  Geriatrics/community < 20/30 unexplained falls    DGI  MDC: vestibular - 4 pts  MDC: geriatric/community - 3 pts  Falls risk <19/24 mCTSIB  Norm: 20-60 yrs  Eyes open firm: norm sway 0.21-0.48  Eyes closed firm: norm sway 0.48-0.99  Eyes open foam: norm sway 0.38-0.71  Eyes closed foam: norm sway 0.70-2.22   6 Minute Walk Test  MDC: 190.98 ft  MCID: 164 ft    Age Norms  57-79: M - 1876 ft (571.80 m)  F - 1765 ft (537.98 m)  70-79: M - 1729 ft (527.00 m)  F - 1545 ft (470.92 m)  80-89: M - 1368 ft (416.97 m)  F - 1286 ft (391.97 m) ABC: [address],   <67% increased risk for falls         Subjective    History of Present Illness  - Mechanism of injury: Patient presents to therapy with reports of unsteadiness and imbalance while on feet. She states that she noticed increase in staggering while walking starting ~5 years ago and has since then had a fear of falling.  Patient states her fear of falling keep her from performing functional activities such as negotiating stairs without upper extremity support. She has since made modifications to her life and participation to account for her intolerance to activities such as prolonged standing, walking and stair negotiation. Update (2023): Patient reports that she feels stronger but remains apprehensive in regards to her balance. Update (2023): Patient reports that she feels she has been able to perform all ADLs around her house without much difficulty lately. IE (10-): Patient reports no new falls. She stated that she has started her L eye injections for her macular degeneration that has been going okay. She further stated that she is finishing a course of antibiotics for her recent UTI. She noted that her schedule has been really busy with a lot of doctor's appointments between her and her  over the last month and upcoming. Patient is following up with physician regarding "the thing they found on my brain in May" at the end of November.     - Primary AD: none will use shopping cart in store  - Assist level at home:  helps with ADLs minimal use of stairs  - Decreased fine motor tasks: No      Pain  - Current pain ratin/10  - At best pain ratin/10  - At worst pain ratin/10  - Location: NA  - Aggravating factors: NA    Social Support  - Steps to enter house: small flight ~ 5 steps,w/ HR  - Stairs in house: 1 flight to upstairs, w/ HR  - Lives in: multivele-house  - Lives with:     - Employment status: retired,  cook  - Hand dominance: R    Treatments  - Previous treatment: PT  - Current treatment: medication  - Diagnostic Testing: MRI: negative       Objective     LE MMT  - R Hip Flexion: 4-/5  L Hip Flexion: 4-/5  - R Hip Extension: 4-/5 L Hip Extension: 4-/5  - R Hip Abduction: 4/5  L Hip Abduction: 4/5  - R Hip Adduction: 4+/5 L Hip Adduction: 4+/5  - R Knee Extension: 4+/5 L Knee Extension: 4+/5  - R Knee Flexion: 4/5  L Knee Flexion: 4/5  - R Ankle DF: 4-/5  L Ankle DF: 4-/5  - R Ankle PF: 4/5  L Ankle PF: 4/5    Sensation  - Light touch: normal  - Deep pressure: normal      Coordination  - Heel to Shin: Decreased height on LE lift B/L  - Alternate Toe Taps: normal    Reflexes/Clonus  - Clonus: No,   - Patellar DTR: 1+: Diminished B/L    Myelopathy Screen (>3/5 +)  - Paul's Reflex: -  - Babinski Reflex: not asssed  - Inverted Supinator Sign: -  - Age > 45: Yes  - Gait Deviation: No    Gait  - Abnormalities: Increased trunk sway, decreased stance time on L, decreased foot clearance, increased L hip hike           Outcome Measures Initial Eval  5-1-23 PN  6-14-23 PN  7-17-23 IE  10-     5xSTS 12.30 sec 11.47 sec,   0 UE 10.59 sec, 0 UE 9.33 sec, 0 UE     TUG  - Regular  - Cognitive   15.98 sec  19.36 sec   10.06 sec  11.74 sec   9.69 sec  13.38 sec   10.91 sec  12.85 sec (stopped counting)     10 meter 0.97 m/s 1.18 m/s 1.15 m/s 1.22 m/s     FGA 17/30 20/30 23/30 22/30     DGI 15/24 19/24 20/24 20/24     mCTSIB  - FTEO (firm)  - FTEC (firm)  - FTEO (foam)  - FTEC (foam)   30 sec  30 sec (+)  10.50 sec  3.72 sec   30 sec  30 sec (+)  30 sec  3 sec   30 sec  30 sec (+)  30 sec  3 sec   30 sec  7.5 sec  30 sec  1.5 sec     6MWT 1040 ft  1125 ft 1050 ft, 0 AD                                  Precautions: Macular Degeneration  Past Medical History:   Diagnosis Date    Anxiety disorder     Arthritis     Bright red rectal bleeding     Colon polyp     DJD (degenerative joint disease)     Hernia A year ago    High cholesterol     Hyperactivity of bladder     Hypertension     Irritable bowel syndrome Last July    Macular degeneration     Obesity     Osteoporosis

## 2023-10-26 ENCOUNTER — TELEPHONE (OUTPATIENT)
Dept: FAMILY MEDICINE CLINIC | Facility: CLINIC | Age: 78
End: 2023-10-26

## 2023-10-27 ENCOUNTER — OFFICE VISIT (OUTPATIENT)
Dept: FAMILY MEDICINE CLINIC | Facility: CLINIC | Age: 78
End: 2023-10-27
Payer: MEDICARE

## 2023-10-27 ENCOUNTER — APPOINTMENT (OUTPATIENT)
Dept: LAB | Facility: CLINIC | Age: 78
End: 2023-10-27
Payer: MEDICARE

## 2023-10-27 VITALS
HEIGHT: 62 IN | BODY MASS INDEX: 33.49 KG/M2 | WEIGHT: 182 LBS | SYSTOLIC BLOOD PRESSURE: 106 MMHG | DIASTOLIC BLOOD PRESSURE: 66 MMHG | OXYGEN SATURATION: 99 % | HEART RATE: 78 BPM

## 2023-10-27 DIAGNOSIS — R26.81 UNSTEADY GAIT: ICD-10-CM

## 2023-10-27 DIAGNOSIS — F32.A ANXIETY AND DEPRESSION: ICD-10-CM

## 2023-10-27 DIAGNOSIS — R35.0 URINARY FREQUENCY: ICD-10-CM

## 2023-10-27 DIAGNOSIS — E66.09 CLASS 1 OBESITY DUE TO EXCESS CALORIES WITH SERIOUS COMORBIDITY AND BODY MASS INDEX (BMI) OF 34.0 TO 34.9 IN ADULT: ICD-10-CM

## 2023-10-27 DIAGNOSIS — E78.5 DYSLIPIDEMIA: ICD-10-CM

## 2023-10-27 DIAGNOSIS — I10 PRIMARY HYPERTENSION: ICD-10-CM

## 2023-10-27 DIAGNOSIS — N81.4 UTEROVAGINAL PROLAPSE: ICD-10-CM

## 2023-10-27 DIAGNOSIS — R55 NEAR SYNCOPE: Primary | ICD-10-CM

## 2023-10-27 DIAGNOSIS — F41.9 ANXIETY AND DEPRESSION: ICD-10-CM

## 2023-10-27 LAB
BACTERIA UR QL AUTO: ABNORMAL /HPF
BILIRUB UR QL STRIP: NEGATIVE
CAOX CRY URNS QL MICRO: ABNORMAL /HPF
CLARITY UR: CLEAR
COLOR UR: ABNORMAL
GLUCOSE UR STRIP-MCNC: NEGATIVE MG/DL
HGB UR QL STRIP.AUTO: NEGATIVE
HYALINE CASTS #/AREA URNS LPF: ABNORMAL /LPF
KETONES UR STRIP-MCNC: NEGATIVE MG/DL
LEUKOCYTE ESTERASE UR QL STRIP: NEGATIVE
MUCOUS THREADS UR QL AUTO: ABNORMAL
NITRITE UR QL STRIP: NEGATIVE
NON-SQ EPI CELLS URNS QL MICRO: ABNORMAL /HPF
PH UR STRIP.AUTO: 7.5 [PH]
PROT UR STRIP-MCNC: ABNORMAL MG/DL
RBC #/AREA URNS AUTO: ABNORMAL /HPF
SP GR UR STRIP.AUTO: 1.02 (ref 1–1.03)
UROBILINOGEN UR STRIP-ACNC: <2 MG/DL
WBC #/AREA URNS AUTO: ABNORMAL /HPF

## 2023-10-27 PROCEDURE — 81001 URINALYSIS AUTO W/SCOPE: CPT

## 2023-10-27 PROCEDURE — 99214 OFFICE O/P EST MOD 30 MIN: CPT | Performed by: INTERNAL MEDICINE

## 2023-10-27 NOTE — ASSESSMENT & PLAN NOTE
Patient takes Xanax occasionally for anxiety symptoms which she had developed in the recent past couple of times she has to take it after she has these near syncope episodes.

## 2023-10-27 NOTE — ASSESSMENT & PLAN NOTE
Patient's urine examination was abnormal culture had shown contaminants we will repeat the urine test recommend follow-up with the gynecologist also.

## 2023-10-27 NOTE — ASSESSMENT & PLAN NOTE
Says blood pressure is running on the lower side we will stop the telmisartan she is going to check her blood pressures at home and bring the machine.

## 2023-10-27 NOTE — PROGRESS NOTES
Office Visit Note  10/27/23     Alexsandra Painter 69 y.o. female MRN: [id number]  : 1945    Assessment:     1. Near syncope  Assessment & Plan:  As described in history of present illness near syncope feeling with no associated symptoms lasting for 5 minutes but generalized weakness was present it appears most likely secondary to low blood pressure readings currently she is taking telmisartan 40 mg daily and there was mild orthostatic changes also laying down 106/66 standing up almost 96/60 we will stop the telmisartan. Exam is otherwise unremarkable no irregular heartbeat heart rate around 70/min no carotid bruits no nystagmus. 2. Primary hypertension  Assessment & Plan:  Says blood pressure is running on the lower side we will stop the telmisartan she is going to check her blood pressures at home and bring the machine. 3. Urinary frequency  Assessment & Plan:  Patient's urine examination was abnormal culture had shown contaminants we will repeat the urine test recommend follow-up with the gynecologist also. 4. Anxiety and depression  Assessment & Plan:  Patient takes Xanax occasionally for anxiety symptoms which she had developed in the recent past couple of times she has to take it after she has these near syncope episodes. 5. Class 1 obesity due to excess calories with serious comorbidity and body mass index (BMI) of 34.0 to 34.9 in adult  Assessment & Plan:  BMI 33.29 emphasized regarding diet cutting back carbohydrates and calorie intake and lifestyle modification      6. Dyslipidemia  Assessment & Plan:  Lipid profile reviewed again recommend very strict diet      7. Uterovaginal prolapse  Assessment & Plan:  Status post surgery for prolapse of the uterovaginal area stable follow-up with the urogynecologist      8. Unsteady gait  Assessment & Plan:  [facility]                 Discussion Summary and Plan:   Today's care plan and medications were reviewed with patient in detail and all their questions answered to their satisfaction. Chief Complaint   Patient presents with    Dizziness      Subjective:  Patient has coming here for a follow-up evaluation with regards to symptoms of generalized weakness lightheadedness unsteady gait episode lasting for about 5 minutes and it has happened 2 times. When this happens she sits down and she feels better after some time. She denies any symptoms of chest pains palpitations shortness of breath ringing in the years double vision blurred vision headache or any other associated symptoms. I spoke to her last night and she has been checking her blood pressures and it was running on the lower side at home 97/60. Patient has taken the blood pressure medication this morning. Medications reviewed labs reviewed patient had urinary findings which were abnormal question UTI however she has history of increased frequency with urination for a long. Of time when she recently had pelvic surgery lifting the bladder by the urogynecologist.        The following portions of the patient's history were reviewed and updated as appropriate: allergies, current medications, past family history, past medical history, past social history, past surgical history and problem list.    Review of Systems   Constitutional:  Negative for chills and fever. HENT:  Negative for ear pain and sore throat. Eyes:  Negative for pain and visual disturbance. Respiratory:  Negative for cough and shortness of breath. Cardiovascular:  Negative for chest pain and palpitations. Gastrointestinal:  Negative for abdominal pain and vomiting. Genitourinary:  Negative for dysuria and hematuria. Musculoskeletal:  Negative for arthralgias and back pain. Skin:  Negative for color change and rash. Neurological:  Positive for light-headedness. Negative for seizures and syncope. All other systems reviewed and are negative.         Historical Information   Patient Active Problem List   Diagnosis    Primary hypertension    Class 1 obesity due to excess calories in adult    Dyslipidemia    Primary osteoarthritis involving multiple joints    Urinary frequency    Muscle cramps    Unsteady gait    Anxiety and depression    Other hemorrhoids    Recurrent major depressive disorder, in full remission ([address])    Uterovaginal prolapse    Preoperative clearance    Urinary incontinence    Near syncope     Past Medical History:   Diagnosis Date    Anxiety disorder     Arthritis     Bright red rectal bleeding     Colon polyp     DJD (degenerative joint disease)     Hernia A year ago    High cholesterol     Hyperactivity of bladder     Hypertension     Irritable bowel syndrome Last July    Macular degeneration     Obesity     Osteoporosis      Past Surgical History:   Procedure Laterality Date    CHOLECYSTECTOMY      COLONOSCOPY      DXA PROCEDURE (HISTORICAL)  06/23/2021    INTRAOCULAR LENS INSERTION      MAMMO (HISTORICAL)  01/04/2022    TOTAL SHOULDER REPLACEMENT Left      Social History     Substance and Sexual Activity   Alcohol Use No     Social History     Substance and Sexual Activity   Drug Use Never     Social History     Tobacco Use   Smoking Status Former    Packs/day: 0.00    Years: 15.00    Total pack years: 0.00    Types: Cigarettes    Passive exposure: Past   Smokeless Tobacco Never   Tobacco Comments    quit cigarettes age 33     Family History   Problem Relation Age of Onset    Hearing loss Mother     Esophageal cancer Father     Hypertension Sister     No Known Problems Sister     Esophageal cancer Maternal Grandmother     Diabetes Paternal Grandmother     Breast cancer Maternal Aunt     No Known Problems Maternal Aunt     No Known Problems Maternal Aunt     No Known Problems Maternal Aunt     Skin cancer Paternal Aunt     No Known Problems Paternal Aunt     No Known Problems Paternal Aunt     Cancer Paternal Aunt     Breast cancer [facility] Maintenance Due   Topic    Hepatitis C Screening     Pneumococcal Vaccine: 65+ Years (1 - PCV)    COVID-19 Vaccine (3 - Pfizer series)    BMI: Followup Plan       Meds/Allergies       Current Outpatient Medications:     ALPRAZolam (XANAX) 0.25 mg tablet, Take 1 tablet (0.25 mg total) by mouth 2 (two) times a day as needed for anxiety, Disp: 40 tablet, Rfl: 0    aspirin (ECOTRIN LOW STRENGTH) 81 mg EC tablet, Take 81 mg by mouth daily, Disp: , Rfl:     Biotin 1000 MCG tablet, Take 1,000 mcg by mouth 3 (three) times a day, Disp: , Rfl:     Cranberry 200 MG CAPS, Take by mouth in the morning, Disp: , Rfl:     docusate sodium (COLACE) 50 mg capsule, Take by mouth, Disp: , Rfl:     fluticasone (FLONASE) 50 mcg/act nasal spray, 2 sprays into each nostril daily, Disp: 11.1 mL, Rfl: 1    Multiple Vitamins-Minerals (PRESERVISION AREDS 2 PO), Take by mouth, Disp: , Rfl:     telmisartan (MICARDIS) 40 mg tablet, Take 1 tablet (40 mg total) by mouth daily, Disp: 90 tablet, Rfl: 1    Cinnamon 500 MG capsule, Cinnamon CAPS  Refills: 0  Active, Disp: , Rfl:     Flaxseed, Linseed, (FLAX SEED OIL) 1000 MG CAPS, Flax Seed Oil 1000 MG Oral Capsule  Refills: 0  Active, Disp: , Rfl:     Omega-3 Fatty Acids (FISH OIL) 645 MG CAPS, Fish Oil CAPS  Refills: 0  Active, Disp: , Rfl:     Potassium 95 MG TABS, Potassium TABS  Refills: 0  Active, Disp: , Rfl:       Objective:    Vitals:   /66 (BP Location: Right arm, Patient Position: Supine, Cuff Size: Large) Comment: Standing up 96/60  Pulse 78   Ht 5' 2" (1.575 m)   Wt 82.6 kg (182 lb)   SpO2 99%   BMI 33.29 kg/m²   Body mass index is 33.29 kg/m². Vitals:    10/27/23 0824   Weight: 82.6 kg (182 lb)       Physical Exam  Vitals and nursing note reviewed. Constitutional:       Appearance: Normal appearance. Eyes:      Comments: No nystagmus   Neck:      Vascular: No carotid bruit. Cardiovascular:      Rate and Rhythm: Normal rate and regular rhythm.       Heart sounds: Normal heart sounds. Pulmonary:      Effort: Pulmonary effort is normal.      Breath sounds: Normal breath sounds. Abdominal:      General: Abdomen is flat. Palpations: Abdomen is soft. Musculoskeletal:      Cervical back: Normal range of motion and neck supple. Right lower leg: No edema. Left lower leg: No edema. Skin:     General: Skin is dry. Neurological:      Mental Status: She is alert and oriented to person, place, and time.          Lab Review   Appointment on 10/18/2023   Component Date Value Ref Range Status    Color, UA 10/18/2023 Yellow   Final    Clarity, UA 10/18/2023 Extra Turbid   Final    Specific Gravity, UA 10/18/2023 1.023  1.003 - 1.030 Final    pH, UA 10/18/2023 8.0  4.5, 5.0, 5.5, 6.0, 6.5, 7.0, 7.5, 8.0 Final    Leukocytes, UA 10/18/2023 Small (A)  Negative Final    Nitrite, UA 10/18/2023 Negative  Negative Final    Protein, UA 10/18/2023 50 (1+) (A)  Negative mg/dl Final    Glucose, UA 10/18/2023 Negative  Negative mg/dl Final    Ketones, UA 10/18/2023 Negative  Negative mg/dl Final    Urobilinogen, UA 10/18/2023 2.0 (A)  <2.0 mg/dl mg/dl Final    Bilirubin, UA 10/18/2023 Negative  Negative Final    Occult Blood, UA 10/18/2023 Negative  Negative Final    RBC, UA 10/18/2023 2-4 (A)  None Seen, 1-2 /hpf Final    WBC, UA 10/18/2023 Innumerable (A)  None Seen, 1-2 /hpf Final    Epithelial Cells 10/18/2023 Moderate (A)  None Seen, Occasional /hpf Final    Bacteria, UA 10/18/2023 Moderate (A)  None Seen, Occasional /hpf Final    MUCUS THREADS 10/18/2023 Innumerable (A)  None Seen Final    Hyaline Casts, UA 10/18/2023 5-10 (A)  None Seen /lpf Final    Amorphous Crystals, UA 10/18/2023 Occasional   Final    Ca Oxalate Ximena, UA 10/18/2023 Innumerable (A)  None Seen /hpf Final    Urine Culture 10/18/2023 80,000-89,000 cfu/ml   Final    Mixed Contaminants X3   Orders Only on 10/04/2023   Component Date Value Ref Range Status    White Blood Cell Count 10/04/2023 7.9  3.4 - 10.8 x10E3/uL Final    Red Blood Cell Count 10/04/2023 3.90  3.77 - 5.28 x10E6/uL Final    Hemoglobin 10/04/2023 11.8  11.1 - 15.9 g/dL Final    HCT 10/04/2023 36.1  34.0 - 46.6 % Final    MCV 10/04/2023 93  79 - 97 fL Final    MCH 10/04/2023 30.3  26.6 - 33.0 pg Final    MCHC 10/04/2023 32.7  31.5 - 35.7 g/dL Final    RDW 10/04/2023 13.1  11.7 - 15.4 % Final    Platelet Count 67/55/8702 311  150 - 450 x10E3/uL Final    Neutrophils 10/04/2023 61  Not Estab. % Final    Lymphocytes 10/04/2023 24  Not Estab. % Final    Monocytes 10/04/2023 9  Not Estab. % Final    Eosinophils 10/04/2023 5  Not Estab. % Final    Basophils PCT 10/04/2023 1  Not Estab. % Final    Neutrophils (Absolute) 10/04/2023 4.8  1.4 - 7.0 x10E3/uL Final    Lymphocytes (Absolute) 10/04/2023 1.9  0.7 - 3.1 x10E3/uL Final    Monocytes (Absolute) 10/04/2023 0.7  0.1 - 0.9 x10E3/uL Final    Eosinophils (Absolute) 10/04/2023 0.4  0.0 - 0.4 x10E3/uL Final    Basophils ABS 10/04/2023 0.1  0.0 - 0.2 x10E3/uL Final    Immature Granulocytes 10/04/2023 0  Not Estab. % Final    Immature Granulocytes (Absolute) 10/04/2023 0.0  0.0 - 0.1 x10E3/uL Final    Comment: A hand-written panel/profile was received from your office. In  accordance with the [facility] Ambiguous Test Code Policy dated July 7402, we have assigned CBC with Differential/Platelet, Test Code  #[id number] to this request. If this is not the testing you wished to  receive on this specimen, please contact the [facility] Department to clarify the test order. We  appreciate your business.       Glucose, Random 10/04/2023 95  70 - 99 mg/dL Final    BUN 10/04/2023 11  8 - 27 mg/dL Final    Creatinine 10/04/2023 0.83  0.57 - 1.00 mg/dL Final    eGFR 10/04/2023 73  >59 mL/min/1.73 Final    SL AMB BUN/CREATININE RATIO 10/04/2023 13  12 - 28 Final    Sodium 10/04/2023 140  134 - 144 mmol/L Final    Potassium 10/04/2023 4.2  3.5 - 5.2 mmol/L Final    Chloride 10/04/2023 104  96 - 106 mmol/L Final CO2 10/04/2023 24  20 - 29 mmol/L Final    CALCIUM 10/04/2023 8.9  8.7 - 10.3 mg/dL Final    Protein, Total 10/04/2023 6.0  6.0 - 8.5 g/dL Final    Albumin 10/04/2023 3.7 (L)  3.8 - 4.8 g/dL Final    Globulin, Total 10/04/2023 2.3  1.5 - 4.5 g/dL Final    Albumin/Globulin Ratio 10/04/2023 1.6  1.2 - 2.2 Final    TOTAL BILIRUBIN 10/04/2023 0.3  0.0 - 1.2 mg/dL Final    Alk Phos Isoenzymes 10/04/2023 86  44 - 121 IU/L Final    AST 10/04/2023 11  0 - 40 IU/L Final    ALT 10/04/2023 11  0 - 32 IU/L Final    Specific Gravity 10/04/2023 1.017  1.005 - 1.030 Final    Ph 10/04/2023 7.5  5.0 - 7.5 Final    Color UA 10/04/2023 Yellow  Yellow Final    Urine Appearance 10/04/2023 Cloudy (A)  Clear Final    Leukocyte Esterase 10/04/2023 1+ (A)  Negative Final    Protein 10/04/2023 Negative  Negative/Trace Final    Glucose, 24 HR Urine 10/04/2023 Negative  Negative Final    Ketone, Urine 10/04/2023 Negative  Negative Final    Blood, Urine 10/04/2023 Negative  Negative Final    Bilirubin, Urine 10/04/2023 Negative  Negative Final    Urobilinogen Urine 10/04/2023 0.2  0.2 - 1.0 mg/dL Final    Nitrites Urine 10/04/2023 Negative  Negative Final    Microscopic Examination 10/04/2023 See below:   Final    Microscopic was indicated and was performed. Urinalysis Reflex 10/04/2023 Comment   Final    This specimen has reflexed to a Urine Culture. SL AMB WBC, URINE 10/04/2023 11-30 (A)  0 - 5 /hpf Final    RBC, Urine 10/04/2023 0-2  0 - 2 /hpf Final    Epithelial Cells (non renal) 10/04/2023 0-10  0 - 10 /hpf Final    Casts 10/04/2023 None seen  None seen /lpf Final    Bacteria, Urine 10/04/2023 Few  None seen/Few Final    Urine Culture, Comprehensive 10/04/2023 Final report   Final    Result 1 10/04/2023 Comment   Final    Comment: More than 3 organisms recovered, none predominant. Please submit  another culture if clinically indicated.   25,000-50,000 colony forming units per mL      Cholesterol, Total 10/04/2023 196  100 - 199 mg/dL Final    Triglycerides 10/04/2023 91  0 - 149 mg/dL Final    HDL 10/04/2023 64  >39 mg/dL Final    VLDL Cholesterol Calculated 10/04/2023 16  5 - 40 mg/dL Final    LDL Calculated 10/04/2023 116 (H)  0 - 99 mg/dL Final    Hemoglobin A1C 10/04/2023 5.5  4.8 - 5.6 % Final    Comment:          Prediabetes: 5.7 - 6.4           Diabetes: >6.4           Glycemic control for adults with diabetes: <7.0      25-HYDROXY VIT D 10/04/2023 99.1  30.0 - 100.0 ng/mL Final    Comment: Vitamin D deficiency has been defined by the [place] of  Medicine and an Endocrine Society practice guideline as a  level of serum 25-OH vitamin D less than 20 ng/mL (1,2). The Endocrine Society went on to further define vitamin D  insufficiency as a level between 21 and 29 ng/mL (2). 1. IOM ([place] of [employer]). 2010. Dietary reference     intakes for calcium and D. [employer]: The     [employer]. 2. Anthony MF, Aydee LEE, Windy MARIN, et al.     Evaluation, treatment, and prevention of vitamin D     deficiency: an Endocrine Society clinical practice     guideline. JCEM. 2011 Jul; 96(7):1911-30. TSH 10/04/2023 1.470  0.450 - 4.500 uIU/mL Final         Leona Looney MD        "This note has been constructed using a voice recognition system. Therefore there may be syntax, spelling, and/or grammatical errors.  Please call if you have any questions. "

## 2023-10-27 NOTE — ASSESSMENT & PLAN NOTE
As described in history of present illness near syncope feeling with no associated symptoms lasting for 5 minutes but generalized weakness was present it appears most likely secondary to low blood pressure readings currently she is taking telmisartan 40 mg daily and there was mild orthostatic changes also laying down 106/66 standing up almost 96/60 we will stop the telmisartan. Exam is otherwise unremarkable no irregular heartbeat heart rate around 70/min no carotid bruits no nystagmus.

## 2023-10-27 NOTE — ASSESSMENT & PLAN NOTE
BMI 33.29 emphasized regarding diet cutting back carbohydrates and calorie intake and lifestyle modification

## 2023-11-01 ENCOUNTER — TELEPHONE (OUTPATIENT)
Age: 78
End: 2023-11-01

## 2023-11-01 NOTE — TELEPHONE ENCOUNTER
The patient called she had to cancel her 11/8/23 appointment  she has a dentist appointment the same time  she needs to see the doctor I could not find a  open space for her  please advise  thank you

## 2023-11-07 ENCOUNTER — OFFICE VISIT (OUTPATIENT)
Dept: FAMILY MEDICINE CLINIC | Facility: CLINIC | Age: 78
End: 2023-11-07
Payer: MEDICARE

## 2023-11-07 VITALS — HEART RATE: 80 BPM | BODY MASS INDEX: 33.49 KG/M2 | WEIGHT: 182 LBS | HEIGHT: 62 IN | OXYGEN SATURATION: 98 %

## 2023-11-07 DIAGNOSIS — F32.A ANXIETY AND DEPRESSION: ICD-10-CM

## 2023-11-07 DIAGNOSIS — F41.9 ANXIETY AND DEPRESSION: ICD-10-CM

## 2023-11-07 DIAGNOSIS — K64.8 OTHER HEMORRHOIDS: ICD-10-CM

## 2023-11-07 DIAGNOSIS — E66.09 CLASS 1 OBESITY DUE TO EXCESS CALORIES WITH SERIOUS COMORBIDITY AND BODY MASS INDEX (BMI) OF 34.0 TO 34.9 IN ADULT: ICD-10-CM

## 2023-11-07 DIAGNOSIS — E78.5 DYSLIPIDEMIA: ICD-10-CM

## 2023-11-07 DIAGNOSIS — R55 NEAR SYNCOPE: Primary | ICD-10-CM

## 2023-11-07 DIAGNOSIS — R35.0 URINARY FREQUENCY: ICD-10-CM

## 2023-11-07 DIAGNOSIS — I10 PRIMARY HYPERTENSION: ICD-10-CM

## 2023-11-07 PROCEDURE — 99214 OFFICE O/P EST MOD 30 MIN: CPT | Performed by: INTERNAL MEDICINE

## 2023-11-07 NOTE — ASSESSMENT & PLAN NOTE
Lipid profile shows cholesterol at 196 triglycerides 91 HDL 64 and LDL at 116 we will continue with strict diet for now.

## 2023-11-07 NOTE — ASSESSMENT & PLAN NOTE
Patient with near syncope episodes with drop in blood pressure reading we will stop the telmisartan. Patient has been checking the blood pressures at home noticed some orthostatic changes but no symptoms today on exam here with my machine her blood pressure laying down was 126/76 standing up was 96/70. Patient did not have any symptoms at this time. With her own machine we tried to check it but it was showing  error few times but I recommend her to keep monitoring her blood pressures at home.   If necessary we will start her on midodrine

## 2023-11-07 NOTE — ASSESSMENT & PLAN NOTE
Since the blood pressures are running on the lower side we will discontinue the telmisartan completely No

## 2023-11-07 NOTE — ASSESSMENT & PLAN NOTE
Urinary findings discussed with the patient she has an appointment with the urogynecologist tomorrow and she will discuss with her also regarding the findings and follow-up

## 2023-11-07 NOTE — PROGRESS NOTES
Office Visit Note  23     Jose Perdue 69 y.o. female MRN: [id number]  : 1945    Assessment:     1. Near syncope  Assessment & Plan:  Patient with near syncope episodes with drop in blood pressure reading we will stop the telmisartan. Patient has been checking the blood pressures at home noticed some orthostatic changes but no symptoms today on exam here with my machine her blood pressure laying down was 126/76 standing up was 96/70. Patient did not have any symptoms at this time. With her own machine we tried to check it but it was showing  error few times but I recommend her to keep monitoring her blood pressures at home. If necessary we will start her on midodrine      2. Anxiety and depression  Assessment & Plan:  Patient takes Xanax 0.25 mg occasionally      3. Class 1 obesity due to excess calories with serious comorbidity and body mass index (BMI) of 34.0 to 34.9 in adult  Assessment & Plan:  Diet exercise lifestyle modification cutting back carbohydrate intake      4. Dyslipidemia  Assessment & Plan:  Lipid profile shows cholesterol at 196 triglycerides 91 HDL 64 and LDL at 116 we will continue with strict diet for now. 5. Other hemorrhoids    6. Primary hypertension  Assessment & Plan:  Since the blood pressures are running on the lower side we will discontinue the telmisartan completely      7. Urinary frequency  Assessment & Plan:  Urinary findings discussed with the patient she has an appointment with the urogynecologist tomorrow and she will discuss with her also regarding the findings and follow-up                 Discussion Summary and Plan: Today's care plan and medications were reviewed with patient in detail and all their questions answered to their satisfaction. Chief Complaint   Patient presents with    Follow-up      Subjective:  Patient is coming here for a follow-up evaluation with regards to her symptoms of lightheadedness/near syncope episodes.   Last office visit the blood pressure readings where showing orthostatic changes we stopped the her blood pressure medication completely. She has been checking the blood pressures at home and it did show some orthostatic changes but there were no episodes of any lightheadedness or near syncope. She is coming here for a follow-up evaluation for the same. Denies any symptoms of chest pains palpitations dizziness at this time. Reviewed on the blood pressure readings from home        The following portions of the patient's history were reviewed and updated as appropriate: allergies, current medications, past family history, past medical history, past social history, past surgical history and problem list.    Review of Systems   Constitutional:  Negative for chills and fever. HENT:  Negative for ear pain and sore throat. Eyes:  Negative for pain and visual disturbance. Respiratory:  Negative for cough and shortness of breath. Cardiovascular:  Negative for chest pain and palpitations. Gastrointestinal:  Negative for abdominal pain and vomiting. Genitourinary:  Negative for dysuria and hematuria. Musculoskeletal:  Negative for arthralgias and back pain. Skin:  Negative for color change and rash. Neurological:  Negative for seizures and syncope. All other systems reviewed and are negative.         Historical Information   Patient Active Problem List   Diagnosis    Primary hypertension    Class 1 obesity due to excess calories in adult    Dyslipidemia    Primary osteoarthritis involving multiple joints    Urinary frequency    Muscle cramps    Unsteady gait    Anxiety and depression    Other hemorrhoids    Recurrent major depressive disorder, in full remission ([address])    Uterovaginal prolapse    Urinary incontinence    Near syncope     Past Medical History:   Diagnosis Date    Anxiety disorder     Arthritis     Bright red rectal bleeding     Colon polyp     DJD (degenerative joint disease)     Hernia A year ago    High cholesterol     Hyperactivity of bladder     Hypertension     Irritable bowel syndrome Last July    Macular degeneration     Obesity     Osteoporosis      Past Surgical History:   Procedure Laterality Date    CHOLECYSTECTOMY      COLONOSCOPY      DXA PROCEDURE (HISTORICAL)  06/23/2021    INTRAOCULAR LENS INSERTION      MAMMO (HISTORICAL)  01/04/2022    TOTAL SHOULDER REPLACEMENT Left      Social History     Substance and Sexual Activity   Alcohol Use No     Social History     Substance and Sexual Activity   Drug Use Never     Social History     Tobacco Use   Smoking Status Former    Packs/day: 0.00    Years: 15.00    Total pack years: 0.00    Types: Cigarettes    Passive exposure: Past   Smokeless Tobacco Never   Tobacco Comments    quit cigarettes age 33     Family History   Problem Relation Age of Onset    Hearing loss Mother     Esophageal cancer Father     Hypertension Sister     No Known Problems Sister     Esophageal cancer Maternal Grandmother     Diabetes Paternal Grandmother     Breast cancer Maternal Aunt     No Known Problems Maternal Aunt     No Known Problems Maternal Aunt     No Known Problems Maternal Aunt     Skin cancer Paternal Aunt     No Known Problems Paternal Aunt     No Known Problems Paternal Aunt     Cancer Paternal Aunt     Breast cancer Cousin      Health Maintenance Due   Topic    Hepatitis C Screening     Pneumococcal Vaccine: 65+ Years (1 - PCV)    COVID-19 Vaccine (3 - Pfizer series)    BMI: Followup Plan       Meds/Allergies       Current Outpatient Medications:     ALPRAZolam (XANAX) 0.25 mg tablet, Take 1 tablet (0.25 mg total) by mouth 2 (two) times a day as needed for anxiety, Disp: 40 tablet, Rfl: 0    aspirin (ECOTRIN LOW STRENGTH) 81 mg EC tablet, Take 81 mg by mouth daily, Disp: , Rfl:     Biotin 1000 MCG tablet, Take 1,000 mcg by mouth 3 (three) times a day, Disp: , Rfl:     Cranberry 200 MG CAPS, Take by mouth in the morning, Disp: , Rfl:     docusate sodium (COLACE) 50 mg capsule, Take by mouth, Disp: , Rfl:     fluticasone (FLONASE) 50 mcg/act nasal spray, 2 sprays into each nostril daily, Disp: 11.1 mL, Rfl: 1    Multiple Vitamins-Minerals (PRESERVISION AREDS 2 PO), Take by mouth, Disp: , Rfl:     Flaxseed, Linseed, (FLAX SEED OIL) 1000 MG CAPS, Flax Seed Oil 1000 MG Oral Capsule  Refills: 0  Active, Disp: , Rfl:     Omega-3 Fatty Acids (FISH OIL) 645 MG CAPS, Fish Oil CAPS  Refills: 0  Active, Disp: , Rfl:     Potassium 95 MG TABS, Potassium TABS  Refills: 0  Active, Disp: , Rfl:       Objective:    Vitals:   Pulse 80   Ht 5' 2" (1.575 m)   Wt 82.6 kg (182 lb)   SpO2 98%   BMI 33.29 kg/m²   Body mass index is 33.29 kg/m². Vitals:    11/07/23 1058   Weight: 82.6 kg (182 lb)       Physical Exam  Vitals and nursing note reviewed. Constitutional:       Appearance: Normal appearance. Cardiovascular:      Rate and Rhythm: Normal rate and regular rhythm. Heart sounds: Normal heart sounds. Pulmonary:      Effort: Pulmonary effort is normal.      Breath sounds: Normal breath sounds. Abdominal:      Palpations: Abdomen is soft. Musculoskeletal:      Cervical back: Normal range of motion and neck supple. Right lower leg: No edema. Left lower leg: No edema. Skin:     General: Skin is dry. Neurological:      Mental Status: She is alert and oriented to person, place, and time.          Lab Review   Appointment on 10/27/2023   Component Date Value Ref Range Status    Color, UA 10/27/2023 Light Yellow   Final    Clarity, UA 10/27/2023 Clear   Final    Specific Gravity, UA 10/27/2023 1.018  1.003 - 1.030 Final    pH, UA 10/27/2023 7.5  4.5, 5.0, 5.5, 6.0, 6.5, 7.0, 7.5, 8.0 Final    Leukocytes, UA 10/27/2023 Negative  Negative Final    Nitrite, UA 10/27/2023 Negative  Negative Final    Protein, UA 10/27/2023 Trace (A)  Negative mg/dl Final    Glucose, UA 10/27/2023 Negative  Negative mg/dl Final    Ketones, UA 10/27/2023 Negative  Negative mg/dl Final Urobilinogen, UA 10/27/2023 <2.0  <2.0 mg/dl mg/dl Final    Bilirubin, UA 10/27/2023 Negative  Negative Final    Occult Blood, UA 10/27/2023 Negative  Negative Final    RBC, UA 10/27/2023 None Seen  None Seen, 1-2 /hpf Final    WBC, UA 10/27/2023 2-4 (A)  None Seen, 1-2 /hpf Final    Epithelial Cells 10/27/2023 Occasional  None Seen, Occasional /hpf Final    Bacteria, UA 10/27/2023 None Seen  None Seen, Occasional /hpf Final    MUCUS THREADS 10/27/2023 Occasional (A)  None Seen Final    Hyaline Casts, UA 10/27/2023 0-3 (A)  None Seen /lpf Final    Ca Oxalate Ximena, UA 10/27/2023 Innumerable (A)  None Seen /hpf Final   Appointment on 10/18/2023   Component Date Value Ref Range Status    Color, UA 10/18/2023 Yellow   Final    Clarity, UA 10/18/2023 Extra Turbid   Final    Specific Gravity, UA 10/18/2023 1.023  1.003 - 1.030 Final    pH, UA 10/18/2023 8.0  4.5, 5.0, 5.5, 6.0, 6.5, 7.0, 7.5, 8.0 Final    Leukocytes, UA 10/18/2023 Small (A)  Negative Final    Nitrite, UA 10/18/2023 Negative  Negative Final    Protein, UA 10/18/2023 50 (1+) (A)  Negative mg/dl Final    Glucose, UA 10/18/2023 Negative  Negative mg/dl Final    Ketones, UA 10/18/2023 Negative  Negative mg/dl Final    Urobilinogen, UA 10/18/2023 2.0 (A)  <2.0 mg/dl mg/dl Final    Bilirubin, UA 10/18/2023 Negative  Negative Final    Occult Blood, UA 10/18/2023 Negative  Negative Final    RBC, UA 10/18/2023 2-4 (A)  None Seen, 1-2 /hpf Final    WBC, UA 10/18/2023 Innumerable (A)  None Seen, 1-2 /hpf Final    Epithelial Cells 10/18/2023 Moderate (A)  None Seen, Occasional /hpf Final    Bacteria, UA 10/18/2023 Moderate (A)  None Seen, Occasional /hpf Final    MUCUS THREADS 10/18/2023 Innumerable (A)  None Seen Final    Hyaline Casts, UA 10/18/2023 5-10 (A)  None Seen /lpf Final    Amorphous Crystals, UA 10/18/2023 Occasional   Final    Ca Oxalate Ximena, UA 10/18/2023 Innumerable (A)  None Seen /hpf Final    Urine Culture 10/18/2023 80,000-89,000 cfu/ml   Final Mixed Contaminants X3   Orders Only on 10/04/2023   Component Date Value Ref Range Status    White Blood Cell Count 10/04/2023 7.9  3.4 - 10.8 x10E3/uL Final    Red Blood Cell Count 10/04/2023 3.90  3.77 - 5.28 x10E6/uL Final    Hemoglobin 10/04/2023 11.8  11.1 - 15.9 g/dL Final    HCT 10/04/2023 36.1  34.0 - 46.6 % Final    MCV 10/04/2023 93  79 - 97 fL Final    MCH 10/04/2023 30.3  26.6 - 33.0 pg Final    MCHC 10/04/2023 32.7  31.5 - 35.7 g/dL Final    RDW 10/04/2023 13.1  11.7 - 15.4 % Final    Platelet Count 68/86/6248 311  150 - 450 x10E3/uL Final    Neutrophils 10/04/2023 61  Not Estab. % Final    Lymphocytes 10/04/2023 24  Not Estab. % Final    Monocytes 10/04/2023 9  Not Estab. % Final    Eosinophils 10/04/2023 5  Not Estab. % Final    Basophils PCT 10/04/2023 1  Not Estab. % Final    Neutrophils (Absolute) 10/04/2023 4.8  1.4 - 7.0 x10E3/uL Final    Lymphocytes (Absolute) 10/04/2023 1.9  0.7 - 3.1 x10E3/uL Final    Monocytes (Absolute) 10/04/2023 0.7  0.1 - 0.9 x10E3/uL Final    Eosinophils (Absolute) 10/04/2023 0.4  0.0 - 0.4 x10E3/uL Final    Basophils ABS 10/04/2023 0.1  0.0 - 0.2 x10E3/uL Final    Immature Granulocytes 10/04/2023 0  Not Estab. % Final    Immature Granulocytes (Absolute) 10/04/2023 0.0  0.0 - 0.1 x10E3/uL Final    Comment: A hand-written panel/profile was received from your office. In  accordance with the [facility] Ambiguous Test Code Policy dated July 1199, we have assigned CBC with Differential/Platelet, Test Code  #[id number] to this request. If this is not the testing you wished to  receive on this specimen, please contact the [facility] to clarify the test order. We  appreciate your business.       Glucose, Random 10/04/2023 95  70 - 99 mg/dL Final    BUN 10/04/2023 11  8 - 27 mg/dL Final    Creatinine 10/04/2023 0.83  0.57 - 1.00 mg/dL Final    eGFR 10/04/2023 73  >59 mL/min/1.73 Final    SL AMB BUN/CREATININE RATIO 10/04/2023 13  12 - 28 Final Sodium 10/04/2023 140  134 - 144 mmol/L Final    Potassium 10/04/2023 4.2  3.5 - 5.2 mmol/L Final    Chloride 10/04/2023 104  96 - 106 mmol/L Final    CO2 10/04/2023 24  20 - 29 mmol/L Final    CALCIUM 10/04/2023 8.9  8.7 - 10.3 mg/dL Final    Protein, Total 10/04/2023 6.0  6.0 - 8.5 g/dL Final    Albumin 10/04/2023 3.7 (L)  3.8 - 4.8 g/dL Final    Globulin, Total 10/04/2023 2.3  1.5 - 4.5 g/dL Final    Albumin/Globulin Ratio 10/04/2023 1.6  1.2 - 2.2 Final    TOTAL BILIRUBIN 10/04/2023 0.3  0.0 - 1.2 mg/dL Final    Alk Phos Isoenzymes 10/04/2023 86  44 - 121 IU/L Final    AST 10/04/2023 11  0 - 40 IU/L Final    ALT 10/04/2023 11  0 - 32 IU/L Final    Specific Gravity 10/04/2023 1.017  1.005 - 1.030 Final    Ph 10/04/2023 7.5  5.0 - 7.5 Final    Color UA 10/04/2023 Yellow  Yellow Final    Urine Appearance 10/04/2023 Cloudy (A)  Clear Final    Leukocyte Esterase 10/04/2023 1+ (A)  Negative Final    Protein 10/04/2023 Negative  Negative/Trace Final    Glucose, 24 HR Urine 10/04/2023 Negative  Negative Final    Ketone, Urine 10/04/2023 Negative  Negative Final    Blood, Urine 10/04/2023 Negative  Negative Final    Bilirubin, Urine 10/04/2023 Negative  Negative Final    Urobilinogen Urine 10/04/2023 0.2  0.2 - 1.0 mg/dL Final    Nitrites Urine 10/04/2023 Negative  Negative Final    Microscopic Examination 10/04/2023 See below:   Final    Microscopic was indicated and was performed. Urinalysis Reflex 10/04/2023 Comment   Final    This specimen has reflexed to a Urine Culture. SL AMB WBC, URINE 10/04/2023 11-30 (A)  0 - 5 /hpf Final    RBC, Urine 10/04/2023 0-2  0 - 2 /hpf Final    Epithelial Cells (non renal) 10/04/2023 0-10  0 - 10 /hpf Final    Casts 10/04/2023 None seen  None seen /lpf Final    Bacteria, Urine 10/04/2023 Few  None seen/Few Final    Urine Culture, Comprehensive 10/04/2023 Final report   Final    Result 1 10/04/2023 Comment   Final    Comment: More than 3 organisms recovered, none predominant. Please submit  another culture if clinically indicated. 25,000-50,000 colony forming units per mL      Cholesterol, Total 10/04/2023 196  100 - 199 mg/dL Final    Triglycerides 10/04/2023 91  0 - 149 mg/dL Final    HDL 10/04/2023 64  >39 mg/dL Final    VLDL Cholesterol Calculated 10/04/2023 16  5 - 40 mg/dL Final    LDL Calculated 10/04/2023 116 (H)  0 - 99 mg/dL Final    Hemoglobin A1C 10/04/2023 5.5  4.8 - 5.6 % Final    Comment:          Prediabetes: 5.7 - 6.4           Diabetes: >6.4           Glycemic control for adults with diabetes: <7.0      25-HYDROXY VIT D 10/04/2023 99.1  30.0 - 100.0 ng/mL Final    Comment: Vitamin D deficiency has been defined by the [place] of  Medicine and an Endocrine Society practice guideline as a  level of serum 25-OH vitamin D less than 20 ng/mL (1,2). The Endocrine Society went on to further define vitamin D  insufficiency as a level between 21 and 29 ng/mL (2). 1. IOM ([place] of [employer]). 2010. Dietary reference     intakes for calcium and D. [employer]: The     [employer]. 2. Anthony MF, Aydee NC, Gabriela-Irwin MARIN, et al.     Evaluation, treatment, and prevention of vitamin D     deficiency: an Endocrine Society clinical practice     guideline. JCEM. 2011 Jul; 96(7):1911-30. TSH 10/04/2023 1.470  0.450 - 4.500 uIU/mL Final         Komal Wagner MD        "This note has been constructed using a voice recognition system. Therefore there may be syntax, spelling, and/or grammatical errors.  Please call if you have any questions. "

## 2023-11-13 ENCOUNTER — TELEPHONE (OUTPATIENT)
Dept: NEUROLOGY | Facility: CLINIC | Age: 78
End: 2023-11-13

## 2023-11-27 ENCOUNTER — CONSULT (OUTPATIENT)
Dept: NEUROLOGY | Facility: CLINIC | Age: 78
End: 2023-11-27
Payer: MEDICARE

## 2023-11-27 VITALS
BODY MASS INDEX: 34.04 KG/M2 | HEIGHT: 62 IN | OXYGEN SATURATION: 98 % | HEART RATE: 74 BPM | WEIGHT: 185 LBS | DIASTOLIC BLOOD PRESSURE: 90 MMHG | SYSTOLIC BLOOD PRESSURE: 150 MMHG | TEMPERATURE: 97.6 F

## 2023-11-27 DIAGNOSIS — R26.81 UNSTEADY GAIT WHEN WALKING: Primary | ICD-10-CM

## 2023-11-27 DIAGNOSIS — M51.9 LUMBAR DISC DISEASE: ICD-10-CM

## 2023-11-27 DIAGNOSIS — R26.81 UNSTEADY GAIT: ICD-10-CM

## 2023-11-27 PROCEDURE — 99205 OFFICE O/P NEW HI 60 MIN: CPT | Performed by: PSYCHIATRY & NEUROLOGY

## 2023-11-27 NOTE — PROGRESS NOTES
Outpatient Neurology History and Physical  Jermaine Bojorquez  [id number]  38 y.o.  1945          Consult: Yes    Kush Raines MD      Chief Complaint   Patient presents with   • Gait Problem           History Obtained from: Patient     HPI:       Jermaine Bojorquez is a 68 yo F with PMH of HTN, arthritis presents to discuss her gait. Initially she thought she was referred here for abnormal mri brain in May. It only shows age associated small vessel disease and dilated perivascular space in lower BG of unclear clinical significance. Patient does report some difficulty walking. She would feel off balance. She tried PT for sometime but that hasn't helped. She denies shuffling. No associated tremor. Denies any difficulty lifting her feet. Patient states that since her shoulder surgery in 2013, she would have problem climbing up stairs. She had orthostatics done at PCP and her standing bp did go down to 96/70 and her pcp did adjust her antihypertensive. 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      Past Medical History:   Diagnosis Date   • Anxiety disorder    • Arthritis    • Bright red rectal bleeding    • Colon polyp    • DJD (degenerative joint disease)    • Hernia A year ago   • High cholesterol    • Hyperactivity of bladder    • Hypertension    • Irritable bowel syndrome Last July   • Macular degeneration    • Obesity    • Osteoporosis                Current Outpatient Medications on File Prior to Visit   Medication Sig Dispense Refill   • ALPRAZolam (XANAX) 0.25 mg tablet Take 1 tablet (0.25 mg total) by mouth 2 (two) times a day as needed for anxiety 40 tablet 0   • aspirin (ECOTRIN LOW STRENGTH) 81 mg EC tablet Take 81 mg by mouth daily     • Biotin 1000 MCG tablet Take 1,000 mcg by mouth 3 (three) times a day     • Calcium Acetate, Phos Binder, (CALCIUM ACETATE PO) Take 2 tablets by mouth daily CALCIUM CARBONATE/VITAMIN D3 (CALCIUM 600 WITH VITAMIN D3 ORAL)     • Cranberry 200 MG CAPS Take by mouth in the morning     • docusate sodium (COLACE) 50 mg capsule Take by mouth     • Flaxseed, Linseed, (FLAX SEED OIL) 1000 MG CAPS Flax Seed Oil 1000 MG Oral Capsule   Refills: 0    Active     • fluticasone (FLONASE) 50 mcg/act nasal spray 2 sprays into each nostril daily 11.1 mL 1   • MAGNESIUM CARBONATE PO Take 1 tablet by mouth every evening     • Multiple Vitamins-Minerals (PRESERVISION AREDS 2 PO) Take by mouth     • NATURAL PSYLLIUM SEED PO Take 1 tablet by mouth every evening     • Omega-3 Fatty Acids (FISH OIL) 645 MG CAPS Fish Oil CAPS   Refills: 0    Active     • Potassium 95 MG TABS Potassium TABS   Refills: 0    Active     • TURMERIC PO Take 1 tablet by mouth every evening       No current facility-administered medications on file prior to visit.        Allergies   Allergen Reactions   • Zithromax [Azithromycin] Swelling   • Atorvastatin Other (See Comments)     legs get stiff   • Erythromycin Base Palpitations         Family History   Problem Relation Age of Onset   • Hearing loss Mother    • Esophageal cancer Father    • Hypertension Sister    • No Known Problems Sister    • Esophageal cancer Maternal Grandmother    • Diabetes Paternal Grandmother    • Breast cancer Maternal Aunt    • No Known Problems Maternal Aunt    • No Known Problems Maternal Aunt    • No Known Problems Maternal Aunt    • Skin cancer Paternal Aunt    • No Known Problems Paternal Aunt    • No Known Problems Paternal Aunt    • Cancer Paternal Aunt    • Breast cancer Cousin                 Past Surgical History:   Procedure Laterality Date   • CHOLECYSTECTOMY     • COLONOSCOPY     • DXA PROCEDURE (HISTORICAL)  06/23/2021   • INTRAOCULAR LENS INSERTION     • MAMMO (HISTORICAL)  01/04/2022   • TOTAL SHOULDER REPLACEMENT Left            Social History     Socioeconomic History   • Marital status: /Civil Union     Spouse name: Not on file   • Number of children: Not on file   • Years of education: Not on file   • Highest education level: Not on file   Occupational History   • Not on file   Tobacco Use   • Smoking status: Former     Packs/day: 0.00     Years: 15.00     Total pack years: 0.00     Types: Cigarettes     Passive exposure: Past   • Smokeless tobacco: Never   • Tobacco comments:     quit cigarettes age 33   Vaping Use   • Vaping Use: Never used   Substance and Sexual Activity   • Alcohol use: No   • Drug use: Never   • Sexual activity: Not Currently     Partners: Male     Birth control/protection: Male Sterilization   Other Topics Concern   • Not on file   Social History Narrative   • Not on file     Social Determinants of Health     Financial Resource Strain: Low Risk  (10/10/2023)    Overall Financial Resource Strain (CARDIA)    • Difficulty of Paying Living Expenses: Not very hard   Food Insecurity: Not on file   Transportation Needs: No Transportation Needs (10/10/2023)    PRAPARE - Transportation    • Lack of Transportation (Medical): No    • Lack of Transportation (Non-Medical):  No   Physical Activity: Not on file   Stress: Not on file   Social Connections: Not on file   Intimate Partner Violence: Not on file   Housing Stability: Not on file       Review of Systems  Refer to positive review of systems in HPI  Constitutional- No fever  Eyes- No visual change  ENT- Hearing normal  CV- No chest pain  Resp- No Shortness of breath  GI- No diarrhea  - Bladder normal  MS- No Arthritis   Skin- No rash  Psych- No depression  Endo- No DM  Heme- No nodes    PHYSICAL EXAM:    Vitals:    11/27/23 1349   BP: 150/90   BP Location: Right arm   Patient Position: Sitting   Cuff Size: Standard   Pulse: 74   Temp: 97.6 °F (36.4 °C)   TempSrc: Tympanic   SpO2: 98%   Weight: 83.9 kg (185 lb)   Height: 5' 2" (1.575 m)         Appearance: No Acute Distress  Ophthalmoscopic: Disc Flat, Normal fundus  Carotid/Heart/Peripheral Vascular: No Bruits, RRR  Orientation: Awake, Alert, and Oriented x 3  Mental status:  Memory: Registation 3/3 Recall 3/3  Attention: Normal  Knowledge: Appropriate  Language: No aphasia  Speech: No dysarthria  Cranial Nerves:  2 No Visual Defect on Confrontation; Pupils round, equal, reactive to light  3,4,6 Extraocular Movements Intact; no nystagmus  5 Facial Sensation Intact  7 No facial asymmetry  8 Intact hearing  9,10 Palate symmetric, normal gag  11 Good shoulder shrug  12 Tongue Midline  Gait: poor balance. Coordination: No ataxia with finger to nose testing and heel to shin testing  Sensory: Intact, Symmetric to Pinprick, Light Touch, Vibration, and Joint Position  Muscle Tone: Normal  Muscle exam  Arm Right Left Leg Right Left   Deltoid 5/5 5/5 Iliopsoas 5/5 5/5   Biceps 5/5 5/5 Quads 5/5 5/5   Triceps 5/5 5/5 Hamstrings 5/5 5/5   Wrist Extension 5/5 5/5 Ankle Dorsi Flexion 5/5 5/5   Wrist Flexion 5/5 5/5 Ankle Plantar Flexion 5/5 5/5   Interossei 5/5 5/5 Ankle Eversion 5/5 5/5   APB 5/5 5/5 Ankle Inversion 5/5 5/5       Reflexes   RJ BJ TJ KJ AJ Plantars Paul's   Right 2+ 2+ 2+ 2+ 0 Downgoing Not present   Left 2+ 2+ 2+ 2+ 0 Downgoing Not present         Personal review of          Mri brain   Assessment/Plan:     1. Unsteady gait when walking  Vitamin B12    Methylmalonic acid, serum    RPR-Syphilis Screening (Total Syphilis IGG/IGM)    Sedimentation rate, automated    CK    Protein electrophoresis, serum    Protein electrophoresis, urine    MRI lumbar spine without contrast      2. Unsteady gait  Ambulatory referral to Neurology      3. Lumbar disc disease  MRI lumbar spine without contrast          We are not noticing any signs of PD or other neurodegenerative disease. Her mri brain showed no evidence of NPH. Will check for reversible causes. Will get one time MRI LS spine w/o contrast               Counseling Documentation:  The patient and/or patient's family were  counseled regarding diagnostic results.  Instructions for management,risk factor reductions,prognosis of disease were discussed. Patient and family were educated regarding impressions,risks and benefits of treatment options,importance of compliance with treatment. Total time of encounter: 60 min   More than 50% of time was spent in counseling and coordination of care of patient. Kamran Garcia M.D.   [facility]  [address]

## 2023-12-05 ENCOUNTER — OFFICE VISIT (OUTPATIENT)
Dept: FAMILY MEDICINE CLINIC | Facility: CLINIC | Age: 78
End: 2023-12-05
Payer: MEDICARE

## 2023-12-05 VITALS
WEIGHT: 184 LBS | SYSTOLIC BLOOD PRESSURE: 130 MMHG | HEART RATE: 85 BPM | HEIGHT: 62 IN | DIASTOLIC BLOOD PRESSURE: 66 MMHG | OXYGEN SATURATION: 97 % | BODY MASS INDEX: 33.86 KG/M2

## 2023-12-05 DIAGNOSIS — R55 NEAR SYNCOPE: Primary | ICD-10-CM

## 2023-12-05 DIAGNOSIS — I10 PRIMARY HYPERTENSION: ICD-10-CM

## 2023-12-05 DIAGNOSIS — E66.09 CLASS 1 OBESITY DUE TO EXCESS CALORIES WITH SERIOUS COMORBIDITY AND BODY MASS INDEX (BMI) OF 34.0 TO 34.9 IN ADULT: ICD-10-CM

## 2023-12-05 DIAGNOSIS — R25.2 MUSCLE CRAMPS: ICD-10-CM

## 2023-12-05 DIAGNOSIS — E78.5 DYSLIPIDEMIA: ICD-10-CM

## 2023-12-05 DIAGNOSIS — F41.9 ANXIETY AND DEPRESSION: ICD-10-CM

## 2023-12-05 DIAGNOSIS — R26.81 UNSTEADY GAIT: ICD-10-CM

## 2023-12-05 DIAGNOSIS — R32 URINARY INCONTINENCE, UNSPECIFIED TYPE: ICD-10-CM

## 2023-12-05 DIAGNOSIS — R35.0 URINARY FREQUENCY: ICD-10-CM

## 2023-12-05 DIAGNOSIS — F32.A ANXIETY AND DEPRESSION: ICD-10-CM

## 2023-12-05 PROCEDURE — 99214 OFFICE O/P EST MOD 30 MIN: CPT | Performed by: INTERNAL MEDICINE

## 2023-12-05 NOTE — ASSESSMENT & PLAN NOTE
Patient was seen by the neurologist who has recommended MRI of the lumbar spine also she has recommended B12 levels, serum protein electrophoresis urine electrophoresis and sedimentation rate and RPR we will follow-up with those reports.

## 2023-12-05 NOTE — ASSESSMENT & PLAN NOTE
No more episodes of syncope I reviewed the blood pressure readings from home fairly decent sitting position but there are blood pressure dropping readings when standing up but patient is not symptomatic.   Neurology note appreciated workup in progress she also recommended MRI of lumbar spine because of the unsteady gait

## 2023-12-05 NOTE — ASSESSMENT & PLAN NOTE
BMI is 33.65 recommended strongly to cut back on calorie intake lifestyle modification to lose weight.

## 2023-12-05 NOTE — ASSESSMENT & PLAN NOTE
Patient did not experience any muscle cramps in the recent past currently she is not taking the magnesium

## 2023-12-05 NOTE — ASSESSMENT & PLAN NOTE
Patient currently not taking any medication for the blood pressure he was on telmisartan we discontinued it. Patient does not have any episodes of lightheaded feeling syncope or episodes.   Will continue to hold the blood pressure medication last reading is 130/66

## 2023-12-05 NOTE — ASSESSMENT & PLAN NOTE
Patient had a procedure done for her uterovaginal prolapse which was a separate problem. Patient with urinary frequency and urgency she was recommended Myrbetriq medication but it was too expensive other options she had was Botox or a stimulator which at present time she does not want it.

## 2023-12-05 NOTE — PROGRESS NOTES
Name: Alonso Johnston      : 1945      MRN: [id number]  Encounter Provider: Leland Hager MD  Encounter Date: 2023   Encounter department: [address]     1. Anxiety and depression  Assessment & Plan:  Patient with anxiety and depression symptoms at times takes Xanax as needed      2. Class 1 obesity due to excess calories with serious comorbidity and body mass index (BMI) of 34.0 to 34.9 in adult  Assessment & Plan:  BMI is 33.65 recommended strongly to cut back on calorie intake lifestyle modification to lose weight. 3. Dyslipidemia  Assessment & Plan:  Continue low-cholesterol diet      4. Near syncope  Assessment & Plan:  No more episodes of syncope I reviewed the blood pressure readings from home fairly decent sitting position but there are blood pressure dropping readings when standing up but patient is not symptomatic. Neurology note appreciated workup in progress she also recommended MRI of lumbar spine because of the unsteady gait      5. Primary hypertension  Assessment & Plan:  Patient currently not taking any medication for the blood pressure he was on telmisartan we discontinued it. Patient does not have any episodes of lightheaded feeling syncope or episodes. Will continue to hold the blood pressure medication last reading is 130/66      6. Unsteady gait  Assessment & Plan:  Patient was seen by the neurologist who has recommended MRI of the lumbar spine also she has recommended B12 levels, serum protein electrophoresis urine electrophoresis and sedimentation rate and RPR we will follow-up with those reports. 7. Urinary frequency    8. Urinary incontinence, unspecified type  Assessment & Plan:  Patient had a procedure done for her uterovaginal prolapse which was a separate problem.   Patient with urinary frequency and urgency she was recommended Myrbetriq medication but it was too expensive other options she had was Botox or a stimulator which at present time she does not want it. 9. Muscle cramps  Assessment & Plan:  Patient did not experience any muscle cramps in the recent past currently she is not taking the magnesium             Subjective      Patient is coming for a follow-up evaluation with regards to the symptoms of her hypertension, dyslipidemia, unsteady gait and urinary frequency. Reviewed the reports from the neurologist as well as from the urogynecologist.  Patient was recommended to start on Myrbetriq but it has been very expensive medications she did not start it yet. Neurologist has recommended several other tests for unsteady gait. Medications reviewed labs reviewed. Review of Systems   Constitutional:  Negative for chills and fever. HENT:  Negative for ear pain and sore throat. Eyes:  Negative for pain and visual disturbance. Respiratory:  Negative for cough and shortness of breath. Cardiovascular:  Negative for chest pain and palpitations. Gastrointestinal:  Negative for abdominal pain and vomiting. Genitourinary:  Negative for dysuria and hematuria. Musculoskeletal:  Negative for arthralgias and back pain. Skin:  Negative for color change and rash. Neurological:  Negative for seizures and syncope. All other systems reviewed and are negative.       Current Outpatient Medications on File Prior to Visit   Medication Sig    ALPRAZolam (XANAX) 0.25 mg tablet Take 1 tablet (0.25 mg total) by mouth 2 (two) times a day as needed for anxiety    aspirin (ECOTRIN LOW STRENGTH) 81 mg EC tablet Take 81 mg by mouth daily    Biotin 1000 MCG tablet Take 1,000 mcg by mouth 3 (three) times a day    Calcium Acetate, Phos Binder, (CALCIUM ACETATE PO) Take 2 tablets by mouth daily CALCIUM CARBONATE/VITAMIN D3 (CALCIUM 600 WITH VITAMIN D3 ORAL)    Cranberry 200 MG CAPS Take by mouth in the morning    docusate sodium (COLACE) 50 mg capsule Take by mouth    Flaxseed, Linseed, (FLAX SEED OIL) 1000 MG CAPS Flax Seed Oil 1000 MG Oral Capsule   Refills: 0    Active    fluticasone (FLONASE) 50 mcg/act nasal spray 2 sprays into each nostril daily    Multiple Vitamins-Minerals (PRESERVISION AREDS 2 PO) Take by mouth    NATURAL PSYLLIUM SEED PO Take 1 tablet by mouth every evening    Omega-3 Fatty Acids (FISH OIL) 645 MG CAPS Fish Oil CAPS   Refills: 0    Active    Potassium 95 MG TABS Potassium TABS   Refills: 0    Active    TURMERIC PO Take 1 tablet by mouth every evening    [DISCONTINUED] MAGNESIUM CARBONATE PO Take 1 tablet by mouth every evening       Objective     /66 (BP Location: Left arm, Patient Position: Sitting, Cuff Size: Standard) Comment: Patient noted to have some orthostatic changes with standing up the blood pressure is 106/66 but patient was not symptomatic  Pulse 85   Ht 5' 2" (1.575 m)   Wt 83.5 kg (184 lb)   SpO2 97%   BMI 33.65 kg/m²     Physical Exam  Vitals and nursing note reviewed. Constitutional:       Appearance: Normal appearance. Cardiovascular:      Rate and Rhythm: Normal rate and regular rhythm. Heart sounds: Normal heart sounds. Pulmonary:      Effort: Pulmonary effort is normal.      Breath sounds: Normal breath sounds. Abdominal:      General: Abdomen is flat. Palpations: Abdomen is soft. Musculoskeletal:      Cervical back: Normal range of motion and neck supple. Right lower leg: No edema. Left lower leg: No edema. Skin:     General: Skin is warm and dry. Neurological:      General: No focal deficit present. Mental Status: She is alert and oriented to person, place, and time.    Psychiatric:         Mood and Affect: Mood normal.       Recent Results (from the past 1344 hour(s))   UA w Reflex to Microscopic w Reflex to Culture    Collection Time: 10/18/23  1:42 PM    Specimen: Urine, Clean Catch   Result Value Ref Range    Color, UA Yellow     Clarity, UA Extra Turbid     Specific Gravity, UA 1.023 1.003 - 1.030    pH, UA 8.0 4.5, 5.0, 5. 5, 6.0, 6.5, 7.0, 7.5, 8.0    Leukocytes, UA Small (A) Negative    Nitrite, UA Negative Negative    Protein, UA 50 (1+) (A) Negative mg/dl    Glucose, UA Negative Negative mg/dl    Ketones, UA Negative Negative mg/dl    Urobilinogen, UA 2.0 (A) <2.0 mg/dl mg/dl    Bilirubin, UA Negative Negative    Occult Blood, UA Negative Negative   Urine Microscopic    Collection Time: 10/18/23  1:42 PM   Result Value Ref Range    RBC, UA 2-4 (A) None Seen, 1-2 /hpf    WBC, UA Innumerable (A) None Seen, 1-2 /hpf    Epithelial Cells Moderate (A) None Seen, Occasional /hpf    Bacteria, UA Moderate (A) None Seen, Occasional /hpf    MUCUS THREADS Innumerable (A) None Seen    Hyaline Casts, UA 5-10 (A) None Seen /lpf    Amorphous Crystals, UA Occasional     Ca Oxalate Ximena, UA Innumerable (A) None Seen /hpf   Urine culture    Collection Time: 10/18/23  1:42 PM    Specimen: Urine, Clean Catch   Result Value Ref Range    Urine Culture 80,000-89,000 cfu/ml    UA w Reflex to Microscopic w Reflex to Culture    Collection Time: 10/27/23  1:58 PM    Specimen: Urine, Clean Catch   Result Value Ref Range    Color, UA Light Yellow     Clarity, UA Clear     Specific Gravity, UA 1.018 1.003 - 1.030    pH, UA 7.5 4.5, 5.0, 5.5, 6.0, 6.5, 7.0, 7.5, 8.0    Leukocytes, UA Negative Negative    Nitrite, UA Negative Negative    Protein, UA Trace (A) Negative mg/dl    Glucose, UA Negative Negative mg/dl    Ketones, UA Negative Negative mg/dl    Urobilinogen, UA <2.0 <2.0 mg/dl mg/dl    Bilirubin, UA Negative Negative    Occult Blood, UA Negative Negative   Urine Microscopic    Collection Time: 10/27/23  1:58 PM   Result Value Ref Range    RBC, UA None Seen None Seen, 1-2 /hpf    WBC, UA 2-4 (A) None Seen, 1-2 /hpf    Epithelial Cells Occasional None Seen, Occasional /hpf    Bacteria, UA None Seen None Seen, Occasional /hpf    MUCUS THREADS Occasional (A) None Seen    Hyaline Casts, UA 0-3 (A) None Seen /lpf    Ca Oxalate Ximena, UA Innumerable (A) None Seen /hpf      Ana Cedeño MD

## 2023-12-07 ENCOUNTER — OFFICE VISIT (OUTPATIENT)
Age: 78
End: 2023-12-07
Payer: MEDICARE

## 2023-12-07 VITALS
SYSTOLIC BLOOD PRESSURE: 136 MMHG | RESPIRATION RATE: 17 BRPM | HEIGHT: 62 IN | DIASTOLIC BLOOD PRESSURE: 81 MMHG | WEIGHT: 184 LBS | BODY MASS INDEX: 33.86 KG/M2 | HEART RATE: 81 BPM

## 2023-12-07 DIAGNOSIS — I70.209 PERIPHERAL ARTERIOSCLEROSIS (HCC): Primary | ICD-10-CM

## 2023-12-07 DIAGNOSIS — M79.672 PAIN IN BOTH FEET: ICD-10-CM

## 2023-12-07 DIAGNOSIS — B35.1 ONYCHOMYCOSIS: ICD-10-CM

## 2023-12-07 DIAGNOSIS — M79.671 PAIN IN BOTH FEET: ICD-10-CM

## 2023-12-07 PROCEDURE — 11721 DEBRIDE NAIL 6 OR MORE: CPT | Performed by: PODIATRIST

## 2023-12-07 NOTE — PROGRESS NOTES
Assessment/Plan:  Pain. Mycosis of nail. Paronychia. Peripheral artery disease. Plan. Chart reviewed. Foot exam performed. Patient educated on condition. All mycotic nails debrided without pain or complication. Nails debrided mechanically with nail nipper. Aftercare instruction given. Return for follow-up     Subjective:   Patient complains of pain in her feet with ambulation. No history of trauma. Past Medical History:   Diagnosis Date    Hypertension      Macular degeneration                     Past Surgical History:   Procedure Laterality Date    INTRAOCULAR LENS INSERTION                       Allergies   Allergen Reactions    Atorvastatin Other (See Comments)       legs get stiff    Zithromax [Azithromycin]      Erythromycin Base Palpitations            Current Outpatient Prescriptions:     Calcium Carb-Cholecalciferol (CALCIUM 1000 + D) 1000-800 MG-UNIT TABS, Calcium TABS  Refills: 0  Active, Disp: , Rfl:     cholecalciferol (VITAMIN D3) 1,000 units tablet, Vitamin D TABS  Refills: 0  Active, Disp: , Rfl:     Cinnamon 500 MG capsule, Cinnamon CAPS  Refills: 0  Active, Disp: , Rfl:     cycloSPORINE (RESTASIS) 0.05 % ophthalmic emulsion, Restasis 0.05 % Ophthalmic Emulsion  Refills: 0  Active, Disp: , Rfl:     Flaxseed, Linseed, (FLAX SEED OIL) 1000 MG CAPS, Flax Seed Oil 1000 MG Oral Capsule  Refills: 0  Active, Disp: , Rfl:     fluticasone (FLONASE) 50 mcg/act nasal spray, Fluticasone Propionate 50 MCG/ACT Nasal Suspension  Quantity: 16;   Refills: 0   Started 29-Nov-2014 Active, Disp: , Rfl:     Magnesium 100 MG CAPS, Magnesium TABS  Refills: 0  Active, Disp: , Rfl:     Omega-3 Fatty Acids (FISH OIL) 645 MG CAPS, Fish Oil CAPS  Refills: 0  Active, Disp: , Rfl:     Potassium 95 MG TABS, Potassium TABS  Refills: 0  Active, Disp: , Rfl:     telmisartan (MICARDIS) 80 MG tablet, Micardis 80 MG Oral Tablet  Refills: 0  Active, Disp: , Rfl:      There is no problem list on file for this patient. Patient ID: Madison Luke is a 69 y.o. female. HPI     The following portions of the patient's history were reviewed and updated as appropriate: allergies, current medications, past family history, past medical history, past social history, past surgical history and problem list.     Review of Systems       Objective:  Patient's shoes and socks removed. Foot ExamPhysical Exam             Physical Exam  Left Foot: Appearance: Normal except as noted: excessive supination\R\R\b0pes cavus. Tenderness: None except the great toe,\R\w4ravxoy longitudinal arch\R\R\c8wewydscdf of the plantar fascia. Right Foot: Appearance: Normal except as noted: excessive supination\R\R\b0pes cavus. Tenderness: None except the medial longitudinal arch\R\R\w2yraelkffe of the plantar fascia. Left Ankle: ROM: limited ROM in all planes   Right Ankle: ROM: limited ROM in all planes   Neurological Exam: performed. Light touch was intact bilaterally. Vibratory sensation was intact bilaterally. Response to monofilament test was intact bilaterally. Deep tendon reflexes: patellar reflex present bilaterally\R\R\r6dcewwhng reflex present bilaterally. Vascular Exam: performed Dorsalis pedis pulses were diminished bilaterally. Posterior tibial pulses were diminished bilaterally. Elevation Pallor: present bilaterally. Capillary refill time was greater than 3 seconds bilaterally\R\R\t2Gsfnf 8 findings bilateral negative digital hair noted all mycotic nails debrided today. Edema: mild bilaterally. These are Q, 9 findings bilateral  Toenails: All of the toenails were elongated,\R\c4cqgqqadwhoukl,\R\r9bjwqpqcnck,\R\o2thpaluw,\R\e8otcgsa\R\R\q4Njbmyrv. Hyperkeratosis: present on both first sub metatarsals. Shoe Gear Evaluation: Recommendation(s): custom inlays\R\R\b0SAS style.

## 2023-12-11 ENCOUNTER — TELEPHONE (OUTPATIENT)
Dept: NEUROLOGY | Facility: CLINIC | Age: 78
End: 2023-12-11

## 2023-12-11 NOTE — TELEPHONE ENCOUNTER
LMOM informing the patient that the appointment on 04/01/24 has been changed to 04/09/24 @ 03:30am due to a change in the provider schedule. Mailed the patient a appointment reminder card.

## 2023-12-12 ENCOUNTER — APPOINTMENT (OUTPATIENT)
Dept: LAB | Facility: CLINIC | Age: 78
End: 2023-12-12
Payer: MEDICARE

## 2023-12-12 DIAGNOSIS — R26.81 UNSTEADY GAIT WHEN WALKING: ICD-10-CM

## 2023-12-12 LAB
CK SERPL-CCNC: 30 U/L (ref 26–192)
ERYTHROCYTE [SEDIMENTATION RATE] IN BLOOD: 14 MM/HOUR (ref 0–29)
TREPONEMA PALLIDUM IGG+IGM AB [PRESENCE] IN SERUM OR PLASMA BY IMMUNOASSAY: NORMAL
VIT B12 SERPL-MCNC: 105 PG/ML (ref 180–914)

## 2023-12-12 PROCEDURE — 85652 RBC SED RATE AUTOMATED: CPT

## 2023-12-12 PROCEDURE — 82607 VITAMIN B-12: CPT

## 2023-12-12 PROCEDURE — 36415 COLL VENOUS BLD VENIPUNCTURE: CPT

## 2023-12-12 PROCEDURE — 86780 TREPONEMA PALLIDUM: CPT

## 2023-12-12 PROCEDURE — 83918 ORGANIC ACIDS TOTAL QUANT: CPT

## 2023-12-12 PROCEDURE — 82550 ASSAY OF CK (CPK): CPT

## 2023-12-12 PROCEDURE — 84165 PROTEIN E-PHORESIS SERUM: CPT

## 2023-12-13 ENCOUNTER — TELEPHONE (OUTPATIENT)
Dept: NEUROLOGY | Facility: CLINIC | Age: 78
End: 2023-12-13

## 2023-12-13 DIAGNOSIS — E53.8 B12 DEFICIENCY: Primary | ICD-10-CM

## 2023-12-13 RX ORDER — LANOLIN ALCOHOL/MO/W.PET/CERES
1000 CREAM (GRAM) TOPICAL DAILY
Qty: 30 TABLET | Refills: 2 | Status: SHIPPED | OUTPATIENT
Start: 2023-12-13

## 2023-12-13 NOTE — TELEPHONE ENCOUNTER
Spoke to the patient and informed her of the BW results. The patient is requesting to have the vitamin B tablets sent to the pharmacy. The patient is also questioning can this be the cause of her gait problems. Please advise    ----- Message from Emily Rose MD sent at 12/13/2023  2:49 PM EST -----  Please call the patient regarding her abnormal result. Her Vit B12 level is very low. Will send script for 1000mcg/day. If she would rather do shots, we can send those as well.

## 2023-12-14 LAB
ALBUMIN SERPL ELPH-MCNC: 3.44 G/DL (ref 3.2–5.1)
ALBUMIN SERPL ELPH-MCNC: 57.4 % (ref 48–70)
ALBUMIN UR ELPH-MCNC: 100 %
ALPHA1 GLOB MFR UR ELPH: 0 %
ALPHA1 GLOB SERPL ELPH-MCNC: 0.25 G/DL (ref 0.15–0.47)
ALPHA1 GLOB SERPL ELPH-MCNC: 4.2 % (ref 1.8–7)
ALPHA2 GLOB MFR UR ELPH: 0 %
ALPHA2 GLOB SERPL ELPH-MCNC: 0.61 G/DL (ref 0.42–1.04)
ALPHA2 GLOB SERPL ELPH-MCNC: 10.1 % (ref 5.9–14.9)
B-GLOBULIN MFR UR ELPH: 0 %
BETA GLOB ABNORMAL SERPL ELPH-MCNC: 0.43 G/DL (ref 0.31–0.57)
BETA1 GLOB SERPL ELPH-MCNC: 7.1 % (ref 4.7–7.7)
BETA2 GLOB SERPL ELPH-MCNC: 7.7 % (ref 3.1–7.9)
BETA2+GAMMA GLOB SERPL ELPH-MCNC: 0.46 G/DL (ref 0.2–0.58)
GAMMA GLOB ABNORMAL SERPL ELPH-MCNC: 0.81 G/DL (ref 0.4–1.66)
GAMMA GLOB MFR UR ELPH: 0 %
GAMMA GLOB SERPL ELPH-MCNC: 13.5 % (ref 6.9–22.3)
IGG/ALB SER: 1.35 {RATIO} (ref 1.1–1.8)
PROT PATTERN SERPL ELPH-IMP: ABNORMAL
PROT PATTERN UR ELPH-IMP: NORMAL
PROT SERPL-MCNC: 6 G/DL (ref 6.4–8.2)
PROT UR-MCNC: 15.7 MG/DL

## 2023-12-14 PROCEDURE — 84165 PROTEIN E-PHORESIS SERUM: CPT | Performed by: STUDENT IN AN ORGANIZED HEALTH CARE EDUCATION/TRAINING PROGRAM

## 2023-12-14 PROCEDURE — 84166 PROTEIN E-PHORESIS/URINE/CSF: CPT | Performed by: STUDENT IN AN ORGANIZED HEALTH CARE EDUCATION/TRAINING PROGRAM

## 2023-12-14 NOTE — TELEPHONE ENCOUNTER
Spoke to the patient and informed her that,      Yes could be along with fatigue. I did send the oral script to pharmacy.

## 2023-12-15 ENCOUNTER — HOSPITAL ENCOUNTER (OUTPATIENT)
Dept: RADIOLOGY | Facility: HOSPITAL | Age: 78
Discharge: HOME | End: 2023-12-15
Attending: INTERNAL MEDICINE
Payer: MEDICARE

## 2023-12-15 VITALS — WEIGHT: 184 LBS | BODY MASS INDEX: 33.86 KG/M2 | HEIGHT: 62 IN

## 2023-12-15 DIAGNOSIS — M81.0 AGE-RELATED OSTEOPOROSIS WITHOUT CURRENT PATHOLOGICAL FRACTURE: ICD-10-CM

## 2023-12-15 PROCEDURE — 77080 DXA BONE DENSITY AXIAL: CPT

## 2023-12-18 ENCOUNTER — TELEPHONE (OUTPATIENT)
Dept: NEUROLOGY | Facility: CLINIC | Age: 78
End: 2023-12-18

## 2023-12-18 LAB — METHYLMALONATE SERPL-SCNC: 628 NMOL/L (ref 0–378)

## 2023-12-18 NOTE — TELEPHONE ENCOUNTER
Spoke to the patient and informed her of her BW results. The patient states that she has not started taking the medication due to the injections that she receives from her eye doctor and the fine print that she read on the container. The patient states that she will like to discuss the supplement with the eye doctor to make sure that it does not interfere with her injections before starting the supplement.    ----- Message from Olga Harmon MD sent at 12/18/2023 12:39 PM EST -----  Please call the patient regarding her abnormal result.  Her lab is consistent with B12 deficiency. We had asked her to start taking B12 supplements. Just ask her to make sure she's taking it.

## 2023-12-19 ENCOUNTER — APPOINTMENT (OUTPATIENT)
Dept: LAB | Facility: CLINIC | Age: 78
End: 2023-12-19
Payer: MEDICARE

## 2023-12-19 DIAGNOSIS — E55.9 VITAMIN D DEFICIENCY: ICD-10-CM

## 2023-12-19 DIAGNOSIS — Z13.0 SCREENING FOR DEFICIENCY ANEMIA: ICD-10-CM

## 2023-12-19 DIAGNOSIS — E78.5 DYSLIPIDEMIA: ICD-10-CM

## 2023-12-19 DIAGNOSIS — R73.03 PRE-DIABETES: ICD-10-CM

## 2023-12-19 DIAGNOSIS — Z13.29 SCREENING FOR THYROID DISORDER: ICD-10-CM

## 2023-12-19 LAB
25(OH)D3 SERPL-MCNC: 85.1 NG/ML (ref 30–100)
ALBUMIN SERPL BCP-MCNC: 3.6 G/DL (ref 3.5–5)
ALP SERPL-CCNC: 68 U/L (ref 34–104)
ALT SERPL W P-5'-P-CCNC: 10 U/L (ref 7–52)
ANION GAP SERPL CALCULATED.3IONS-SCNC: 8 MMOL/L
AST SERPL W P-5'-P-CCNC: 12 U/L (ref 13–39)
BASOPHILS # BLD AUTO: 0.05 THOUSANDS/ÂΜL (ref 0–0.1)
BASOPHILS NFR BLD AUTO: 1 % (ref 0–1)
BILIRUB SERPL-MCNC: 0.4 MG/DL (ref 0.2–1)
BUN SERPL-MCNC: 16 MG/DL (ref 5–25)
CALCIUM SERPL-MCNC: 9.2 MG/DL (ref 8.4–10.2)
CHLORIDE SERPL-SCNC: 104 MMOL/L (ref 96–108)
CHOLEST SERPL-MCNC: 203 MG/DL
CO2 SERPL-SCNC: 28 MMOL/L (ref 21–32)
CREAT SERPL-MCNC: 0.89 MG/DL (ref 0.6–1.3)
EOSINOPHIL # BLD AUTO: 0.17 THOUSAND/ÂΜL (ref 0–0.61)
EOSINOPHIL NFR BLD AUTO: 3 % (ref 0–6)
ERYTHROCYTE [DISTWIDTH] IN BLOOD BY AUTOMATED COUNT: 14.2 % (ref 11.6–15.1)
EST. AVERAGE GLUCOSE BLD GHB EST-MCNC: 120 MG/DL
GFR SERPL CREATININE-BSD FRML MDRD: 62 ML/MIN/1.73SQ M
GLUCOSE P FAST SERPL-MCNC: 100 MG/DL (ref 65–99)
HBA1C MFR BLD: 5.8 %
HCT VFR BLD AUTO: 40.2 % (ref 34.8–46.1)
HDLC SERPL-MCNC: 62 MG/DL
HGB BLD-MCNC: 12.6 G/DL (ref 11.5–15.4)
IMM GRANULOCYTES # BLD AUTO: 0.02 THOUSAND/UL (ref 0–0.2)
IMM GRANULOCYTES NFR BLD AUTO: 0 % (ref 0–2)
LDLC SERPL CALC-MCNC: 125 MG/DL (ref 0–100)
LYMPHOCYTES # BLD AUTO: 1.64 THOUSANDS/ÂΜL (ref 0.6–4.47)
LYMPHOCYTES NFR BLD AUTO: 30 % (ref 14–44)
MCH RBC QN AUTO: 30.3 PG (ref 26.8–34.3)
MCHC RBC AUTO-ENTMCNC: 31.3 G/DL (ref 31.4–37.4)
MCV RBC AUTO: 97 FL (ref 82–98)
MONOCYTES # BLD AUTO: 0.47 THOUSAND/ÂΜL (ref 0.17–1.22)
MONOCYTES NFR BLD AUTO: 9 % (ref 4–12)
NEUTROPHILS # BLD AUTO: 3.14 THOUSANDS/ÂΜL (ref 1.85–7.62)
NEUTS SEG NFR BLD AUTO: 57 % (ref 43–75)
NONHDLC SERPL-MCNC: 141 MG/DL
NRBC BLD AUTO-RTO: 0 /100 WBCS
PLATELET # BLD AUTO: 297 THOUSANDS/UL (ref 149–390)
PMV BLD AUTO: 11 FL (ref 8.9–12.7)
POTASSIUM SERPL-SCNC: 4.5 MMOL/L (ref 3.5–5.3)
PROT SERPL-MCNC: 6.4 G/DL (ref 6.4–8.4)
RBC # BLD AUTO: 4.16 MILLION/UL (ref 3.81–5.12)
SODIUM SERPL-SCNC: 140 MMOL/L (ref 135–147)
TRIGL SERPL-MCNC: 79 MG/DL
TSH SERPL DL<=0.05 MIU/L-ACNC: 1.8 UIU/ML (ref 0.45–4.5)
WBC # BLD AUTO: 5.49 THOUSAND/UL (ref 4.31–10.16)

## 2023-12-19 PROCEDURE — 85025 COMPLETE CBC W/AUTO DIFF WBC: CPT

## 2023-12-19 PROCEDURE — 36415 COLL VENOUS BLD VENIPUNCTURE: CPT

## 2023-12-19 PROCEDURE — 84443 ASSAY THYROID STIM HORMONE: CPT

## 2023-12-19 PROCEDURE — 80053 COMPREHEN METABOLIC PANEL: CPT

## 2023-12-19 PROCEDURE — 83036 HEMOGLOBIN GLYCOSYLATED A1C: CPT

## 2023-12-19 PROCEDURE — 80061 LIPID PANEL: CPT

## 2023-12-19 PROCEDURE — 82306 VITAMIN D 25 HYDROXY: CPT

## 2023-12-20 ENCOUNTER — HOSPITAL ENCOUNTER (OUTPATIENT)
Dept: RADIOLOGY | Facility: HOSPITAL | Age: 78
Discharge: HOME | End: 2023-12-20
Attending: PSYCHIATRY & NEUROLOGY
Payer: MEDICARE

## 2023-12-20 DIAGNOSIS — R26.81 UNSTEADY GAIT WHEN WALKING: ICD-10-CM

## 2023-12-20 DIAGNOSIS — M51.9 LUMBAR DISC DISEASE: ICD-10-CM

## 2023-12-20 PROCEDURE — G1004 CDSM NDSC: HCPCS

## 2023-12-20 PROCEDURE — 72148 MRI LUMBAR SPINE W/O DYE: CPT

## 2023-12-27 ENCOUNTER — TELEPHONE (OUTPATIENT)
Dept: NEUROLOGY | Facility: CLINIC | Age: 78
End: 2023-12-27

## 2023-12-27 NOTE — TELEPHONE ENCOUNTER
Spoke to the patient and informed her of the MRI results. The patient is questioning the cyst that shows on the MRI results.    ----- Message from Olga Harmon MD sent at 12/27/2023 12:36 PM EST -----  Please call the patient regarding her abnormal result. 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 If pain, we can refer her to pain management.

## 2023-12-27 NOTE — TELEPHONE ENCOUNTER
Spoke to the patient and informed her that    Typically, surgeon do not do anything unless it pushes on structures. If she wants to meet with one, we can refer her.     The patient states that she has to think about seeing the surgeon and will call back once she makes her decision.

## 2024-01-22 ENCOUNTER — RA CDI HCC (OUTPATIENT)
Dept: OTHER | Facility: HOSPITAL | Age: 79
End: 2024-01-22

## 2024-01-29 ENCOUNTER — OFFICE VISIT (OUTPATIENT)
Dept: FAMILY MEDICINE CLINIC | Facility: CLINIC | Age: 79
End: 2024-01-29
Payer: MEDICARE

## 2024-01-29 VITALS
SYSTOLIC BLOOD PRESSURE: 110 MMHG | DIASTOLIC BLOOD PRESSURE: 70 MMHG | WEIGHT: 184 LBS | HEIGHT: 62 IN | BODY MASS INDEX: 33.86 KG/M2

## 2024-01-29 DIAGNOSIS — N81.4 UTEROVAGINAL PROLAPSE: ICD-10-CM

## 2024-01-29 DIAGNOSIS — E66.09 CLASS 1 OBESITY DUE TO EXCESS CALORIES WITH SERIOUS COMORBIDITY AND BODY MASS INDEX (BMI) OF 33.0 TO 33.9 IN ADULT: ICD-10-CM

## 2024-01-29 DIAGNOSIS — F32.A ANXIETY AND DEPRESSION: ICD-10-CM

## 2024-01-29 DIAGNOSIS — R35.0 URINARY FREQUENCY: ICD-10-CM

## 2024-01-29 DIAGNOSIS — M15.9 PRIMARY OSTEOARTHRITIS INVOLVING MULTIPLE JOINTS: ICD-10-CM

## 2024-01-29 DIAGNOSIS — R55 NEAR SYNCOPE: Primary | ICD-10-CM

## 2024-01-29 DIAGNOSIS — R73.03 PREDIABETES: ICD-10-CM

## 2024-01-29 DIAGNOSIS — F41.9 ANXIETY AND DEPRESSION: ICD-10-CM

## 2024-01-29 DIAGNOSIS — R26.81 UNSTEADY GAIT: ICD-10-CM

## 2024-01-29 PROBLEM — F33.42 RECURRENT MAJOR DEPRESSIVE DISORDER, IN FULL REMISSION (HCC): Status: RESOLVED | Noted: 2023-08-01 | Resolved: 2024-01-29

## 2024-01-29 PROCEDURE — 99214 OFFICE O/P EST MOD 30 MIN: CPT | Performed by: INTERNAL MEDICINE

## 2024-01-29 NOTE — ASSESSMENT & PLAN NOTE
Continue with current management recommend patient take B12 also.  The lumbar spine is unremarkable

## 2024-01-29 NOTE — ASSESSMENT & PLAN NOTE
No more episodes of syncope patient is currently taking B12 also get his still having some unsteadiness.

## 2024-01-29 NOTE — ASSESSMENT & PLAN NOTE
BMI is about the same as before at 33.65 discussed with patient regarding cutting back calorie intake lifestyle modification to lose weight

## 2024-01-29 NOTE — ASSESSMENT & PLAN NOTE
Hemoglobin A1c is 5.8 emphasized regarding diet exercise losing weight cutting back on carbohydrate intake

## 2024-01-29 NOTE — ASSESSMENT & PLAN NOTE
Patient occasionally takes Xanax otherwise she is doing fine denies any symptoms of depression at this time.

## 2024-01-29 NOTE — PROGRESS NOTES
Office Visit Note  24     Neisha Salazar 78 y.o. female MRN: [id number]  : 1945    Assessment:     1. Near syncope  Assessment & Plan:  No more episodes of syncope patient is currently taking B12 also get his still having some unsteadiness.      2. Anxiety and depression  Assessment & Plan:  Patient occasionally takes Xanax otherwise she is doing fine denies any symptoms of depression at this time.      3. Class 1 obesity due to excess calories with serious comorbidity and body mass index (BMI) of 33.0 to 33.9 in adult  Assessment & Plan:  BMI is about the same as before at 33.65 discussed with patient regarding cutting back calorie intake lifestyle modification to lose weight      4. Primary osteoarthritis involving multiple joints  Assessment & Plan:  Will do symptomatic treatment for now Tylenol as needed      5. Unsteady gait  Assessment & Plan:  Continue with current management recommend patient take B12 also.  The lumbar spine is unremarkable      6. Urinary frequency  Assessment & Plan:  Follow-up with your gynecologist      7. Uterovaginal prolapse  Assessment & Plan:  Follow-up with urogynecology status post surgery      8. Prediabetes  Assessment & Plan:  Hemoglobin A1c is 5.8 emphasized regarding diet exercise losing weight cutting back on carbohydrate intake                 Discussion Summary and Plan:  Today's care plan and medications were reviewed with patient in detail and all their questions answered to their satisfaction.    Chief Complaint   Patient presents with    Follow-up      Subjective:  Patient is coming here for a follow-up evaluation with regards to her symptoms of hypertension/hypotension history of having syncope type episodes in the past. after stopping the blood pressure medication she has been stable I reviewed all the blood pressure readings from home that she has highest was 125/75 and the lowest was 86/65 standing.  Patient denies any symptoms of chest pain  palpitation shortness of breath medication reviewed labs reviewed consultants reports reviewed.  MRI report reviewed        The following portions of the patient's history were reviewed and updated as appropriate: allergies, current medications, past family history, past medical history, past social history, past surgical history and problem list.    Review of Systems   Constitutional:  Negative for chills and fever.   HENT:  Negative for ear pain and sore throat.    Eyes:  Negative for pain and visual disturbance.   Respiratory:  Negative for cough and shortness of breath.    Cardiovascular:  Negative for chest pain and palpitations.   Gastrointestinal:  Negative for abdominal pain and vomiting.   Genitourinary:  Negative for dysuria and hematuria.   Musculoskeletal:  Negative for arthralgias and back pain.   Skin:  Negative for color change and rash.   Neurological:  Negative for seizures and syncope.   All other systems reviewed and are negative.        Historical Information   Patient Active Problem List   Diagnosis    Primary hypertension    Class 1 obesity due to excess calories in adult    Dyslipidemia    Primary osteoarthritis involving multiple joints    Urinary frequency    Muscle cramps    Unsteady gait    Anxiety and depression    Other hemorrhoids    Uterovaginal prolapse    Urinary incontinence    Near syncope    Prediabetes     Past Medical History:   Diagnosis Date    Anxiety disorder     Arthritis     Bright red rectal bleeding     Colon polyp     DJD (degenerative joint disease)     Hernia A year ago    High cholesterol     Hyperactivity of bladder     Hypertension     Irritable bowel syndrome Last July    Macular degeneration     Obesity     Osteoporosis     Plantar fasciitis     Not sure of date     Past Surgical History:   Procedure Laterality Date    CARDIAC SURGERY      CHOLECYSTECTOMY      COLONOSCOPY      DXA PROCEDURE (HISTORICAL)  06/23/2021    INTRAOCULAR LENS INSERTION      MAMMO  (HISTORICAL)  01/04/2022    TONSILLECTOMY      TOTAL SHOULDER REPLACEMENT Left      Social History     Substance and Sexual Activity   Alcohol Use No     Social History     Substance and Sexual Activity   Drug Use Never     Social History     Tobacco Use   Smoking Status Former    Current packs/day: 0.00    Types: Cigarettes    Passive exposure: Past   Smokeless Tobacco Never   Tobacco Comments    quit cigarettes age 27     Family History   Problem Relation Age of Onset    Hearing loss Mother     Esophageal cancer Father     Hypertension Sister     No Known Problems Sister     Esophageal cancer Maternal Grandmother     Diabetes Paternal Grandmother     Breast cancer Maternal Aunt 70    No Known Problems Maternal Aunt     No Known Problems Maternal Aunt     No Known Problems Maternal Aunt     Skin cancer Paternal Aunt     No Known Problems Paternal Aunt     No Known Problems Paternal Aunt     Cancer Paternal Aunt     Breast cancer Cousin 77     Health Maintenance Due   Topic    Hepatitis C Screening     Pneumococcal Vaccine: 65+ Years (1 - PCV)    COVID-19 Vaccine (3 - 2023-24 season)    PT PLAN OF CARE       Meds/Allergies       Current Outpatient Medications:     ALPRAZolam (XANAX) 0.25 mg tablet, Take 1 tablet (0.25 mg total) by mouth 2 (two) times a day as needed for anxiety, Disp: 40 tablet, Rfl: 0    aspirin (ECOTRIN LOW STRENGTH) 81 mg EC tablet, Take 81 mg by mouth daily, Disp: , Rfl:     Biotin 1000 MCG tablet, Take 1,000 mcg by mouth 3 (three) times a day, Disp: , Rfl:     Calcium Acetate, Phos Binder, (CALCIUM ACETATE PO), Take 2 tablets by mouth daily CALCIUM CARBONATE/VITAMIN D3 (CALCIUM 600 WITH VITAMIN D3 ORAL), Disp: , Rfl:     Cranberry 200 MG CAPS, Take by mouth in the morning, Disp: , Rfl:     docusate sodium (COLACE) 50 mg capsule, Take by mouth, Disp: , Rfl:     Flaxseed, Linseed, (FLAX SEED OIL) 1000 MG CAPS, Flax Seed Oil 1000 MG Oral Capsule  Refills: 0  Active, Disp: , Rfl:     fluticasone  "(FLONASE) 50 mcg/act nasal spray, 2 sprays into each nostril daily, Disp: 11.1 mL, Rfl: 1    Multiple Vitamins-Minerals (PRESERVISION AREDS 2 PO), Take by mouth, Disp: , Rfl:     NATURAL PSYLLIUM SEED PO, Take 1 tablet by mouth every evening, Disp: , Rfl:     Omega-3 Fatty Acids (FISH OIL) 645 MG CAPS, Fish Oil CAPS  Refills: 0  Active, Disp: , Rfl:     Potassium 95 MG TABS, Potassium TABS  Refills: 0  Active, Disp: , Rfl:     TURMERIC PO, Take 1 tablet by mouth every evening, Disp: , Rfl:     vitamin B-12 (VITAMIN B-12) 1,000 mcg tablet, Take 1 tablet (1,000 mcg total) by mouth daily, Disp: 30 tablet, Rfl: 2      Objective:    Vitals:   /70 (BP Location: Right arm, Patient Position: Sitting, Cuff Size: Standard)   Ht 5' 2\" (1.575 m)   Wt 83.5 kg (184 lb)   BMI 33.65 kg/m²   Body mass index is 33.65 kg/m².  Vitals:    01/29/24 1326   Weight: 83.5 kg (184 lb)       Physical Exam  Vitals and nursing note reviewed.   Constitutional:       Appearance: Normal appearance. She is obese.   Cardiovascular:      Rate and Rhythm: Normal rate and regular rhythm.      Heart sounds: Normal heart sounds.   Pulmonary:      Effort: Pulmonary effort is normal.      Breath sounds: Normal breath sounds.   Abdominal:      General: Abdomen is flat.      Palpations: Abdomen is soft.   Musculoskeletal:      Cervical back: Normal range of motion and neck supple.      Right lower leg: No edema.      Left lower leg: No edema.   Skin:     General: Skin is warm and dry.   Neurological:      Mental Status: She is alert and oriented to person, place, and time.   Psychiatric:         Mood and Affect: Mood normal.         Behavior: Behavior normal.         Lab Review   Appointment on 12/19/2023   Component Date Value Ref Range Status    WBC 12/19/2023 5.49  4.31 - 10.16 Thousand/uL Final    RBC 12/19/2023 4.16  3.81 - 5.12 Million/uL Final    Hemoglobin 12/19/2023 12.6  11.5 - 15.4 g/dL Final    Hematocrit 12/19/2023 40.2  34.8 - 46.1 % " Final    MCV 12/19/2023 97  82 - 98 fL Final    MCH 12/19/2023 30.3  26.8 - 34.3 pg Final    MCHC 12/19/2023 31.3 (L)  31.4 - 37.4 g/dL Final    RDW 12/19/2023 14.2  11.6 - 15.1 % Final    MPV 12/19/2023 11.0  8.9 - 12.7 fL Final    Platelets 12/19/2023 297  149 - 390 Thousands/uL Final    nRBC 12/19/2023 0  /100 WBCs Final    Neutrophils Relative 12/19/2023 57  43 - 75 % Final    Immat GRANS % 12/19/2023 0  0 - 2 % Final    Lymphocytes Relative 12/19/2023 30  14 - 44 % Final    Monocytes Relative 12/19/2023 9  4 - 12 % Final    Eosinophils Relative 12/19/2023 3  0 - 6 % Final    Basophils Relative 12/19/2023 1  0 - 1 % Final    Neutrophils Absolute 12/19/2023 3.14  1.85 - 7.62 Thousands/µL Final    Immature Grans Absolute 12/19/2023 0.02  0.00 - 0.20 Thousand/uL Final    Lymphocytes Absolute 12/19/2023 1.64  0.60 - 4.47 Thousands/µL Final    Monocytes Absolute 12/19/2023 0.47  0.17 - 1.22 Thousand/µL Final    Eosinophils Absolute 12/19/2023 0.17  0.00 - 0.61 Thousand/µL Final    Basophils Absolute 12/19/2023 0.05  0.00 - 0.10 Thousands/µL Final    Hemoglobin A1C 12/19/2023 5.8 (H)  Normal 4.0-5.6%; PreDiabetic 5.7-6.4%; Diabetic >=6.5%; Glycemic control for adults with diabetes <7.0% % Final    EAG 12/19/2023 120  mg/dl Final    Sodium 12/19/2023 140  135 - 147 mmol/L Final    Potassium 12/19/2023 4.5  3.5 - 5.3 mmol/L Final    Chloride 12/19/2023 104  96 - 108 mmol/L Final    CO2 12/19/2023 28  21 - 32 mmol/L Final    ANION GAP 12/19/2023 8  mmol/L Final    BUN 12/19/2023 16  5 - 25 mg/dL Final    Creatinine 12/19/2023 0.89  0.60 - 1.30 mg/dL Final    Standardized to IDMS reference method    Glucose, Fasting 12/19/2023 100 (H)  65 - 99 mg/dL Final    Calcium 12/19/2023 9.2  8.4 - 10.2 mg/dL Final    AST 12/19/2023 12 (L)  13 - 39 U/L Final    ALT 12/19/2023 10  7 - 52 U/L Final    Specimen collection should occur prior to Sulfasalazine administration due to the potential for falsely depressed results.     Alkaline  Phosphatase 12/19/2023 68  34 - 104 U/L Final    Total Protein 12/19/2023 6.4  6.4 - 8.4 g/dL Final    Albumin 12/19/2023 3.6  3.5 - 5.0 g/dL Final    Total Bilirubin 12/19/2023 0.40  0.20 - 1.00 mg/dL Final    Use of this assay is not recommended for patients undergoing treatment with eltrombopag due to the potential for falsely elevated results.  N-acetyl-p-benzoquinone imine (metabolite of Acetaminophen) will generate erroneously low results in samples for patients that have taken an overdose of Acetaminophen.    eGFR 12/19/2023 62  ml/min/1.73sq m Final    Cholesterol 12/19/2023 203 (H)  See Comment mg/dL Final    Cholesterol:         Pediatric <18 Years        Desirable          <170 mg/dL      Borderline High    170-199 mg/dL      High               >=200 mg/dL        Adult >=18 Years            Desirable         <200 mg/dL      Borderline High   200-239 mg/dL      High              >239 mg/dL      Triglycerides 12/19/2023 79  See Comment mg/dL Final    Triglyceride:     0-9Y            <75mg/dL     10Y-17Y         <90 mg/dL       >=18Y     Normal          <150 mg/dL     Borderline High 150-199 mg/dL     High            200-499 mg/dL        Very High       >499 mg/dL    Specimen collection should occur prior to Metamizole administration due to the potential for falsely depressed results.    HDL, Direct 12/19/2023 62  >=50 mg/dL Final    LDL Calculated 12/19/2023 125 (H)  0 - 100 mg/dL Final    LDL Cholesterol:     Optimal           <100 mg/dl     Near Optimal      100-129 mg/dl     Above Optimal       Borderline High 130-159 mg/dl       High            160-189 mg/dl       Very High       >189 mg/dl         This screening LDL is a calculated result.   It does not have the accuracy of the Direct Measured LDL in the monitoring of patients with hyperlipidemia and/or statin therapy.   Direct Measure LDL (RYD732) must be ordered separately in these patients.    Non-HDL-Chol (CHOL-HDL) 12/19/2023 141  mg/dl Final     TSH 3RD GENERATON 12/19/2023 1.800  0.450 - 4.500 uIU/mL Final    The recommended reference ranges for TSH during pregnancy are as follows:   First trimester 0.100 to 2.500 uIU/mL   Second trimester  0.200 to 3.000 uIU/mL   Third trimester 0.300 to 3.000 uIU/m    Note: Normal ranges may not apply to patients who are transgender, non-binary, or whose legal sex, sex at birth, and gender identity differ.  Adult TSH (3rd generation) reference range follows the recommended guidelines of the [employer] Thy[employer], January, 2020.    Vit D, 25-Hydroxy 12/19/2023 85.1  30.0 - 100.0 ng/mL Final    Vitamin D guidelines established by Clinical Guidelines Subcommittee  of the Endocrine Society Task Force, 2011    Deficiency <20ng/ml   Insufficiency 20-30ng/ml   Sufficient  ng/ml    Appointment on 12/12/2023   Component Date Value Ref Range Status    Vitamin B-12 12/12/2023 105 (L)  180 - 914 pg/mL Final    Methylmalonic Acid, S 12/12/2023 628 (H)  0 - 378 nmol/L Final    Syphilis Total Antibody 12/12/2023 Non-reactive  Non-Reactive Final    No serological evidence of infection with T. pallidum.  Early or incubating syphilis infection cannot be excluded.  Consider repeat testing based on clinical suspicion.    Sed Rate 12/12/2023 14  0 - 29 mm/hour Final    Total CK 12/12/2023 30  26 - 192 U/L Final    A/G Ratio 12/12/2023 1.35  1.10 - 1.80 Final    Albumin Electrophoresis 12/12/2023 57.4  48.0 - 70.0 % Final    Albumin CONC 12/12/2023 3.44  3.20 - 5.10 g/dl Final    Alpha 1 12/12/2023 4.2  1.8 - 7.0 % Final    ALPHA 1 CONC 12/12/2023 0.25  0.15 - 0.47 g/dL Final    Alpha 2 12/12/2023 10.1  5.9 - 14.9 % Final    ALPHA 2 CONC 12/12/2023 0.61  0.42 - 1.04 g/dL Final    Beta-1 12/12/2023 7.1  4.7 - 7.7 % Final    BETA 1 CONC 12/12/2023 0.43  0.31 - 0.57 g/dL Final    Beta-2 12/12/2023 7.7  3.1 - 7.9 % Final    BETA 2 CONC 12/12/2023 0.46  0.20 - 0.58 g/dL Final    Gamma Globulin 12/12/2023 13.5  6.9 - 22.3 % Final     "GAMMA CONC 12/12/2023 0.81  0.40 - 1.66 g/dL Final    Total Protein 12/12/2023 6.0 (L)  6.4 - 8.2 g/dL Final    SPEP Interpretation 12/12/2023 See Comment   Final    No monoclonal bands noted. Reviewed by: Trung Burleson MD **Electronic Signature**         Tia Warren MD        \"This note has been constructed using a voice recognition system.Therefore there may be syntax, spelling, and/or grammatical errors. Please call if you have any questions. \"  "

## 2024-02-07 ENCOUNTER — TELEPHONE (OUTPATIENT)
Dept: FAMILY MEDICINE CLINIC | Facility: CLINIC | Age: 79
End: 2024-02-07

## 2024-02-07 DIAGNOSIS — Z12.31 ENCOUNTER FOR SCREENING MAMMOGRAM FOR MALIGNANT NEOPLASM OF BREAST: Primary | ICD-10-CM

## 2024-03-07 ENCOUNTER — OFFICE VISIT (OUTPATIENT)
Age: 79
End: 2024-03-07
Payer: MEDICARE

## 2024-03-07 VITALS
HEART RATE: 73 BPM | RESPIRATION RATE: 17 BRPM | SYSTOLIC BLOOD PRESSURE: 153 MMHG | HEIGHT: 62 IN | DIASTOLIC BLOOD PRESSURE: 83 MMHG | BODY MASS INDEX: 34.27 KG/M2 | WEIGHT: 186.2 LBS

## 2024-03-07 DIAGNOSIS — M79.671 PAIN IN BOTH FEET: ICD-10-CM

## 2024-03-07 DIAGNOSIS — B35.1 ONYCHOMYCOSIS: ICD-10-CM

## 2024-03-07 DIAGNOSIS — M79.672 PAIN IN BOTH FEET: ICD-10-CM

## 2024-03-07 DIAGNOSIS — I70.209 PERIPHERAL ARTERIOSCLEROSIS (HCC): Primary | ICD-10-CM

## 2024-03-07 PROCEDURE — 11721 DEBRIDE NAIL 6 OR MORE: CPT | Performed by: PODIATRIST

## 2024-03-07 NOTE — PROGRESS NOTES
Assessment/Plan:  Pain.  Mycosis of nail.  Paronychia.  Peripheral artery disease.     Plan.  Chart reviewed.  Foot exam performed.  Patient educated on condition.  All mycotic nails debrided without pain or complication.  Nails debrided mechanically with nail nipper.  Aftercare instruction given.  Return for follow-up     Subjective:   Patient complains of pain in her feet with ambulation.  No history of trauma.             Past Medical History:   Diagnosis Date    Hypertension      Macular degeneration                     Past Surgical History:   Procedure Laterality Date    INTRAOCULAR LENS INSERTION                       Allergies   Allergen Reactions    Atorvastatin Other (See Comments)       legs get stiff    Zithromax [Azithromycin]      Erythromycin Base Palpitations            Current Outpatient Prescriptions:     Calcium Carb-Cholecalciferol (CALCIUM 1000 + D) 1000-800 MG-UNIT TABS, Calcium TABS  Refills: 0  Active, Disp: , Rfl:     cholecalciferol (VITAMIN D3) 1,000 units tablet, Vitamin D TABS  Refills: 0  Active, Disp: , Rfl:     Cinnamon 500 MG capsule, Cinnamon CAPS  Refills: 0  Active, Disp: , Rfl:     cycloSPORINE (RESTASIS) 0.05 % ophthalmic emulsion, Restasis 0.05 % Ophthalmic Emulsion  Refills: 0  Active, Disp: , Rfl:     Flaxseed, Linseed, (FLAX SEED OIL) 1000 MG CAPS, Flax Seed Oil 1000 MG Oral Capsule  Refills: 0  Active, Disp: , Rfl:     fluticasone (FLONASE) 50 mcg/act nasal spray, Fluticasone Propionate 50 MCG/ACT Nasal Suspension  Quantity: 16;  Refills: 0   Started 29-Nov-2014 Active, Disp: , Rfl:     Magnesium 100 MG CAPS, Magnesium TABS  Refills: 0  Active, Disp: , Rfl:     Omega-3 Fatty Acids (FISH OIL) 645 MG CAPS, Fish Oil CAPS  Refills: 0  Active, Disp: , Rfl:     Potassium 95 MG TABS, Potassium TABS  Refills: 0  Active, Disp: , Rfl:     telmisartan (MICARDIS) 80 MG tablet, Micardis 80 MG Oral Tablet  Refills: 0  Active, Disp: , Rfl:      There is no problem list on file for this  patient.            Patient ID: Neisha Salazar is a 78 y.o. female.     HPI     The following portions of the patient's history were reviewed and updated as appropriate: allergies, current medications, past family history, past medical history, past social history, past surgical history and problem list.        Objective:  Patient's shoes and socks removed.   Foot ExamPhysical Exam             Physical Exam  Left Foot: Appearance: Normal except as noted: excessive supination\R\R\b0pes cavus. Tenderness: None except the great toe,\R\r3ojmtdj longitudinal arch\R\R\s1hbfaqnjnq of the plantar fascia.   Right Foot: Appearance: Normal except as noted: excessive supination\R\R\b0pes cavus. Tenderness: None except the medial longitudinal arch\R\R\e7zyuzwpkjq of the plantar fascia.   Left Ankle: ROM: limited ROM in all planes   Right Ankle: ROM: limited ROM in all planes   Neurological Exam: performed. Light touch was intact bilaterally. Vibratory sensation was intact bilaterally. Response to monofilament test was intact bilaterally. Deep tendon reflexes: patellar reflex present bilaterally\R\R\w0arxqdjdv reflex present bilaterally.   Vascular Exam: performed Dorsalis pedis pulses were diminished bilaterally. Posterior tibial pulses were diminished bilaterally. Elevation Pallor: present bilaterally. Capillary refill time was greater than 3 seconds bilaterally\R\R\z2Nhiue 8 findings bilateral negative digital hair noted all mycotic nails debrided today. Edema: mild bilaterally.  These are Q, 9 findings bilateral  Toenails: All of the toenails were elongated,\R\b9bgryldojesyob,\R\b7ykfgqsqlzz,\R\d4uwbjgbz,\R\x0kgtxpv\R\R\c1Oreqrnj.   Hyperkeratosis: present on both first sub metatarsals.   Shoe Gear Evaluation: Recommendation(s): custom inlays\R\R\b0SAS style.                   Anesthesia Volume In Cc: 6

## 2024-03-22 ENCOUNTER — OFFICE VISIT (OUTPATIENT)
Dept: FAMILY MEDICINE CLINIC | Facility: CLINIC | Age: 79
End: 2024-03-22
Payer: MEDICARE

## 2024-03-22 VITALS
HEART RATE: 88 BPM | DIASTOLIC BLOOD PRESSURE: 76 MMHG | SYSTOLIC BLOOD PRESSURE: 132 MMHG | OXYGEN SATURATION: 97 % | BODY MASS INDEX: 34.04 KG/M2 | HEIGHT: 62 IN | WEIGHT: 185 LBS

## 2024-03-22 DIAGNOSIS — R35.0 URINARY FREQUENCY: Primary | ICD-10-CM

## 2024-03-22 DIAGNOSIS — E78.5 DYSLIPIDEMIA: ICD-10-CM

## 2024-03-22 DIAGNOSIS — R73.03 PREDIABETES: ICD-10-CM

## 2024-03-22 DIAGNOSIS — F32.A ANXIETY AND DEPRESSION: ICD-10-CM

## 2024-03-22 DIAGNOSIS — E66.09 CLASS 1 OBESITY DUE TO EXCESS CALORIES WITH SERIOUS COMORBIDITY AND BODY MASS INDEX (BMI) OF 33.0 TO 33.9 IN ADULT: ICD-10-CM

## 2024-03-22 DIAGNOSIS — F41.9 ANXIETY AND DEPRESSION: ICD-10-CM

## 2024-03-22 DIAGNOSIS — I10 PRIMARY HYPERTENSION: ICD-10-CM

## 2024-03-22 DIAGNOSIS — K64.8 OTHER HEMORRHOIDS: ICD-10-CM

## 2024-03-22 PROCEDURE — G2211 COMPLEX E/M VISIT ADD ON: HCPCS | Performed by: INTERNAL MEDICINE

## 2024-03-22 PROCEDURE — 99214 OFFICE O/P EST MOD 30 MIN: CPT | Performed by: INTERNAL MEDICINE

## 2024-03-22 RX ORDER — HYDROCORTISONE 25 MG/G
CREAM TOPICAL 2 TIMES DAILY
Qty: 28 G | Refills: 1 | Status: SHIPPED | OUTPATIENT
Start: 2024-03-22

## 2024-03-22 RX ORDER — OXYBUTYNIN CHLORIDE 5 MG/1
5 TABLET ORAL 3 TIMES DAILY
Qty: 30 TABLET | Refills: 1 | Status: SHIPPED | OUTPATIENT
Start: 2024-03-22 | End: 2024-03-26 | Stop reason: SDUPTHER

## 2024-03-22 RX ORDER — SULFAMETHOXAZOLE AND TRIMETHOPRIM 800; 160 MG/1; MG/1
1 TABLET ORAL 2 TIMES DAILY
Qty: 6 TABLET | Refills: 0 | Status: SHIPPED | OUTPATIENT
Start: 2024-03-22 | End: 2024-03-25

## 2024-03-22 NOTE — ASSESSMENT & PLAN NOTE
Patient with hemorrhoids has responded in the past to Anusol HC cream will renew the same on and off they are still bothering her.

## 2024-03-22 NOTE — ASSESSMENT & PLAN NOTE
Patient with a history of uterovaginal prolapse causing urgency with urination was seen by the urogynecologist and had undergone anterior wall native tissue repair posterior wall need to tissue repair perineorrhaphy and cystourethroscopy however she was told that these procedures may not help completely with the symptoms she has been experiencing with increased frequency.  However the increased frequency has been better until last night.  Will get urine analysis and culture she was prescribed in the past Myrbetriq but it was too expensive we will try her on oxybutynin and see if it makes a difference.

## 2024-03-22 NOTE — ASSESSMENT & PLAN NOTE
Again recommend very strongly to cut back on calorie intake lifestyle modification BMI is 33.84 now

## 2024-03-22 NOTE — ASSESSMENT & PLAN NOTE
Cholesterol 203 with HDL of 62 again recommended strict diet lifestyle modification will follow with repeat lab later if necessary start her on medication.

## 2024-03-22 NOTE — PROGRESS NOTES
Office Visit Note  24     Neisha Salazar 79 y.o. female MRN: [id number]  : 1945    Assessment:     1. Anxiety and depression  Assessment & Plan:  Patient occasionally takes Xanax for anxiety depression symptoms      2. Class 1 obesity due to excess calories with serious comorbidity and body mass index (BMI) of 33.0 to 33.9 in adult  Assessment & Plan:  Again recommend very strongly to cut back on calorie intake lifestyle modification BMI is 33.84 now      3. Dyslipidemia  Assessment & Plan:  Cholesterol 203 with HDL of 62 again recommended strict diet lifestyle modification will follow with repeat lab later if necessary start her on medication.      4. Other hemorrhoids  Assessment & Plan:  Patient with hemorrhoids has responded in the past to Anusol HC cream will renew the same on and off they are still bothering her.    Orders:  -     hydrocortisone (ANUSOL-HC) 2.5 % rectal cream; Apply topically 2 (two) times a day    5. Prediabetes  Assessment & Plan:  Emphasized again regarding diet exercise cutting back carbohydrate intake      6. Primary hypertension  Assessment & Plan:  Blood pressure is stable 132/76 currently not on any medication      7. Urinary frequency  Assessment & Plan:  Patient with a history of uterovaginal prolapse causing urgency with urination was seen by the urogynecologist and had undergone anterior wall native tissue repair posterior wall need to tissue repair perineorrhaphy and cystourethroscopy however she was told that these procedures may not help completely with the symptoms she has been experiencing with increased frequency.  However the increased frequency has been better until last night.  Will get urine analysis and culture she was prescribed in the past Myrbetriq but it was too expensive we will try her on oxybutynin and see if it makes a difference.    Orders:  -     sulfamethoxazole-trimethoprim (BACTRIM DS) 800-160 mg per tablet; Take 1 tablet by mouth 2 (two) times  a day for 3 days  -     oxybutynin (DITROPAN) 5 mg tablet; Take 1 tablet (5 mg total) by mouth 3 (three) times a day  -     UA w Reflex to Microscopic w Reflex to Culture; Future               Discussion Summary and Plan:  Today's care plan and medications were reviewed with patient in detail and all their questions answered to their satisfaction.    Chief Complaint   Patient presents with    Urinary Frequency      Subjective:  Patient is coming here for evaluation regarding her symptoms of increased frequency with urination started last night and has to go to the bathroom every 15 to 20 minutes fairly good amount at times.  Denies any symptoms of burning sensation pain discomfort while passing urine no fever no nausea vomiting no chills.  No pain in the flank area did have some discomfort in the lower abdomen.  History of having overactive bladder she was seen by the urogynecologist also she had procedures done.    Urinary Frequency   This is a recurrent problem. The current episode started yesterday. The problem occurs every urination. The problem has been unchanged. The pain is at a severity of 0/10. The patient is experiencing no pain. There has been no fever. She is Not sexually active. There is No history of pyelonephritis. Associated symptoms include frequency and urgency. Pertinent negatives include no chills, hematuria or vomiting. She has tried nothing for the symptoms.       The following portions of the patient's history were reviewed and updated as appropriate: allergies, current medications, past family history, past medical history, past social history, past surgical history and problem list.    Review of Systems   Constitutional:  Negative for chills and fever.   HENT:  Negative for ear pain and sore throat.    Eyes:  Negative for pain and visual disturbance.   Respiratory:  Negative for cough and shortness of breath.    Cardiovascular:  Negative for chest pain and palpitations.   Gastrointestinal:   Positive for constipation. Negative for abdominal pain and vomiting.   Genitourinary:  Positive for frequency and urgency. Negative for dysuria and hematuria.   Musculoskeletal:  Negative for arthralgias and back pain.   Skin:  Negative for color change and rash.   Neurological:  Negative for seizures and syncope.   All other systems reviewed and are negative.        Historical Information   Patient Active Problem List   Diagnosis    Primary hypertension    Class 1 obesity due to excess calories in adult    Dyslipidemia    Primary osteoarthritis involving multiple joints    Urinary frequency    Muscle cramps    Unsteady gait    Anxiety and depression    Other hemorrhoids    Uterovaginal prolapse    Urinary incontinence    Near syncope    Prediabetes     Past Medical History:   Diagnosis Date    Anxiety disorder     Arthritis     Bright red rectal bleeding     Colon polyp     DJD (degenerative joint disease)     Hernia A year ago    High cholesterol     Hyperactivity of bladder     Hypertension     Irritable bowel syndrome Last July    Macular degeneration     Obesity     Osteoporosis     Plantar fasciitis     Not sure of date     Past Surgical History:   Procedure Laterality Date    CARDIAC SURGERY      CHOLECYSTECTOMY      COLONOSCOPY      DXA PROCEDURE (HISTORICAL)  06/23/2021    INTRAOCULAR LENS INSERTION      MAMMO (HISTORICAL)  01/04/2022    TONSILLECTOMY      TOTAL SHOULDER REPLACEMENT Left      Social History     Substance and Sexual Activity   Alcohol Use No     Social History     Substance and Sexual Activity   Drug Use Never     Social History     Tobacco Use   Smoking Status Former    Current packs/day: 0.00    Types: Cigarettes    Passive exposure: Past   Smokeless Tobacco Never   Tobacco Comments    quit cigarettes age 27     Family History   Problem Relation Age of Onset    Hearing loss Mother     Esophageal cancer Father     Hypertension Sister     No Known Problems Sister     Esophageal cancer  Maternal Grandmother     Diabetes Paternal Grandmother     Breast cancer Maternal Aunt 70    No Known Problems Maternal Aunt     No Known Problems Maternal Aunt     No Known Problems Maternal Aunt     Skin cancer Paternal Aunt     No Known Problems Paternal Aunt     No Known Problems Paternal Aunt     Cancer Paternal Aunt     Breast cancer Cousin 77     Health Maintenance Due   Topic    Hepatitis C Screening     Pneumococcal Vaccine: 65+ Years (1 of 1 - PCV)    COVID-19 Vaccine (3 - 2023-24 season)    PT PLAN OF CARE       Meds/Allergies       Current Outpatient Medications:     ALPRAZolam (XANAX) 0.25 mg tablet, Take 1 tablet (0.25 mg total) by mouth 2 (two) times a day as needed for anxiety, Disp: 40 tablet, Rfl: 0    aspirin (ECOTRIN LOW STRENGTH) 81 mg EC tablet, Take 81 mg by mouth daily, Disp: , Rfl:     Biotin 1000 MCG tablet, Take 1,000 mcg by mouth 3 (three) times a day, Disp: , Rfl:     Calcium Acetate, Phos Binder, (CALCIUM ACETATE PO), Take 2 tablets by mouth daily CALCIUM CARBONATE/VITAMIN D3 (CALCIUM 600 WITH VITAMIN D3 ORAL), Disp: , Rfl:     Cranberry 200 MG CAPS, Take by mouth in the morning, Disp: , Rfl:     docusate sodium (COLACE) 50 mg capsule, Take by mouth, Disp: , Rfl:     Flaxseed, Linseed, (FLAX SEED OIL) 1000 MG CAPS, Flax Seed Oil 1000 MG Oral Capsule  Refills: 0  Active, Disp: , Rfl:     fluticasone (FLONASE) 50 mcg/act nasal spray, 2 sprays into each nostril daily, Disp: 11.1 mL, Rfl: 1    hydrocortisone (ANUSOL-HC) 2.5 % rectal cream, Apply topically 2 (two) times a day, Disp: 28 g, Rfl: 1    Multiple Vitamins-Minerals (PRESERVISION AREDS 2 PO), Take by mouth, Disp: , Rfl:     NATURAL PSYLLIUM SEED PO, Take 1 tablet by mouth every evening, Disp: , Rfl:     Omega-3 Fatty Acids (FISH OIL) 645 MG CAPS, Fish Oil CAPS  Refills: 0  Active, Disp: , Rfl:     oxybutynin (DITROPAN) 5 mg tablet, Take 1 tablet (5 mg total) by mouth 3 (three) times a day, Disp: 30 tablet, Rfl: 1    Potassium 95 MG  "TABS, Potassium TABS  Refills: 0  Active, Disp: , Rfl:     sulfamethoxazole-trimethoprim (BACTRIM DS) 800-160 mg per tablet, Take 1 tablet by mouth 2 (two) times a day for 3 days, Disp: 6 tablet, Rfl: 0    TURMERIC PO, Take 1 tablet by mouth every evening, Disp: , Rfl:     vitamin B-12 (VITAMIN B-12) 1,000 mcg tablet, Take 1 tablet (1,000 mcg total) by mouth daily, Disp: 30 tablet, Rfl: 2      Objective:    Vitals:   /76 (BP Location: Right arm, Patient Position: Sitting, Cuff Size: Standard)   Pulse 88   Ht 5' 2\" (1.575 m)   Wt 83.9 kg (185 lb)   SpO2 97%   BMI 33.84 kg/m²   Body mass index is 33.84 kg/m².  Vitals:    03/22/24 1102   Weight: 83.9 kg (185 lb)       Physical Exam  Vitals and nursing note reviewed.   Constitutional:       Appearance: Normal appearance.   Cardiovascular:      Rate and Rhythm: Normal rate and regular rhythm.      Heart sounds: Normal heart sounds.   Pulmonary:      Effort: Pulmonary effort is normal.      Breath sounds: Normal breath sounds.   Abdominal:      General: There is no distension.      Palpations: Abdomen is soft.      Tenderness: There is no abdominal tenderness. There is no right CVA tenderness or left CVA tenderness.   Musculoskeletal:      Cervical back: Normal range of motion and neck supple.      Right lower leg: No edema.      Left lower leg: No edema.   Skin:     General: Skin is warm and dry.   Neurological:      Mental Status: She is alert and oriented to person, place, and time.   Psychiatric:         Mood and Affect: Mood normal.         Behavior: Behavior normal.         Lab Review   No visits with results within 2 Month(s) from this visit.   Latest known visit with results is:   Appointment on 12/19/2023   Component Date Value Ref Range Status    WBC 12/19/2023 5.49  4.31 - 10.16 Thousand/uL Final    RBC 12/19/2023 4.16  3.81 - 5.12 Million/uL Final    Hemoglobin 12/19/2023 12.6  11.5 - 15.4 g/dL Final    Hematocrit 12/19/2023 40.2  34.8 - 46.1 % " Final    MCV 12/19/2023 97  82 - 98 fL Final    MCH 12/19/2023 30.3  26.8 - 34.3 pg Final    MCHC 12/19/2023 31.3 (L)  31.4 - 37.4 g/dL Final    RDW 12/19/2023 14.2  11.6 - 15.1 % Final    MPV 12/19/2023 11.0  8.9 - 12.7 fL Final    Platelets 12/19/2023 297  149 - 390 Thousands/uL Final    nRBC 12/19/2023 0  /100 WBCs Final    Neutrophils Relative 12/19/2023 57  43 - 75 % Final    Immature Grans % 12/19/2023 0  0 - 2 % Final    Lymphocytes Relative 12/19/2023 30  14 - 44 % Final    Monocytes Relative 12/19/2023 9  4 - 12 % Final    Eosinophils Relative 12/19/2023 3  0 - 6 % Final    Basophils Relative 12/19/2023 1  0 - 1 % Final    Neutrophils Absolute 12/19/2023 3.14  1.85 - 7.62 Thousands/µL Final    Absolute Immature Grans 12/19/2023 0.02  0.00 - 0.20 Thousand/uL Final    Absolute Lymphocytes 12/19/2023 1.64  0.60 - 4.47 Thousands/µL Final    Absolute Monocytes 12/19/2023 0.47  0.17 - 1.22 Thousand/µL Final    Eosinophils Absolute 12/19/2023 0.17  0.00 - 0.61 Thousand/µL Final    Basophils Absolute 12/19/2023 0.05  0.00 - 0.10 Thousands/µL Final    Hemoglobin A1C 12/19/2023 5.8 (H)  Normal 4.0-5.6%; PreDiabetic 5.7-6.4%; Diabetic >=6.5%; Glycemic control for adults with diabetes <7.0% % Final    EAG 12/19/2023 120  mg/dl Final    Sodium 12/19/2023 140  135 - 147 mmol/L Final    Potassium 12/19/2023 4.5  3.5 - 5.3 mmol/L Final    Chloride 12/19/2023 104  96 - 108 mmol/L Final    CO2 12/19/2023 28  21 - 32 mmol/L Final    ANION GAP 12/19/2023 8  mmol/L Final    BUN 12/19/2023 16  5 - 25 mg/dL Final    Creatinine 12/19/2023 0.89  0.60 - 1.30 mg/dL Final    Standardized to IDMS reference method    Glucose, Fasting 12/19/2023 100 (H)  65 - 99 mg/dL Final    Calcium 12/19/2023 9.2  8.4 - 10.2 mg/dL Final    AST 12/19/2023 12 (L)  13 - 39 U/L Final    ALT 12/19/2023 10  7 - 52 U/L Final    Specimen collection should occur prior to Sulfasalazine administration due to the potential for falsely depressed results.      Alkaline Phosphatase 12/19/2023 68  34 - 104 U/L Final    Total Protein 12/19/2023 6.4  6.4 - 8.4 g/dL Final    Albumin 12/19/2023 3.6  3.5 - 5.0 g/dL Final    Total Bilirubin 12/19/2023 0.40  0.20 - 1.00 mg/dL Final    Use of this assay is not recommended for patients undergoing treatment with eltrombopag due to the potential for falsely elevated results.  N-acetyl-p-benzoquinone imine (metabolite of Acetaminophen) will generate erroneously low results in samples for patients that have taken an overdose of Acetaminophen.    eGFR 12/19/2023 62  ml/min/1.73sq m Final    Cholesterol 12/19/2023 203 (H)  See Comment mg/dL Final    Cholesterol:         Pediatric <18 Years        Desirable          <170 mg/dL      Borderline High    170-199 mg/dL      High               >=200 mg/dL        Adult >=18 Years            Desirable         <200 mg/dL      Borderline High   200-239 mg/dL      High              >239 mg/dL      Triglycerides 12/19/2023 79  See Comment mg/dL Final    Triglyceride:     0-9Y            <75mg/dL     10Y-17Y         <90 mg/dL       >=18Y     Normal          <150 mg/dL     Borderline High 150-199 mg/dL     High            200-499 mg/dL        Very High       >499 mg/dL    Specimen collection should occur prior to Metamizole administration due to the potential for falsely depressed results.    HDL, Direct 12/19/2023 62  >=50 mg/dL Final    LDL Calculated 12/19/2023 125 (H)  0 - 100 mg/dL Final    LDL Cholesterol:     Optimal           <100 mg/dl     Near Optimal      100-129 mg/dl     Above Optimal       Borderline High 130-159 mg/dl       High            160-189 mg/dl       Very High       >189 mg/dl         This screening LDL is a calculated result.   It does not have the accuracy of the Direct Measured LDL in the monitoring of patients with hyperlipidemia and/or statin therapy.   Direct Measure LDL (ORB734) must be ordered separately in these patients.    Non-HDL-Chol (CHOL-HDL) 12/19/2023 141  mg/dl  "Final    TSH 3RD GENERATON 12/19/2023 1.800  0.450 - 4.500 uIU/mL Final    The recommended reference ranges for TSH during pregnancy are as follows:   First trimester 0.100 to 2.500 uIU/mL   Second trimester  0.200 to 3.000 uIU/mL   Third trimester 0.300 to 3.000 uIU/m    Note: Normal ranges may not apply to patients who are transgender, non-binary, or whose legal sex, sex at birth, and gender identity differ.  Adult TSH (3rd generation) reference range follows the recommended guidelines of the [employer], January, 2020.    Vit D, 25-Hydroxy 12/19/2023 85.1  30.0 - 100.0 ng/mL Final    Vitamin D guidelines established by Clinical Guidelines Subcommittee  of the Endocrine Society Task Force, 2011    Deficiency <20ng/ml   Insufficiency 20-30ng/ml   Sufficient  ng/ml          Tia Warren MD        \"This note has been constructed using a voice recognition system.Therefore there may be syntax, spelling, and/or grammatical errors. Please call if you have any questions. \"  "

## 2024-03-23 ENCOUNTER — APPOINTMENT (OUTPATIENT)
Dept: LAB | Facility: HOSPITAL | Age: 79
End: 2024-03-23
Payer: MEDICARE

## 2024-03-23 DIAGNOSIS — R35.0 URINARY FREQUENCY: ICD-10-CM

## 2024-03-23 LAB
BILIRUB UR QL STRIP: NEGATIVE
CLARITY UR: NORMAL
COLOR UR: YELLOW
GLUCOSE UR STRIP-MCNC: NEGATIVE MG/DL
HGB UR QL STRIP.AUTO: NEGATIVE
KETONES UR STRIP-MCNC: NEGATIVE MG/DL
LEUKOCYTE ESTERASE UR QL STRIP: NEGATIVE
NITRITE UR QL STRIP: NEGATIVE
PH UR STRIP.AUTO: 7.5 [PH]
PROT UR STRIP-MCNC: NEGATIVE MG/DL
SP GR UR STRIP.AUTO: 1.01 (ref 1–1.03)
UROBILINOGEN UR QL STRIP.AUTO: 0.2 E.U./DL

## 2024-03-23 PROCEDURE — 81003 URINALYSIS AUTO W/O SCOPE: CPT

## 2024-03-25 NOTE — PROGRESS NOTES
Office Visit Note  24     Neisha Salazar 79 y.o. female MRN: [id number]  : 1945    Assessment:     1. Urinary frequency  Assessment & Plan:  Patient with overactive bladder history of uterovaginal prolapse causing urgency with urination was seen by the urogynecologist in the past and underwent procedures she recently started having increased frequency with urination we started her on Ditropan appears to be helping.  She could not afford the Myrbetriq which was prescribed in the past and did not fill it up.  Currently patient is taking oxybutynin 5 mg 3 times a day.  Currently her urination with frequency is more than the nighttime than in the daytime we will adjust the dose of the medication to 5 mg in the morning 5 in the afternoon and 10 mg at bedtime.  Will also refer the patient to the urologist.    Orders:  -     oxybutynin (DITROPAN) 5 mg tablet; Take 1 tablet (5 mg total) by mouth 3 (three) times a day  -     Ambulatory referral to Urology; Future    2. Primary hypertension  Assessment & Plan:  Blood pressure stable 130/74 patient is currently not on any medications we will continue to monitor      3. Anxiety and depression  Assessment & Plan:  Occasionally patient takes Xanax as needed      4. Class 1 obesity due to excess calories with serious comorbidity and body mass index (BMI) of 33.0 to 33.9 in adult  Assessment & Plan:  Diet exercise lifestyle modification emphasized      5. Other hemorrhoids    6. Dyslipidemia    7. Prediabetes    8. Uterovaginal prolapse  Assessment & Plan:  Patient's status post anterior wall native tissue repair, posterior wall native tissue repair, perineorrhaphy and cystourethroscopy will have her follow-up with the urologist also            Urinary Incontinence Plan of Care: counseling topics discussed: practice Kegel (pelvic floor strengthening) exercises. Referral was placed for Urology. Patient with increased frequency with urination secondary to overactive  bladder responding to the oxybutynin recommend patient Kegel exercises will also refer the patient to the urologist..           Discussion Summary and Plan:  Today's care plan and medications were reviewed with patient in detail and all their questions answered to their satisfaction.    Chief Complaint   Patient presents with    Follow-up      Subjective:  Patient is coming here for a follow-up evaluation regarding increased frequency with urination for which we have prescribed her Ditropan 5 mg 3 times a day first today it was not but again she is having increased frequency in the night starting yesterday but during the daytime the pills appears to be working.  Her urine analysis came back normal.  Patient complains of mild sore throat exam is unremarkable slight congestion noted.        The following portions of the patient's history were reviewed and updated as appropriate: allergies, current medications, past family history, past medical history, past social history, past surgical history and problem list.    Review of Systems   Constitutional:  Negative for chills and fever.   HENT:  Negative for ear pain and sore throat.    Eyes:  Negative for pain and visual disturbance.   Respiratory:  Negative for cough and shortness of breath.    Cardiovascular:  Negative for chest pain and palpitations.   Gastrointestinal:  Negative for abdominal pain and vomiting.   Genitourinary:  Positive for frequency and urgency. Negative for dysuria and hematuria.   Musculoskeletal:  Negative for arthralgias and back pain.   Skin:  Negative for color change and rash.   Neurological:  Negative for seizures and syncope.   All other systems reviewed and are negative.        Historical Information   Patient Active Problem List   Diagnosis    Primary hypertension    Class 1 obesity due to excess calories in adult    Dyslipidemia    Primary osteoarthritis involving multiple joints    Urinary frequency    Muscle cramps    Unsteady gait     Anxiety and depression    Other hemorrhoids    Uterovaginal prolapse    Urinary incontinence    Near syncope    Prediabetes     Past Medical History:   Diagnosis Date    Anxiety disorder     Arthritis     Bright red rectal bleeding     Colon polyp     DJD (degenerative joint disease)     Hernia A year ago    High cholesterol     Hyperactivity of bladder     Hypertension     Irritable bowel syndrome Last July    Macular degeneration     Obesity     Osteoporosis     Plantar fasciitis     Not sure of date     Past Surgical History:   Procedure Laterality Date    CARDIAC SURGERY      CHOLECYSTECTOMY      COLONOSCOPY      DXA PROCEDURE (HISTORICAL)  06/23/2021    INTRAOCULAR LENS INSERTION      MAMMO (HISTORICAL)  01/04/2022    TONSILLECTOMY      TOTAL SHOULDER REPLACEMENT Left      Social History     Substance and Sexual Activity   Alcohol Use No     Social History     Substance and Sexual Activity   Drug Use Never     Social History     Tobacco Use   Smoking Status Former    Current packs/day: 0.00    Types: Cigarettes    Passive exposure: Past   Smokeless Tobacco Never   Tobacco Comments    quit cigarettes age 27     Family History   Problem Relation Age of Onset    Hearing loss Mother     Esophageal cancer Father     Hypertension Sister     No Known Problems Sister     Esophageal cancer Maternal Grandmother     Diabetes Paternal Grandmother     Breast cancer Maternal Aunt 70    No Known Problems Maternal Aunt     No Known Problems Maternal Aunt     No Known Problems Maternal Aunt     Skin cancer Paternal Aunt     No Known Problems Paternal Aunt     No Known Problems Paternal Aunt     Cancer Paternal Aunt     Breast cancer Cousin 77     Health Maintenance Due   Topic    Hepatitis C Screening     Pneumococcal Vaccine: 65+ Years (1 of 1 - PCV)    COVID-19 Vaccine (3 - 2023-24 season)    PT PLAN OF CARE       Meds/Allergies       Current Outpatient Medications:     ALPRAZolam (XANAX) 0.25 mg tablet, Take 1 tablet (0.25  "mg total) by mouth 2 (two) times a day as needed for anxiety, Disp: 40 tablet, Rfl: 0    aspirin (ECOTRIN LOW STRENGTH) 81 mg EC tablet, Take 81 mg by mouth daily, Disp: , Rfl:     Biotin 1000 MCG tablet, Take 1,000 mcg by mouth 3 (three) times a day, Disp: , Rfl:     Calcium Acetate, Phos Binder, (CALCIUM ACETATE PO), Take 2 tablets by mouth daily CALCIUM CARBONATE/VITAMIN D3 (CALCIUM 600 WITH VITAMIN D3 ORAL), Disp: , Rfl:     Cranberry 200 MG CAPS, Take by mouth in the morning, Disp: , Rfl:     docusate sodium (COLACE) 50 mg capsule, Take by mouth, Disp: , Rfl:     Flaxseed, Linseed, (FLAX SEED OIL) 1000 MG CAPS, Flax Seed Oil 1000 MG Oral Capsule  Refills: 0  Active, Disp: , Rfl:     fluticasone (FLONASE) 50 mcg/act nasal spray, 2 sprays into each nostril daily, Disp: 11.1 mL, Rfl: 1    hydrocortisone (ANUSOL-HC) 2.5 % rectal cream, Apply topically 2 (two) times a day, Disp: 28 g, Rfl: 1    Multiple Vitamins-Minerals (PRESERVISION AREDS 2 PO), Take by mouth, Disp: , Rfl:     NATURAL PSYLLIUM SEED PO, Take 1 tablet by mouth every evening, Disp: , Rfl:     Omega-3 Fatty Acids (FISH OIL) 645 MG CAPS, Fish Oil CAPS  Refills: 0  Active, Disp: , Rfl:     oxybutynin (DITROPAN) 5 mg tablet, Take 1 tablet (5 mg total) by mouth 3 (three) times a day, Disp: 60 tablet, Rfl: 1    Potassium 95 MG TABS, Potassium TABS  Refills: 0  Active, Disp: , Rfl:     TURMERIC PO, Take 1 tablet by mouth every evening, Disp: , Rfl:     vitamin B-12 (VITAMIN B-12) 1,000 mcg tablet, Take 1 tablet (1,000 mcg total) by mouth daily, Disp: 30 tablet, Rfl: 2      Objective:    Vitals:   /74 (BP Location: Right arm, Patient Position: Sitting, Cuff Size: Standard)   Pulse 68   Ht 5' 2\" (1.575 m)   Wt 83 kg (183 lb)   SpO2 97%   BMI 33.47 kg/m²   Body mass index is 33.47 kg/m².  Vitals:    03/26/24 1303   Weight: 83 kg (183 lb)       Physical Exam  Vitals and nursing note reviewed.   Constitutional:       Appearance: Normal appearance. " "  Cardiovascular:      Rate and Rhythm: Normal rate and regular rhythm.      Heart sounds: Normal heart sounds.   Pulmonary:      Effort: Pulmonary effort is normal.      Breath sounds: Normal breath sounds.   Abdominal:      Palpations: Abdomen is soft.   Musculoskeletal:      Cervical back: Normal range of motion and neck supple.      Right lower leg: No edema.      Left lower leg: No edema.   Skin:     General: Skin is warm and dry.   Neurological:      Mental Status: She is alert and oriented to person, place, and time.         Lab Review   Appointment on 03/23/2024   Component Date Value Ref Range Status    Color, UA 03/23/2024 Yellow   Final    Clarity, UA 03/23/2024 Cloudy   Final    Specific Gravity, UA 03/23/2024 1.015  1.000 - 1.030 Final    pH, UA 03/23/2024 7.5  5.0, 5.5, 6.0, 6.5, 7.0, 7.5, 8.0, 8.5, 9.0 Final    Leukocytes, UA 03/23/2024 Negative  Negative Final    Nitrite, UA 03/23/2024 Negative  Negative Final    Protein, UA 03/23/2024 Negative  Negative mg/dl Final    Glucose, UA 03/23/2024 Negative  Negative mg/dl Final    Ketones, UA 03/23/2024 Negative  Negative mg/dl Final    Urobilinogen, UA 03/23/2024 0.2  0.2, 1.0 E.U./dl E.U./dl Final    Bilirubin, UA 03/23/2024 Negative  Negative Final    Occult Blood, UA 03/23/2024 Negative  Negative Final         Tia Warren MD        \"This note has been constructed using a voice recognition system.Therefore there may be syntax, spelling, and/or grammatical errors. Please call if you have any questions. \"  "

## 2024-03-26 ENCOUNTER — OFFICE VISIT (OUTPATIENT)
Dept: FAMILY MEDICINE CLINIC | Facility: CLINIC | Age: 79
End: 2024-03-26
Payer: MEDICARE

## 2024-03-26 VITALS
OXYGEN SATURATION: 97 % | HEART RATE: 68 BPM | SYSTOLIC BLOOD PRESSURE: 130 MMHG | WEIGHT: 183 LBS | DIASTOLIC BLOOD PRESSURE: 74 MMHG | HEIGHT: 62 IN | BODY MASS INDEX: 33.68 KG/M2

## 2024-03-26 DIAGNOSIS — N81.4 UTEROVAGINAL PROLAPSE: ICD-10-CM

## 2024-03-26 DIAGNOSIS — R35.0 URINARY FREQUENCY: Primary | ICD-10-CM

## 2024-03-26 DIAGNOSIS — F32.A ANXIETY AND DEPRESSION: ICD-10-CM

## 2024-03-26 DIAGNOSIS — F41.9 ANXIETY AND DEPRESSION: ICD-10-CM

## 2024-03-26 DIAGNOSIS — K64.8 OTHER HEMORRHOIDS: ICD-10-CM

## 2024-03-26 DIAGNOSIS — I10 PRIMARY HYPERTENSION: ICD-10-CM

## 2024-03-26 DIAGNOSIS — R73.03 PREDIABETES: ICD-10-CM

## 2024-03-26 DIAGNOSIS — E78.5 DYSLIPIDEMIA: ICD-10-CM

## 2024-03-26 DIAGNOSIS — E66.09 CLASS 1 OBESITY DUE TO EXCESS CALORIES WITH SERIOUS COMORBIDITY AND BODY MASS INDEX (BMI) OF 33.0 TO 33.9 IN ADULT: ICD-10-CM

## 2024-03-26 PROCEDURE — 99213 OFFICE O/P EST LOW 20 MIN: CPT | Performed by: INTERNAL MEDICINE

## 2024-03-26 PROCEDURE — G2211 COMPLEX E/M VISIT ADD ON: HCPCS | Performed by: INTERNAL MEDICINE

## 2024-03-26 RX ORDER — OXYBUTYNIN CHLORIDE 5 MG/1
5 TABLET ORAL 3 TIMES DAILY
Qty: 60 TABLET | Refills: 1 | Status: SHIPPED | OUTPATIENT
Start: 2024-03-26

## 2024-03-26 NOTE — ASSESSMENT & PLAN NOTE
Patient's status post anterior wall native tissue repair, posterior wall native tissue repair, perineorrhaphy and cystourethroscopy will have her follow-up with the urologist also

## 2024-03-26 NOTE — ASSESSMENT & PLAN NOTE
Patient with overactive bladder history of uterovaginal prolapse causing urgency with urination was seen by the urogynecologist in the past and underwent procedures she recently started having increased frequency with urination we started her on Ditropan appears to be helping.  She could not afford the Myrbetriq which was prescribed in the past and did not fill it up.  Currently patient is taking oxybutynin 5 mg 3 times a day.  Currently her urination with frequency is more than the nighttime than in the daytime we will adjust the dose of the medication to 5 mg in the morning 5 in the afternoon and 10 mg at bedtime.  Will also refer the patient to the urologist.

## 2024-03-26 NOTE — ASSESSMENT & PLAN NOTE
Blood pressure stable 130/74 patient is currently not on any medications we will continue to monitor

## 2024-04-03 ENCOUNTER — TELEPHONE (OUTPATIENT)
Dept: NEUROLOGY | Facility: CLINIC | Age: 79
End: 2024-04-03

## 2024-04-03 DIAGNOSIS — E53.8 B12 DEFICIENCY: ICD-10-CM

## 2024-04-03 RX ORDER — LANOLIN ALCOHOL/MO/W.PET/CERES
1000 CREAM (GRAM) TOPICAL DAILY
Qty: 30 TABLET | Refills: 2 | Status: SHIPPED | OUTPATIENT
Start: 2024-04-03

## 2024-04-09 ENCOUNTER — OFFICE VISIT (OUTPATIENT)
Dept: NEUROLOGY | Facility: CLINIC | Age: 79
End: 2024-04-09
Payer: MEDICARE

## 2024-04-09 VITALS
OXYGEN SATURATION: 96 % | DIASTOLIC BLOOD PRESSURE: 86 MMHG | HEIGHT: 62 IN | TEMPERATURE: 97.5 F | WEIGHT: 180 LBS | HEART RATE: 73 BPM | SYSTOLIC BLOOD PRESSURE: 128 MMHG | BODY MASS INDEX: 33.13 KG/M2

## 2024-04-09 DIAGNOSIS — M51.36 DDD (DEGENERATIVE DISC DISEASE), LUMBAR: Primary | ICD-10-CM

## 2024-04-09 DIAGNOSIS — E53.8 VITAMIN B12 DEFICIENCY: ICD-10-CM

## 2024-04-09 DIAGNOSIS — R26.81 UNSTEADY GAIT WHEN WALKING: ICD-10-CM

## 2024-04-09 PROCEDURE — 99214 OFFICE O/P EST MOD 30 MIN: CPT | Performed by: PSYCHIATRY & NEUROLOGY

## 2024-04-09 NOTE — PROGRESS NOTES
Return NeuroOutpatient Note        Neisha Salazar  [id number]  79 y.o.  1945       Lumbar disc disease  and Gait Problem        History obtained from:  Patient     HPI/Subjective:    Neisha Salazar is a 78 yo F with PMH of lumbar DDD presents as f/u. Initially she thought she was referred here for abnormal mri brain in May. It only shows age associated small vessel disease and dilated perivascular space in lower BG of unclear clinical significance. Patient does report some difficulty walking. She would feel off balance. She tried PT for sometime but that hasn't helped. She denies shuffling. No associated tremor. Denies any difficulty lifting her feet. Patient states that since her shoulder surgery in 2013, she would have problem climbing up stairs.   Today her BP is better. Her PCP did d/c some of antihypertensives.   Patient denies any pain in lower back.   Her Vit B12 level was very low at 109. She is taking supplements.   We had ordered MRI LS spine which had revealed facet arthropathy. Anterolaterally directed synovial cyst from the right facet complex measuring 9 x 4 mm that comes in close proximity to the nerve root without impingement. There is a 4 mm anterior directed synovial cyst from the left facet complex that contacts the exiting left L4 nerve root. Larger posteriorly directed synovial cyst from the left facet complex measuring 1 cm. Mild canal stenosis. Lateral recess narrowing. Mild foraminal stenosis.  She's not symptomatic from this so we have deferred referral to pain management.    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       Past Medical History:   Diagnosis Date   • Anxiety disorder    • Arthritis    • Bright red rectal bleeding    • Colon polyp    • DJD (degenerative joint disease)    • Hernia A year ago   • High cholesterol    • Hyperactivity of bladder    • Hypertension    • Irritable bowel syndrome Last July    • Macular degeneration    • Obesity    • Osteoporosis    • Plantar fasciitis     Not sure of date     Social History     Socioeconomic History   • Marital status: /Civil Union     Spouse name: Not on file   • Number of children: Not on file   • Years of education: Not on file   • Highest education level: Not on file   Occupational History   • Not on file   Tobacco Use   • Smoking status: Former     Current packs/day: 0.00     Types: Cigarettes     Passive exposure: Past   • Smokeless tobacco: Never   • Tobacco comments:     quit cigarettes age 27   Vaping Use   • Vaping status: Never Used   Substance and Sexual Activity   • Alcohol use: No   • Drug use: Never   • Sexual activity: Not Currently     Partners: Male     Birth control/protection: Male Sterilization   Other Topics Concern   • Not on file   Social History Narrative   • Not on file     Social Determinants of Health     Financial Resource Strain: Low Risk  (10/10/2023)    Overall Financial Resource Strain (CARDIA)    • Difficulty of Paying Living Expenses: Not very hard   Food Insecurity: Not on file   Transportation Needs: No Transportation Needs (10/10/2023)    PRAPARE - Transportation    • Lack of Transportation (Medical): No    • Lack of Transportation (Non-Medical): No   Physical Activity: Not on file   Stress: Not on file   Social Connections: Not on file   Intimate Partner Violence: Not on file   Housing Stability: Not on file     Family History   Problem Relation Age of Onset   • Hearing loss Mother    • Esophageal cancer Father    • Hypertension Sister    • No Known Problems Sister    • Esophageal cancer Maternal Grandmother    • Diabetes Paternal Grandmother    • Breast cancer Maternal Aunt 70   • No Known Problems Maternal Aunt    • No Known Problems Maternal Aunt    • No Known Problems Maternal Aunt    • Skin cancer Paternal Aunt    • No Known Problems Paternal Aunt    • No Known Problems Paternal Aunt    • Cancer Paternal Aunt    •  Breast cancer Cousin 77     Allergies   Allergen Reactions   • Zithromax [Azithromycin] Swelling   • Atorvastatin Other (See Comments)     legs get stiff   • Erythromycin Base Palpitations     Current Outpatient Medications on File Prior to Visit   Medication Sig Dispense Refill   • ALPRAZolam (XANAX) 0.25 mg tablet Take 1 tablet (0.25 mg total) by mouth 2 (two) times a day as needed for anxiety 40 tablet 0   • aspirin (ECOTRIN LOW STRENGTH) 81 mg EC tablet Take 81 mg by mouth daily     • Biotin 1000 MCG tablet Take 1,000 mcg by mouth daily     • Calcium Acetate, Phos Binder, (CALCIUM ACETATE PO) Take 2 tablets by mouth daily CALCIUM CARBONATE/VITAMIN D3 (CALCIUM 600 WITH VITAMIN D3 ORAL)     • Cranberry 200 MG CAPS Take by mouth in the morning     • docusate sodium (COLACE) 50 mg capsule Take by mouth     • Flaxseed, Linseed, (FLAX SEED OIL) 1000 MG CAPS Flax Seed Oil 1000 MG Oral Capsule   Refills: 0    Active     • hydrocortisone (ANUSOL-HC) 2.5 % rectal cream Apply topically 2 (two) times a day 28 g 1   • Multiple Vitamins-Minerals (PRESERVISION AREDS 2 PO) Take by mouth 2 (two) times a day     • NATURAL PSYLLIUM SEED PO Take 1 tablet by mouth every evening     • Omega-3 Fatty Acids (FISH OIL) 645 MG CAPS Fish Oil CAPS   Refills: 0    Active     • oxybutynin (DITROPAN) 5 mg tablet Take 1 tablet (5 mg total) by mouth 3 (three) times a day 60 tablet 1   • Potassium 95 MG TABS Potassium TABS   Refills: 0    Active     • TURMERIC PO Take 1 tablet by mouth every evening     • vitamin B-12 (VITAMIN B-12) 1,000 mcg tablet Take 1 tablet (1,000 mcg total) by mouth daily 30 tablet 2   • fluticasone (FLONASE) 50 mcg/act nasal spray 2 sprays into each nostril daily (Patient not taking: Reported on 4/9/2024) 11.1 mL 1     No current facility-administered medications on file prior to visit.         Review of Systems   Refer to positive review of systems in HPI.   Review of Systems    Constitutional- No fever  Eyes- No visual  "change  ENT- Hearing normal  CV- No chest pain  Resp- No Shortness of breath  GI- No diarrhea  - Bladder normal  MS- No Arthritis   Skin- No rash  Psych- No depression  Endo- No DM  Heme- No nodes    Vitals:    04/09/24 1456   BP: 128/86   BP Location: Left arm   Patient Position: Sitting   Cuff Size: Standard   Pulse: 73   Temp: 97.5 °F (36.4 °C)   TempSrc: Tympanic   SpO2: 96%   Weight: 81.6 kg (180 lb)   Height: 5' 2\" (1.575 m)       PHYSICAL EXAM:  Appearance: No Acute Distress  Ophthalmoscopic: Disc Flat, Normal fundus  Mental status:  Orientation: Awake, Alert, and Orientedx3  Memory: Registation 3/3 Recall 3/3  Attention: normal  Knowledge: good  Language: No aphasia  Speech: No dysarthria  Cranial Nerves:  2 No Visual Defect on Confrontation, Pupils round, equal, reactive to light  3,4,6 Extraocular Movements Intact, no nystagmus  5 Facial Sensation Intact  7 No facial asymmetry  8 Intact hearing  9,10 Palate symmetric, normal gag  11 Good shoulder shrug  12 Tongue Midline  Gait: Stable, independent   Coordination: No ataxia with finger to nose testing, and heel to shin  Sensory: Intact, Symmetric to pinprick, light touch, vibration, and joint position  Muscle Tone: Normal              Muscle exam:  Arm Right Left Leg Right Left   Deltoid 5/5 5/5 Iliopsoas 5/5 5/5   Biceps 5/5 5/5 Quads 5/5 5/5   Triceps 5/5 5/5 Hamstrings 5/5 5/5   Wrist Extension 5/5 5/5 Ankle Dorsi Flexion 5/5 5/5   Wrist Flexion 5/5 5/5 Ankle Plantar Flexion 5/5 5/5   Interossei 5/5 5/5 Ankle Eversion 5/5 5/5   APB 5/5 5/5 Ankle Inversion 5/5 5/5       Reflexes   RJ BJ TJ KJ AJ Plantars Paul's   Right 2+ 2+ 2+ 2+ 0 Downgoing Not present   Left 2+ 2+ 2+ 2+ 0 Downgoing Not present     Personal review of  Labs:                    Diagnoses and all orders for this visit:      1. DDD (degenerative disc disease), lumbar        2. Vitamin B12 deficiency  Vitamin B12      3. Unsteady gait when walking            Patient does have some " improvement in her gait.   She is to resume B12 supplements.   She does have significant arthritis in lower back but she's not symptomatic from this.               Total time of encounter:  30 min  More than 50% of the time was used in counseling and/or coordination of care  Extent of counseling and/or coordination of care        Olga Harmon MD  [facility]  [address] [place] [id number] [phone]

## 2024-04-10 ENCOUNTER — APPOINTMENT (OUTPATIENT)
Dept: LAB | Facility: CLINIC | Age: 79
End: 2024-04-10
Payer: MEDICARE

## 2024-04-10 DIAGNOSIS — E53.8 VITAMIN B12 DEFICIENCY: ICD-10-CM

## 2024-04-10 LAB — VIT B12 SERPL-MCNC: 605 PG/ML (ref 180–914)

## 2024-04-10 PROCEDURE — 82607 VITAMIN B-12: CPT

## 2024-04-10 PROCEDURE — 36415 COLL VENOUS BLD VENIPUNCTURE: CPT

## 2024-05-08 NOTE — PROGRESS NOTES
Office Visit Note  24     Neisha Salazar 79 y.o. female MRN: [id number]  : 1945    Assessment:     1. Dyslipidemia  Assessment & Plan:  Last cholesterol level was 203 we will get a repeat lab work done meanwhile continue with lifestyle modification lose weight      2. Urinary frequency  Assessment & Plan:  Patient currently taking oxybutynin 5 mg twice daily he is going to have an appointment with the urologist also for further evaluation and treatment with history of uterovaginal prolapse causing urgency with urination was seen in the past by the urogynecologist    Orders:  -     oxybutynin (DITROPAN) 5 mg tablet; Take 1 tablet (5 mg total) by mouth 3 (three) times a day    3. Class 1 obesity due to excess calories with serious comorbidity and body mass index (BMI) of 33.0 to 33.9 in adult  Assessment & Plan:  Emphasized regarding diet exercise lifestyle modification cutting back on calorie intake and lose weight      4. Near syncope  Assessment & Plan:  No more episodes of near syncope feeling we cut back on the blood pressure medication and stop it completely since it was running on the lower side patient is also taking B12 which also appears to be helping.      5. Other hemorrhoids  Assessment & Plan:  Patient with hemorrhoids which bothers her on and off we will renew her Anusol HC' cream    Orders:  -     hydrocortisone (ANUSOL-HC) 2.5 % rectal cream; Apply topically 2 (two) times a day    6. Anxiety and depression  Assessment & Plan:  Patient takes Xanax occasionally for anxiety symptoms none in the recent past we will monitor      7. Prediabetes  Assessment & Plan:  Follow-up with repeat labs      8. Primary hypertension  Assessment & Plan:  Today's blood pressure 124/72 not on any medication will monitor      9. Unsteady gait  Assessment & Plan:  MRI of the lumbar spine revealing arthritic changes still has some unsteadiness with her gait balance center evaluation and treatment did not help  her much according to the patient.      10. Uterovaginal prolapse  Assessment & Plan:  Patient is going to be seen by the urologist soon.                 Discussion Summary and Plan:  Today's care plan and medications were reviewed with patient in detail and all their questions answered to their satisfaction.    Chief Complaint   Patient presents with   • Follow-up      Subjective:  Patient has coming here for a follow-up evaluation with regards to symptoms of unsteadiness with gait and also lightheaded feeling we had to cut back on her blood pressure medication since they were running on the low side.  Currently she is not taking any blood pressure medication.  Denies any symptoms of chest pains palpitation shortness of breath.  Patient was recently seen by the neurologist also whose note appreciated.  Medications reviewed labs reviewed patient is on supplements reviewed        The following portions of the patient's history were reviewed and updated as appropriate: allergies, current medications, past family history, past medical history, past social history, past surgical history and problem list.    Review of Systems   Constitutional:  Negative for chills and fever.   HENT:  Negative for ear pain and sore throat.    Eyes:  Negative for pain and visual disturbance.   Respiratory:  Negative for cough and shortness of breath.    Cardiovascular:  Negative for chest pain and palpitations.   Gastrointestinal:  Negative for abdominal pain and vomiting.   Genitourinary:  Negative for dysuria and hematuria.   Musculoskeletal:  Positive for arthralgias. Negative for back pain.   Skin:  Negative for color change and rash.   Neurological:  Negative for seizures and syncope.   All other systems reviewed and are negative.        Historical Information   Patient Active Problem List   Diagnosis   • Primary hypertension   • Class 1 obesity due to excess calories in adult   • Dyslipidemia   • Primary osteoarthritis involving  multiple joints   • Urinary frequency   • Muscle cramps   • Unsteady gait   • Anxiety and depression   • Other hemorrhoids   • Uterovaginal prolapse   • Urinary incontinence   • Near syncope   • Prediabetes     Past Medical History:   Diagnosis Date   • Anxiety disorder    • Arthritis    • Bright red rectal bleeding    • Colon polyp    • DJD (degenerative joint disease)    • Hernia A year ago   • High cholesterol    • Hyperactivity of bladder    • Hypertension    • Irritable bowel syndrome Last July   • Macular degeneration    • Obesity    • Osteoporosis    • Plantar fasciitis     Not sure of date     Past Surgical History:   Procedure Laterality Date   • CARDIAC SURGERY     • CHOLECYSTECTOMY     • COLONOSCOPY     • DXA PROCEDURE (HISTORICAL)  06/23/2021   • INTRAOCULAR LENS INSERTION     • MAMMO (HISTORICAL)  01/04/2022   • TONSILLECTOMY     • TOTAL SHOULDER REPLACEMENT Left      Social History     Substance and Sexual Activity   Alcohol Use No     Social History     Substance and Sexual Activity   Drug Use Never     Social History     Tobacco Use   Smoking Status Former   • Current packs/day: 0.00   • Types: Cigarettes   • Passive exposure: Past   Smokeless Tobacco Never   Tobacco Comments    quit cigarettes age 27     Family History   Problem Relation Age of Onset   • Hearing loss Mother    • Esophageal cancer Father    • Hypertension Sister    • No Known Problems Sister    • Esophageal cancer Maternal Grandmother    • Diabetes Paternal Grandmother    • Breast cancer Maternal Aunt 70   • No Known Problems Maternal Aunt    • No Known Problems Maternal Aunt    • No Known Problems Maternal Aunt    • Skin cancer Paternal Aunt    • No Known Problems Paternal Aunt    • No Known Problems Paternal Aunt    • Cancer Paternal Aunt    • Breast cancer Cousin 77     Health Maintenance Due   Topic   • Hepatitis C Screening    • Pneumococcal Vaccine: 65+ Years (1 of 1 - PCV)   • COVID-19 Vaccine (3 - 2023-24 season)   • PT PLAN  "OF CARE    • Depression Screening       Meds/Allergies       Current Outpatient Medications:   •  ALPRAZolam (XANAX) 0.25 mg tablet, Take 1 tablet (0.25 mg total) by mouth 2 (two) times a day as needed for anxiety, Disp: 40 tablet, Rfl: 0  •  aspirin (ECOTRIN LOW STRENGTH) 81 mg EC tablet, Take 81 mg by mouth daily, Disp: , Rfl:   •  Biotin 1000 MCG tablet, Take 1,000 mcg by mouth daily, Disp: , Rfl:   •  Calcium Acetate, Phos Binder, (CALCIUM ACETATE PO), Take 2 tablets by mouth daily CALCIUM CARBONATE/VITAMIN D3 (CALCIUM 600 WITH VITAMIN D3 ORAL), Disp: , Rfl:   •  Cranberry 200 MG CAPS, Take by mouth in the morning, Disp: , Rfl:   •  docusate sodium (COLACE) 50 mg capsule, Take by mouth, Disp: , Rfl:   •  Flaxseed, Linseed, (FLAX SEED OIL) 1000 MG CAPS, Flax Seed Oil 1000 MG Oral Capsule  Refills: 0  Active, Disp: , Rfl:   •  hydrocortisone (ANUSOL-HC) 2.5 % rectal cream, Apply topically 2 (two) times a day, Disp: 28 g, Rfl: 1  •  Multiple Vitamins-Minerals (PRESERVISION AREDS 2 PO), Take by mouth 2 (two) times a day, Disp: , Rfl:   •  NATURAL PSYLLIUM SEED PO, Take 1 tablet by mouth every evening, Disp: , Rfl:   •  Omega-3 Fatty Acids (FISH OIL) 645 MG CAPS, Fish Oil CAPS  Refills: 0  Active, Disp: , Rfl:   •  oxybutynin (DITROPAN) 5 mg tablet, Take 1 tablet (5 mg total) by mouth 3 (three) times a day, Disp: 60 tablet, Rfl: 6  •  Potassium 95 MG TABS, Potassium TABS  Refills: 0  Active, Disp: , Rfl:   •  TURMERIC PO, Take 1 tablet by mouth every evening, Disp: , Rfl:   •  vitamin B-12 (VITAMIN B-12) 1,000 mcg tablet, Take 1 tablet (1,000 mcg total) by mouth daily, Disp: 30 tablet, Rfl: 2      Objective:    Vitals:   /72 (BP Location: Right arm, Patient Position: Sitting, Cuff Size: Large)   Pulse 68   Ht 5' 2\" (1.575 m)   Wt 82.1 kg (181 lb)   SpO2 97%   BMI 33.11 kg/m²   Body mass index is 33.11 kg/m².  Vitals:    05/09/24 1102   Weight: 82.1 kg (181 lb)       Physical Exam  Vitals and nursing note " "reviewed.   Constitutional:       Appearance: Normal appearance. She is obese.   Cardiovascular:      Rate and Rhythm: Normal rate and regular rhythm.      Heart sounds: Normal heart sounds.   Pulmonary:      Effort: Pulmonary effort is normal.      Breath sounds: Normal breath sounds.   Abdominal:      Palpations: Abdomen is soft.   Musculoskeletal:      Cervical back: Normal range of motion and neck supple.      Right lower leg: No edema.      Left lower leg: No edema.   Skin:     General: Skin is warm and dry.   Neurological:      Mental Status: She is alert and oriented to person, place, and time.   Psychiatric:         Mood and Affect: Mood normal.         Behavior: Behavior normal.         Lab Review   Appointment on 04/10/2024   Component Date Value Ref Range Status   • Vitamin B-12 04/10/2024 605  180 - 914 pg/mL Final   Appointment on 03/23/2024   Component Date Value Ref Range Status   • Color, UA 03/23/2024 Yellow   Final   • Clarity, UA 03/23/2024 Cloudy   Final   • Specific Gravity, UA 03/23/2024 1.015  1.000 - 1.030 Final   • pH, UA 03/23/2024 7.5  5.0, 5.5, 6.0, 6.5, 7.0, 7.5, 8.0, 8.5, 9.0 Final   • Leukocytes, UA 03/23/2024 Negative  Negative Final   • Nitrite, UA 03/23/2024 Negative  Negative Final   • Protein, UA 03/23/2024 Negative  Negative mg/dl Final   • Glucose, UA 03/23/2024 Negative  Negative mg/dl Final   • Ketones, UA 03/23/2024 Negative  Negative mg/dl Final   • Urobilinogen, UA 03/23/2024 0.2  0.2, 1.0 E.U./dl E.U./dl Final   • Bilirubin, UA 03/23/2024 Negative  Negative Final   • Occult Blood, UA 03/23/2024 Negative  Negative Final         Tia Warren MD        \"This note has been constructed using a voice recognition system.Therefore there may be syntax, spelling, and/or grammatical errors. Please call if you have any questions. \"  "

## 2024-05-09 ENCOUNTER — OFFICE VISIT (OUTPATIENT)
Dept: FAMILY MEDICINE CLINIC | Facility: CLINIC | Age: 79
End: 2024-05-09
Payer: MEDICARE

## 2024-05-09 VITALS
BODY MASS INDEX: 33.31 KG/M2 | SYSTOLIC BLOOD PRESSURE: 124 MMHG | WEIGHT: 181 LBS | HEART RATE: 68 BPM | OXYGEN SATURATION: 97 % | DIASTOLIC BLOOD PRESSURE: 72 MMHG | HEIGHT: 62 IN

## 2024-05-09 DIAGNOSIS — K64.8 OTHER HEMORRHOIDS: ICD-10-CM

## 2024-05-09 DIAGNOSIS — R35.0 URINARY FREQUENCY: ICD-10-CM

## 2024-05-09 DIAGNOSIS — F32.A ANXIETY AND DEPRESSION: ICD-10-CM

## 2024-05-09 DIAGNOSIS — R26.81 UNSTEADY GAIT: ICD-10-CM

## 2024-05-09 DIAGNOSIS — R55 NEAR SYNCOPE: ICD-10-CM

## 2024-05-09 DIAGNOSIS — F41.9 ANXIETY AND DEPRESSION: ICD-10-CM

## 2024-05-09 DIAGNOSIS — N81.4 UTEROVAGINAL PROLAPSE: ICD-10-CM

## 2024-05-09 DIAGNOSIS — I10 PRIMARY HYPERTENSION: ICD-10-CM

## 2024-05-09 DIAGNOSIS — E66.09 CLASS 1 OBESITY DUE TO EXCESS CALORIES WITH SERIOUS COMORBIDITY AND BODY MASS INDEX (BMI) OF 33.0 TO 33.9 IN ADULT: ICD-10-CM

## 2024-05-09 DIAGNOSIS — R73.03 PREDIABETES: ICD-10-CM

## 2024-05-09 DIAGNOSIS — E78.5 DYSLIPIDEMIA: Primary | ICD-10-CM

## 2024-05-09 PROCEDURE — G2211 COMPLEX E/M VISIT ADD ON: HCPCS | Performed by: INTERNAL MEDICINE

## 2024-05-09 PROCEDURE — 99214 OFFICE O/P EST MOD 30 MIN: CPT | Performed by: INTERNAL MEDICINE

## 2024-05-09 RX ORDER — HYDROCORTISONE 25 MG/G
CREAM TOPICAL 2 TIMES DAILY
Qty: 28 G | Refills: 1 | Status: SHIPPED | OUTPATIENT
Start: 2024-05-09

## 2024-05-09 RX ORDER — OXYBUTYNIN CHLORIDE 5 MG/1
5 TABLET ORAL 3 TIMES DAILY
Qty: 60 TABLET | Refills: 6 | Status: SHIPPED | OUTPATIENT
Start: 2024-05-09

## 2024-05-09 NOTE — ASSESSMENT & PLAN NOTE
MRI of the lumbar spine revealing arthritic changes still has some unsteadiness with her gait balance center evaluation and treatment did not help her much according to the patient.

## 2024-05-09 NOTE — ASSESSMENT & PLAN NOTE
Last cholesterol level was 203 we will get a repeat lab work done meanwhile continue with lifestyle modification lose weight

## 2024-05-09 NOTE — ASSESSMENT & PLAN NOTE
Patient currently taking oxybutynin 5 mg twice daily he is going to have an appointment with the urologist also for further evaluation and treatment with history of uterovaginal prolapse causing urgency with urination was seen in the past by the urogynecologist

## 2024-05-09 NOTE — ASSESSMENT & PLAN NOTE
No more episodes of near syncope feeling we cut back on the blood pressure medication and stop it completely since it was running on the lower side patient is also taking B12 which also appears to be helping.

## 2024-05-09 NOTE — ASSESSMENT & PLAN NOTE
Emphasized regarding diet exercise lifestyle modification cutting back on calorie intake and lose weight

## 2024-05-11 NOTE — ASSESSMENT & PLAN NOTE
Meloxicam as needed Unable to assess patient lethargic/Automatic Implantable Cardioverter Defibrillator/Pacemaker

## 2024-05-29 ENCOUNTER — HOSPITAL ENCOUNTER (OUTPATIENT)
Dept: RADIOLOGY | Facility: HOSPITAL | Age: 79
Discharge: HOME | End: 2024-05-29
Attending: INTERNAL MEDICINE
Payer: MEDICARE

## 2024-05-29 VITALS — WEIGHT: 183 LBS | BODY MASS INDEX: 33.68 KG/M2 | HEIGHT: 62 IN

## 2024-05-29 DIAGNOSIS — Z12.31 ENCOUNTER FOR SCREENING MAMMOGRAM FOR MALIGNANT NEOPLASM OF BREAST: ICD-10-CM

## 2024-05-29 PROCEDURE — 77067 SCR MAMMO BI INCL CAD: CPT

## 2024-05-29 PROCEDURE — 77063 BREAST TOMOSYNTHESIS BI: CPT

## 2024-05-30 ENCOUNTER — OFFICE VISIT (OUTPATIENT)
Age: 79
End: 2024-05-30
Payer: MEDICARE

## 2024-05-30 VITALS
HEART RATE: 76 BPM | HEIGHT: 62 IN | DIASTOLIC BLOOD PRESSURE: 68 MMHG | SYSTOLIC BLOOD PRESSURE: 111 MMHG | BODY MASS INDEX: 33.68 KG/M2 | WEIGHT: 183 LBS | RESPIRATION RATE: 16 BRPM

## 2024-05-30 DIAGNOSIS — I70.209 PERIPHERAL ARTERIOSCLEROSIS (HCC): Primary | ICD-10-CM

## 2024-05-30 DIAGNOSIS — B35.1 ONYCHOMYCOSIS: ICD-10-CM

## 2024-05-30 DIAGNOSIS — M79.672 PAIN IN BOTH FEET: ICD-10-CM

## 2024-05-30 DIAGNOSIS — M79.671 PAIN IN BOTH FEET: ICD-10-CM

## 2024-05-30 PROCEDURE — 11721 DEBRIDE NAIL 6 OR MORE: CPT | Performed by: PODIATRIST

## 2024-05-30 NOTE — PROGRESS NOTES
Assessment/Plan:  Pain.  Mycosis of nail.  Paronychia.  Peripheral artery disease.     Plan.  Chart reviewed.  Foot exam performed.  Patient educated on condition.  All mycotic nails debrided without pain or complication.  Nails debrided mechanically with nail nipper.  Aftercare instruction given.  Return for follow-up     Subjective:   Patient complains of pain in her feet with ambulation.  No history of trauma.             Past Medical History:   Diagnosis Date    Hypertension      Macular degeneration                     Past Surgical History:   Procedure Laterality Date    INTRAOCULAR LENS INSERTION                       Allergies   Allergen Reactions    Atorvastatin Other (See Comments)       legs get stiff    Zithromax [Azithromycin]      Erythromycin Base Palpitations            Current Outpatient Prescriptions:     Calcium Carb-Cholecalciferol (CALCIUM 1000 + D) 1000-800 MG-UNIT TABS, Calcium TABS  Refills: 0  Active, Disp: , Rfl:     cholecalciferol (VITAMIN D3) 1,000 units tablet, Vitamin D TABS  Refills: 0  Active, Disp: , Rfl:     Cinnamon 500 MG capsule, Cinnamon CAPS  Refills: 0  Active, Disp: , Rfl:     cycloSPORINE (RESTASIS) 0.05 % ophthalmic emulsion, Restasis 0.05 % Ophthalmic Emulsion  Refills: 0  Active, Disp: , Rfl:     Flaxseed, Linseed, (FLAX SEED OIL) 1000 MG CAPS, Flax Seed Oil 1000 MG Oral Capsule  Refills: 0  Active, Disp: , Rfl:     fluticasone (FLONASE) 50 mcg/act nasal spray, Fluticasone Propionate 50 MCG/ACT Nasal Suspension  Quantity: 16;  Refills: 0   Started 29-Nov-2014 Active, Disp: , Rfl:     Magnesium 100 MG CAPS, Magnesium TABS  Refills: 0  Active, Disp: , Rfl:     Omega-3 Fatty Acids (FISH OIL) 645 MG CAPS, Fish Oil CAPS  Refills: 0  Active, Disp: , Rfl:     Potassium 95 MG TABS, Potassium TABS  Refills: 0  Active, Disp: , Rfl:     telmisartan (MICARDIS) 80 MG tablet, Micardis 80 MG Oral Tablet  Refills: 0  Active, Disp: , Rfl:      There is no problem list on file for this  patient.            Patient ID: Neisha Salazar is a 79 y.o. female.     HPI     The following portions of the patient's history were reviewed and updated as appropriate: allergies, current medications, past family history, past medical history, past social history, past surgical history and problem list.        Objective:  Patient's shoes and socks removed.   Foot ExamPhysical Exam             Physical Exam  Left Foot: Appearance: Normal except as noted: excessive supination\R\R\b0pes cavus. Tenderness: None except the great toe,\R\w5lohnpt longitudinal arch\R\R\e4axqktyauu of the plantar fascia.   Right Foot: Appearance: Normal except as noted: excessive supination\R\R\b0pes cavus. Tenderness: None except the medial longitudinal arch\R\R\y5dtztfozaq of the plantar fascia.   Left Ankle: ROM: limited ROM in all planes   Right Ankle: ROM: limited ROM in all planes   Neurological Exam: performed. Light touch was intact bilaterally. Vibratory sensation was intact bilaterally. Response to monofilament test was intact bilaterally. Deep tendon reflexes: patellar reflex present bilaterally\R\R\n8ozyovnwf reflex present bilaterally.   Vascular Exam: performed Dorsalis pedis pulses were diminished bilaterally. Posterior tibial pulses were diminished bilaterally. Elevation Pallor: present bilaterally. Capillary refill time was greater than 3 seconds bilaterally\R\R\z7Umrld 8 findings bilateral negative digital hair noted all mycotic nails debrided today. Edema: mild bilaterally.  These are Q, 9 findings bilateral  Toenails: All of the toenails were elongated,\R\o4fkvqhjuoywzfi,\R\n8kkfblivfmi,\R\w1vktbryy,\R\z9ghsnfr\R\R\f3Tcrnzxm.   Hyperkeratosis: present on both first sub metatarsals.   Shoe Gear Evaluation: Recommendation(s): custom inlays\R\R\b0SAS style.

## 2024-06-25 ENCOUNTER — OFFICE VISIT (OUTPATIENT)
Dept: UROLOGY | Facility: CLINIC | Age: 79
End: 2024-06-25
Payer: MEDICARE

## 2024-06-25 VITALS
DIASTOLIC BLOOD PRESSURE: 80 MMHG | BODY MASS INDEX: 33.13 KG/M2 | WEIGHT: 180 LBS | SYSTOLIC BLOOD PRESSURE: 120 MMHG | HEIGHT: 62 IN | HEART RATE: 83 BPM | OXYGEN SATURATION: 98 %

## 2024-06-25 DIAGNOSIS — R35.1 NOCTURIA: ICD-10-CM

## 2024-06-25 DIAGNOSIS — R35.0 URINARY FREQUENCY: Primary | ICD-10-CM

## 2024-06-25 LAB
POST-VOID RESIDUAL VOLUME, ML POC: 79 ML
SL AMB  POCT GLUCOSE, UA: NORMAL
SL AMB LEUKOCYTE ESTERASE,UA: NORMAL
SL AMB POCT BILIRUBIN,UA: NORMAL
SL AMB POCT BLOOD,UA: NORMAL
SL AMB POCT CLARITY,UA: CLEAR
SL AMB POCT COLOR,UA: YELLOW
SL AMB POCT KETONES,UA: NORMAL
SL AMB POCT NITRITE,UA: NORMAL
SL AMB POCT PH,UA: 7.5
SL AMB POCT SPECIFIC GRAVITY,UA: 1010
SL AMB POCT URINE PROTEIN: NORMAL
SL AMB POCT UROBILINOGEN: 0.2

## 2024-06-25 PROCEDURE — 81002 URINALYSIS NONAUTO W/O SCOPE: CPT | Performed by: UROLOGY

## 2024-06-25 PROCEDURE — 99203 OFFICE O/P NEW LOW 30 MIN: CPT | Performed by: UROLOGY

## 2024-06-25 PROCEDURE — 51798 US URINE CAPACITY MEASURE: CPT | Performed by: UROLOGY

## 2024-06-25 NOTE — PATIENT INSTRUCTIONS
Voiding diary where you measure the time and amount urinated after going to bed.  Also record the last urination after getting out of bed in the morning.  On the voiding diary you should keep track of when you actually got into bed.  If you notice a pattern of large volumes of urine several times at night, take it upon yourself to do leg elevation as we described which is toes over nose for probably between an hour and 1/2 to 2 hours each late afternoon to try to get rid of any excess fluid in the legs.  Then redo the diary and see if there was any improvement.  It might take several tries to see if this is the case.

## 2024-06-25 NOTE — PROGRESS NOTES
Neisha Salazar is a(n) 79 y.o. female. , :  1945    Subjective     Assessment:  The encounter diagnosis was Urinary frequency.  79-year-old very pleasant and intelligent woman referred from Dr. Warren for nocturia.  In September last year she underwent an anterior and posterior repair by Dr. Bolden at [facility].  She was prescribed some Myrbetriq but was unable to afford it.  She was started on oxybutynin twice daily by her primary care.  She did have previous daytime frequency but that was improved with the oxybutynin at 5 mg twice daily.  She is a little concerned that she might get dehydrated if she increases to 3 times a day.  The drug has the potential to cause constipation and dry mouth.  She has never done a voiding diary to see about how much fluid she mobilizes in the afternoon and evening.  She does not admit to significant lower extremity swelling.  She has had her blood pressure medication stopped.  Blood pressure is always good.  Does not feel as if the operation made her urinary frequency more prevalent.  She has had this issue for probably 10 years.    Plan: We will do an nighttime voiding diary and then see her back.  She was explained that if she is seeing large volumes voided at night that she should probably do leg elevation in the late afternoon or early evening to see if she can mobilize her fluids before bed.  This might allow her to sleep through the night.  She should continue the oxybutynin at least once or twice each day as a background just so we do not confuse the issues.     History  Anterior posterior repair 2023 Dr. Bolden [facility]  Nocturia not improved with oxybutynin 5 mg twice daily.    Prior Visits  2023 [facility]  UDS:     Initial uroflow: 0cc Catheterized: 120cc  Pessary: #2 ring  UDS  Instilled: 248cc (removed 90cc secondary to DO and bothersome urgency) Voided: 100cc  Positive detrusor overactivity + leakage  Detrusor pressure radha to a high of 34cm/H20 during  micturition.   One episode of Valsalva leakage but noted that patient was having DO contraction right before valsalva maneuver.   Minimal increased EMG activity during micturition.   able to void with catheter in place.     11/09/2023 Chery Bolden [facility] Urogyn  Procedure: ([facility])  1. Anterior wall native tissue repair  2. Posterior wall native tissue repair  3. Perineorrhaphy  4. Cystourethroscopy  Surgery Date: 9/12/23     Urge incontinence   C/o nocturia. Interested in medications.   Rx for Myrbetriq 25 mg daily  F/u w/ PA in 6 weeks      Surgical aftercare, genitourinary system   Well healed incision. Pt very happy with outcome.     5/9/2024 Ann  Urinary frequency  Assessment & Plan:  Patient currently taking oxybutynin 5 mg twice daily he is going to have an appointment with the urologist also for further evaluation and treatment with history of uterovaginal prolapse causing urgency with urination was seen in the past by the urogynecologist    6/25/2024 BALDEMAR Cody  79-year-old very pleasant and intelligent woman referred from Dr. Douglas for nocturia.  She did have previous daytime frequency but that was improved with the oxybutynin at 5 mg twice daily.  She is a little concerned that she might get dehydrated if she increases to 3 times a day.  The drug has the potential to cause constipation and dry mouth.  She has never done a voiding diary to see about how much fluid she mobilizes in the afternoon and evening.  She does not admit to significant lower extremity swelling.  She has had her blood pressure medication stopped.  Blood pressure is always good.  Does not feel as if the operation made her urinary frequency more prevalent.  She has had this issue for probably 10 years.    Plan: We will do an nighttime voiding diary and then see her back.  She was explained that if she is seeing large volumes voided at night that she should probably do leg elevation in the late afternoon or early evening to see if she can  "mobilize her fluids before bed.  This might allow her to sleep through the night.  She should continue the oxybutynin at least once or twice each day as a background just so we do not confuse the issues.    Review of Systems    No results found for: \"PSA\"  No results found for: \"TESTOSTERONE\"  No components found for: \"CR\"  No results found for: \"HBA1C\"    Objective     /80 (BP Location: Left arm, Patient Position: Sitting, Cuff Size: Standard)   Pulse 83   Ht 5' 2\" (1.575 m)   Wt 81.6 kg (180 lb)   SpO2 98%   BMI 32.92 kg/m²     Physical Exam      [facility]  "

## 2024-07-09 NOTE — PROGRESS NOTES
Office Visit Note  07/10/24     Neisha Salazar 79 y.o. female MRN: [id number]  : 1945    Assessment:     1. Urinary frequency  Assessment & Plan:  Urology note appreciated as mentioned in the history of present illness patient passing more frequently in the nighttime patient not interested in keeping a diary regarding her voiding in the night along with the residuals how much she is passing urine.  Currently on oxybutynin 5 mg in the morning appears to be helping during the daytime but not in the nighttime when she takes the 5 mg we will change it to a CPAP 5 mg and see if it makes any difference we will hold off the oxybutynin for now  2. Anxiety and depression  Assessment & Plan:  Patient occasionally takes Xanax for anxiety as needed will renew the same  Orders:  -     ALPRAZolam (XANAX) 0.25 mg tablet; Take 1 tablet (0.25 mg total) by mouth 2 (two) times a day as needed for anxiety  3. Class 1 obesity due to excess calories with serious comorbidity and body mass index (BMI) of 33.0 to 33.9 in adult  Assessment & Plan:  Patient BMI 32.81 emphasized regarding diet exercise low calorie diet lose weight  4. Dyslipidemia  Assessment & Plan:  Recommend again low-fat diet low-cholesterol diet exercise we will follow-up with repeat lab at a later date if necessary start on medication  5. Muscle cramps  6. Near syncope  Assessment & Plan:  No more episodes of syncope after we had stopped the blood pressure medication today's blood pressure is 114/68.  7. Nocturia  8. Prediabetes  Assessment & Plan:  Last hemoglobin A1c was 5.8 we will follow-up with repeat 1 at a later date emphasized regarding diet  9. Primary hypertension  Assessment & Plan:  Blood pressure today 114/68 without any medication stable  10. Primary osteoarthritis involving multiple joints  11. Unsteady gait  12. Urinary incontinence, unspecified type  -     solifenacin (VESICARE) 5 mg tablet; Take 1 tablet (5 mg total) by mouth daily  13.  Uterovaginal prolapse             Discussion Summary and Plan:  Today's care plan and medications were reviewed with patient in detail and all their questions answered to their satisfaction.    Chief Complaint   Patient presents with    Follow-up      Subjective:  Patient is coming here for the follow-up evaluation with regards to the symptoms of urinary incontinence especially in the nighttime she has to go to the bathroom frequently during the daytime she has been taking oxybutynin appears to be helping .But even though  she takes the pill in the nighttime oxybutynin 5 mg is not helping much.  She was seen by the urologist few days back who recommended to keep a diary and also try to go keep the legs up during the evening time as much as possible and mobilize the fluids and pass urine and may not have to go that frequently in the nighttime.  However patient is reluctant to go through these exercises at this time.  Medications reviewed labs reviewed.    Will try the patient on Vesicare 5 mg at bedtime and see if it makes any difference we will hold off the oxybutynin for now        The following portions of the patient's history were reviewed and updated as appropriate: allergies, current medications, past family history, past medical history, past social history, past surgical history and problem list.    Review of Systems   Constitutional:  Negative for chills and fever.   HENT:  Negative for ear pain and sore throat.    Eyes:  Negative for pain and visual disturbance.   Respiratory:  Negative for cough and shortness of breath.    Cardiovascular:  Negative for chest pain and palpitations.   Gastrointestinal:  Negative for abdominal pain and vomiting.   Genitourinary:  Negative for dysuria and hematuria.   Musculoskeletal:  Negative for arthralgias and back pain.   Skin:  Negative for color change and rash.   Neurological:  Negative for seizures and syncope.   All other systems reviewed and are negative.         Historical Information   Patient Active Problem List   Diagnosis    Primary hypertension    Class 1 obesity due to excess calories in adult    Dyslipidemia    Primary osteoarthritis involving multiple joints    Urinary frequency    Muscle cramps    Unsteady gait    Anxiety and depression    Other hemorrhoids    Uterovaginal prolapse    Urinary incontinence    Near syncope    Prediabetes    Nocturia     Past Medical History:   Diagnosis Date    Anxiety disorder     Arthritis     Bright red rectal bleeding     Colon polyp     DJD (degenerative joint disease)     Hernia A year ago    High cholesterol     Hyperactivity of bladder     Hypertension     Irritable bowel syndrome Last July    Macular degeneration     Obesity     Osteoporosis     Plantar fasciitis     Not sure of date     Past Surgical History:   Procedure Laterality Date    CARDIAC SURGERY      CHOLECYSTECTOMY      COLONOSCOPY      DXA PROCEDURE (HISTORICAL)  06/23/2021    INTRAOCULAR LENS INSERTION      MAMMO (HISTORICAL)  01/04/2022    TONSILLECTOMY      TOTAL SHOULDER REPLACEMENT Left      Social History     Substance and Sexual Activity   Alcohol Use No     Social History     Substance and Sexual Activity   Drug Use Never     Social History     Tobacco Use   Smoking Status Former    Current packs/day: 0.00    Types: Cigarettes    Passive exposure: Past   Smokeless Tobacco Never   Tobacco Comments    quit cigarettes age 27     Family History   Problem Relation Age of Onset    Hearing loss Mother     Esophageal cancer Father     Hypertension Sister     No Known Problems Sister     Esophageal cancer Maternal Grandmother     Diabetes Paternal Grandmother     Breast cancer Maternal Aunt 70    No Known Problems Maternal Aunt     No Known Problems Maternal Aunt     No Known Problems Maternal Aunt     Skin cancer Paternal Aunt     Melanoma Paternal Aunt         unknown    No Known Problems Paternal Aunt     No Known Problems Paternal Aunt     Cancer Paternal  Aunt     Breast cancer Cousin 77     Health Maintenance Due   Topic    Hepatitis C Screening     RSV Vaccine Age 60+ Years (1 - 1-dose 60+ series)    Pneumococcal Vaccine: 65+ Years (1 of 1 - PCV)    COVID-19 Vaccine (3 - 2023-24 season)    PT PLAN OF CARE     Influenza Vaccine (1)    Depression Screening     Medicare Annual Wellness Visit (AWV)       Meds/Allergies       Current Outpatient Medications:     ALPRAZolam (XANAX) 0.25 mg tablet, Take 1 tablet (0.25 mg total) by mouth 2 (two) times a day as needed for anxiety, Disp: 40 tablet, Rfl: 0    aspirin (ECOTRIN LOW STRENGTH) 81 mg EC tablet, Take 81 mg by mouth daily, Disp: , Rfl:     Biotin 1000 MCG tablet, Take 1,000 mcg by mouth daily, Disp: , Rfl:     Calcium Acetate, Phos Binder, (CALCIUM ACETATE PO), Take 2 tablets by mouth daily CALCIUM CARBONATE/VITAMIN D3 (CALCIUM 600 WITH VITAMIN D3 ORAL), Disp: , Rfl:     Cranberry 200 MG CAPS, Take by mouth in the morning, Disp: , Rfl:     docusate sodium (COLACE) 50 mg capsule, Take by mouth, Disp: , Rfl:     Flaxseed, Linseed, (FLAX SEED OIL) 1000 MG CAPS, Flax Seed Oil 1000 MG Oral Capsule  Refills: 0  Active, Disp: , Rfl:     hydrocortisone (ANUSOL-HC) 2.5 % rectal cream, Apply topically 2 (two) times a day, Disp: 28 g, Rfl: 1    Multiple Vitamins-Minerals (PRESERVISION AREDS 2 PO), Take by mouth 2 (two) times a day, Disp: , Rfl:     NATURAL PSYLLIUM SEED PO, Take 1 tablet by mouth every evening, Disp: , Rfl:     Omega-3 Fatty Acids (FISH OIL) 645 MG CAPS, Fish Oil CAPS  Refills: 0  Active, Disp: , Rfl:     Potassium 95 MG TABS, Potassium TABS  Refills: 0  Active, Disp: , Rfl:     solifenacin (VESICARE) 5 mg tablet, Take 1 tablet (5 mg total) by mouth daily, Disp: 30 tablet, Rfl: 1    TURMERIC PO, Take 1 tablet by mouth every evening, Disp: , Rfl:     vitamin B-12 (VITAMIN B-12) 1,000 mcg tablet, Take 1 tablet (1,000 mcg total) by mouth daily, Disp: 30 tablet, Rfl: 2      Objective:    Vitals:   /68 (BP  "Location: Right arm, Patient Position: Sitting, Cuff Size: Standard)   Pulse 87   Ht 5' 2\" (1.575 m)   Wt 81.4 kg (179 lb 6.4 oz)   SpO2 96%   BMI 32.81 kg/m²   Body mass index is 32.81 kg/m².  Vitals:    07/10/24 1402   Weight: 81.4 kg (179 lb 6.4 oz)       Physical Exam  Vitals and nursing note reviewed.   Constitutional:       Appearance: Normal appearance.   Cardiovascular:      Rate and Rhythm: Normal rate and regular rhythm.      Heart sounds: Normal heart sounds.   Pulmonary:      Effort: Pulmonary effort is normal.      Breath sounds: Normal breath sounds.   Abdominal:      Palpations: Abdomen is soft.   Musculoskeletal:      Cervical back: Normal range of motion and neck supple.      Right lower leg: No edema.      Left lower leg: No edema.   Skin:     General: Skin is warm and dry.   Neurological:      Mental Status: She is alert and oriented to person, place, and time.   Psychiatric:         Mood and Affect: Mood normal.         Behavior: Behavior normal.         Lab Review   Office Visit on 06/25/2024   Component Date Value Ref Range Status    POST-VOID RESIDUAL VOLUME, ML POC 06/25/2024 79  mL Final    LEUKOCYTE ESTERASE,UA 06/25/2024 ++   Final    NITRITE,UA 06/25/2024 -   Final    SL AMB POCT UROBILINOGEN 06/25/2024 0.2   Final    POCT URINE PROTEIN 06/25/2024 trace   Final     PH,UA 06/25/2024 7.5   Final    BLOOD,UA 06/25/2024 -   Final    SPECIFIC GRAVITY,UA 06/25/2024 1,010   Final    KETONES,UA 06/25/2024 -   Final    BILIRUBIN,UA 06/25/2024 -   Final    GLUCOSE, UA 06/25/2024 -   Final     COLOR,UA 06/25/2024 yellow   Final    CLARITY,UA 06/25/2024 clear   Final         Tia Warren MD        \"This note has been constructed using a voice recognition system.Therefore there may be syntax, spelling, and/or grammatical errors. Please call if you have any questions. \"  "

## 2024-07-10 ENCOUNTER — OFFICE VISIT (OUTPATIENT)
Dept: FAMILY MEDICINE CLINIC | Facility: CLINIC | Age: 79
End: 2024-07-10
Payer: MEDICARE

## 2024-07-10 VITALS
WEIGHT: 179.4 LBS | BODY MASS INDEX: 33.01 KG/M2 | HEIGHT: 62 IN | HEART RATE: 87 BPM | OXYGEN SATURATION: 96 % | DIASTOLIC BLOOD PRESSURE: 68 MMHG | SYSTOLIC BLOOD PRESSURE: 114 MMHG

## 2024-07-10 DIAGNOSIS — R32 URINARY INCONTINENCE, UNSPECIFIED TYPE: ICD-10-CM

## 2024-07-10 DIAGNOSIS — R73.03 PREDIABETES: ICD-10-CM

## 2024-07-10 DIAGNOSIS — R35.0 URINARY FREQUENCY: Primary | ICD-10-CM

## 2024-07-10 DIAGNOSIS — M15.9 PRIMARY OSTEOARTHRITIS INVOLVING MULTIPLE JOINTS: ICD-10-CM

## 2024-07-10 DIAGNOSIS — N81.4 UTEROVAGINAL PROLAPSE: ICD-10-CM

## 2024-07-10 DIAGNOSIS — R25.2 MUSCLE CRAMPS: ICD-10-CM

## 2024-07-10 DIAGNOSIS — F32.A ANXIETY AND DEPRESSION: ICD-10-CM

## 2024-07-10 DIAGNOSIS — E78.5 DYSLIPIDEMIA: ICD-10-CM

## 2024-07-10 DIAGNOSIS — R26.81 UNSTEADY GAIT: ICD-10-CM

## 2024-07-10 DIAGNOSIS — E66.09 CLASS 1 OBESITY DUE TO EXCESS CALORIES WITH SERIOUS COMORBIDITY AND BODY MASS INDEX (BMI) OF 33.0 TO 33.9 IN ADULT: ICD-10-CM

## 2024-07-10 DIAGNOSIS — I10 PRIMARY HYPERTENSION: ICD-10-CM

## 2024-07-10 DIAGNOSIS — R35.1 NOCTURIA: ICD-10-CM

## 2024-07-10 DIAGNOSIS — R55 NEAR SYNCOPE: ICD-10-CM

## 2024-07-10 DIAGNOSIS — F41.9 ANXIETY AND DEPRESSION: ICD-10-CM

## 2024-07-10 PROCEDURE — 99214 OFFICE O/P EST MOD 30 MIN: CPT | Performed by: INTERNAL MEDICINE

## 2024-07-10 PROCEDURE — G2211 COMPLEX E/M VISIT ADD ON: HCPCS | Performed by: INTERNAL MEDICINE

## 2024-07-10 RX ORDER — SOLIFENACIN SUCCINATE 5 MG/1
5 TABLET, FILM COATED ORAL DAILY
Qty: 30 TABLET | Refills: 1 | Status: SHIPPED | OUTPATIENT
Start: 2024-07-10

## 2024-07-10 RX ORDER — ALPRAZOLAM 0.25 MG/1
0.25 TABLET ORAL 2 TIMES DAILY PRN
Qty: 40 TABLET | Refills: 0 | Status: SHIPPED | OUTPATIENT
Start: 2024-07-10

## 2024-07-11 ENCOUNTER — TELEPHONE (OUTPATIENT)
Age: 79
End: 2024-07-11

## 2024-07-11 NOTE — ASSESSMENT & PLAN NOTE
Recommend again low-fat diet low-cholesterol diet exercise we will follow-up with repeat lab at a later date if necessary start on medication

## 2024-07-11 NOTE — ASSESSMENT & PLAN NOTE
Urology note appreciated as mentioned in the history of present illness patient passing more frequently in the nighttime patient not interested in keeping a diary regarding her voiding in the night along with the residuals how much she is passing urine.  Currently on oxybutynin 5 mg in the morning appears to be helping during the daytime but not in the nighttime when she takes the 5 mg we will change it to a CPAP 5 mg and see if it makes any difference we will hold off the oxybutynin for now

## 2024-07-11 NOTE — ASSESSMENT & PLAN NOTE
Last hemoglobin A1c was 5.8 we will follow-up with repeat 1 at a later date emphasized regarding diet

## 2024-07-11 NOTE — ASSESSMENT & PLAN NOTE
No more episodes of syncope after we had stopped the blood pressure medication today's blood pressure is 114/68.

## 2024-07-11 NOTE — TELEPHONE ENCOUNTER
Patient calling in looking for lost license. Per office there was no license found. Patient is understanding.

## 2024-09-08 ENCOUNTER — RA CDI HCC (OUTPATIENT)
Dept: OTHER | Facility: HOSPITAL | Age: 79
End: 2024-09-08

## 2024-09-13 NOTE — PROGRESS NOTES
Office Visit Note  24     Neisha Salazar 79 y.o. female MRN: [id number]  : 1945    Assessment:     1. Urinary frequency  Assessment & Plan:  As mentioned above patient with with urinary frequency currently taking oxybutynin.  Last visit I prescribed her Vesicare but she has not started it yet.  She is going to see the ophthalmologist soon as she was concerned about the side effects.  2. Urinary incontinence, unspecified type  3. Class 1 obesity due to excess calories with serious comorbidity and body mass index (BMI) of 33.0 to 33.9 in adult  Assessment & Plan:  BMI is 32.39 again discussed with patient regarding weight reduction lifestyle modification cutting back on calories  4. Primary hypertension  Assessment & Plan:  Blood pressure is stable 116/68 without any medication  5. Anxiety and depression  Assessment & Plan:  Patient takes Xanax on a as needed basis we will continue the same  6. Dyslipidemia  Assessment & Plan:  Diet controlled we will follow-up with repeat lab  7. Other hemorrhoids  8. Prediabetes  Assessment & Plan:  Follow-up with repeat hemoglobin A1c    9. Primary osteoarthritis involving multiple joints  Assessment & Plan:  Tylenol as needed  10. Near syncope  Assessment & Plan:  No episodes of near syncope  11. Nocturia  Assessment & Plan:  Increased frequency with urination with nighttime recommended Vesicare but she will is going to check with the ophthalmologist before starting to take that medication.  Currently on oxybutynin which helps to some extent             Discussion Summary and Plan:  Today's care plan and medications were reviewed with patient in detail and all their questions answered to their satisfaction.    Chief Complaint   Patient presents with    Follow-up      Subjective:  Patient is coming here for a follow-up evaluation with regards to her symptoms of overactive bladder with increased frequency of urination especially during the nighttime.  I have ordered  Vesicare for this problem but however she has not taken it yet as she was concerned about the side effects.  Currently she is taking oxybutynin to some extent that is helping.    Patient was seen by the urologist wanted her to keep a diary of her voiding timings and the volume but patient is not interested in that.    Medications reviewed currently not on any prescription medication she was on antihypertensive medication but her blood pressure started to drop too low and we stopped it.  We will order the lab work prior to the next visit        The following portions of the patient's history were reviewed and updated as appropriate: allergies, current medications, past family history, past medical history, past social history, past surgical history and problem list.    Review of Systems   Constitutional:  Negative for chills and fever.   HENT:  Negative for ear pain and sore throat.    Eyes:  Negative for pain and visual disturbance.   Respiratory:  Negative for cough and shortness of breath.    Cardiovascular:  Negative for chest pain and palpitations.   Gastrointestinal:  Negative for abdominal pain and vomiting.   Genitourinary:  Positive for frequency. Negative for dysuria and hematuria.   Musculoskeletal:  Positive for arthralgias. Negative for back pain.   Skin:  Negative for color change and rash.   Neurological:  Negative for seizures and syncope.   All other systems reviewed and are negative.        Historical Information   Patient Active Problem List   Diagnosis    Primary hypertension    Class 1 obesity due to excess calories in adult    Dyslipidemia    Primary osteoarthritis involving multiple joints    Urinary frequency    Muscle cramps    Unsteady gait    Anxiety and depression    Other hemorrhoids    Uterovaginal prolapse    Urinary incontinence    Near syncope    Prediabetes    Nocturia     Past Medical History:   Diagnosis Date    Anxiety disorder     Arthritis     Bright red rectal bleeding      Colon polyp     DJD (degenerative joint disease)     Hernia A year ago    High cholesterol     Hyperactivity of bladder     Hypertension     Irritable bowel syndrome Last July    Macular degeneration     Obesity     Osteoporosis     Plantar fasciitis     Not sure of date     Past Surgical History:   Procedure Laterality Date    CARDIAC SURGERY      CHOLECYSTECTOMY      COLONOSCOPY      DXA PROCEDURE (HISTORICAL)  06/23/2021    INTRAOCULAR LENS INSERTION      MAMMO (HISTORICAL)  01/04/2022    TONSILLECTOMY      TOTAL SHOULDER REPLACEMENT Left      Social History     Substance and Sexual Activity   Alcohol Use No     Social History     Substance and Sexual Activity   Drug Use Never     Social History     Tobacco Use   Smoking Status Former    Current packs/day: 0.00    Types: Cigarettes    Passive exposure: Past   Smokeless Tobacco Never   Tobacco Comments    quit cigarettes age 27     Family History   Problem Relation Age of Onset    Hearing loss Mother     Esophageal cancer Father     Hypertension Sister     No Known Problems Sister     Esophageal cancer Maternal Grandmother     Diabetes Paternal Grandmother     Breast cancer Maternal Aunt 70    No Known Problems Maternal Aunt     No Known Problems Maternal Aunt     No Known Problems Maternal Aunt     Skin cancer Paternal Aunt     Melanoma Paternal Aunt         unknown    No Known Problems Paternal Aunt     No Known Problems Paternal Aunt     Cancer Paternal Aunt     Breast cancer Cousin 77     Health Maintenance Due   Topic    Hepatitis C Screening     RSV Vaccine Age 60+ Years (1 - 1-dose 60+ series)    Pneumococcal Vaccine: 65+ Years (1 of 1 - PCV)    PT PLAN OF CARE     COVID-19 Vaccine (3 - 2023-24 season)    Influenza Vaccine (1)    Depression Screening     Medicare Annual Wellness Visit (AWV)     Fall Risk       Meds/Allergies       Current Outpatient Medications:     ALPRAZolam (XANAX) 0.25 mg tablet, Take 1 tablet (0.25 mg total) by mouth 2 (two) times a  "day as needed for anxiety, Disp: 40 tablet, Rfl: 0    aspirin (ECOTRIN LOW STRENGTH) 81 mg EC tablet, Take 81 mg by mouth daily, Disp: , Rfl:     Biotin 1000 MCG tablet, Take 1,000 mcg by mouth daily, Disp: , Rfl:     Calcium Acetate, Phos Binder, (CALCIUM ACETATE PO), Take 2 tablets by mouth daily CALCIUM CARBONATE/VITAMIN D3 (CALCIUM 600 WITH VITAMIN D3 ORAL), Disp: , Rfl:     Cranberry 200 MG CAPS, Take by mouth in the morning, Disp: , Rfl:     docusate sodium (COLACE) 50 mg capsule, Take by mouth, Disp: , Rfl:     Flaxseed, Linseed, (FLAX SEED OIL) 1000 MG CAPS, Flax Seed Oil 1000 MG Oral Capsule  Refills: 0  Active, Disp: , Rfl:     hydrocortisone (ANUSOL-HC) 2.5 % rectal cream, Apply topically 2 (two) times a day, Disp: 28 g, Rfl: 1    Multiple Vitamins-Minerals (PRESERVISION AREDS 2 PO), Take by mouth 2 (two) times a day, Disp: , Rfl:     NATURAL PSYLLIUM SEED PO, Take 1 tablet by mouth every evening, Disp: , Rfl:     Omega-3 Fatty Acids (FISH OIL) 645 MG CAPS, Fish Oil CAPS  Refills: 0  Active, Disp: , Rfl:     Potassium 95 MG TABS, Potassium TABS  Refills: 0  Active, Disp: , Rfl:     solifenacin (VESICARE) 5 mg tablet, Take 1 tablet (5 mg total) by mouth daily, Disp: 30 tablet, Rfl: 1    TURMERIC PO, Take 1 tablet by mouth every evening, Disp: , Rfl:     vitamin B-12 (VITAMIN B-12) 1,000 mcg tablet, Take 1 tablet (1,000 mcg total) by mouth daily, Disp: 30 tablet, Rfl: 2      Objective:    Vitals:   /68 (BP Location: Right arm, Patient Position: Sitting, Cuff Size: Standard)   Pulse 81   Ht 5' 2\" (1.575 m)   Wt 80.3 kg (177 lb 1.6 oz)   SpO2 98%   BMI 32.39 kg/m²   Body mass index is 32.39 kg/m².  Vitals:    09/16/24 1252   Weight: 80.3 kg (177 lb 1.6 oz)       Physical Exam  Vitals and nursing note reviewed.   Constitutional:       Appearance: Normal appearance. She is obese.   Cardiovascular:      Rate and Rhythm: Normal rate and regular rhythm.      Heart sounds: Normal heart sounds. " "  Pulmonary:      Effort: Pulmonary effort is normal.      Breath sounds: Normal breath sounds.   Abdominal:      Palpations: Abdomen is soft.   Musculoskeletal:      Cervical back: Normal range of motion and neck supple.      Right lower leg: No edema.      Left lower leg: No edema.   Skin:     General: Skin is warm and dry.   Neurological:      General: No focal deficit present.      Mental Status: She is alert and oriented to person, place, and time.   Psychiatric:         Mood and Affect: Mood normal.         Behavior: Behavior normal.         Lab Review   No visits with results within 2 Month(s) from this visit.   Latest known visit with results is:   Office Visit on 06/25/2024   Component Date Value Ref Range Status    POST-VOID RESIDUAL VOLUME, ML POC 06/25/2024 79  mL Final    LEUKOCYTE ESTERASE,UA 06/25/2024 ++   Final    NITRITE,UA 06/25/2024 -   Final    SL AMB POCT UROBILINOGEN 06/25/2024 0.2   Final    POCT URINE PROTEIN 06/25/2024 trace   Final     PH,UA 06/25/2024 7.5   Final    BLOOD,UA 06/25/2024 -   Final    SPECIFIC GRAVITY,UA 06/25/2024 1,010   Final    KETONES,UA 06/25/2024 -   Final    BILIRUBIN,UA 06/25/2024 -   Final    GLUCOSE, UA 06/25/2024 -   Final     COLOR,UA 06/25/2024 yellow   Final    CLARITY,UA 06/25/2024 clear   Final         Tia Warren MD        \"This note has been constructed using a voice recognition system.Therefore there may be syntax, spelling, and/or grammatical errors. Please call if you have any questions. \"  "

## 2024-09-16 ENCOUNTER — OFFICE VISIT (OUTPATIENT)
Dept: FAMILY MEDICINE CLINIC | Facility: CLINIC | Age: 79
End: 2024-09-16
Payer: MEDICARE

## 2024-09-16 VITALS
HEIGHT: 62 IN | DIASTOLIC BLOOD PRESSURE: 68 MMHG | WEIGHT: 177.1 LBS | OXYGEN SATURATION: 98 % | SYSTOLIC BLOOD PRESSURE: 116 MMHG | HEART RATE: 81 BPM | BODY MASS INDEX: 32.59 KG/M2

## 2024-09-16 DIAGNOSIS — Z13.0 SCREENING FOR DEFICIENCY ANEMIA: ICD-10-CM

## 2024-09-16 DIAGNOSIS — R32 URINARY INCONTINENCE, UNSPECIFIED TYPE: ICD-10-CM

## 2024-09-16 DIAGNOSIS — F41.9 ANXIETY AND DEPRESSION: ICD-10-CM

## 2024-09-16 DIAGNOSIS — E78.5 DYSLIPIDEMIA: ICD-10-CM

## 2024-09-16 DIAGNOSIS — R73.03 PREDIABETES: ICD-10-CM

## 2024-09-16 DIAGNOSIS — R73.03 PRE-DIABETES: ICD-10-CM

## 2024-09-16 DIAGNOSIS — R35.1 NOCTURIA: ICD-10-CM

## 2024-09-16 DIAGNOSIS — I10 PRIMARY HYPERTENSION: ICD-10-CM

## 2024-09-16 DIAGNOSIS — Z13.29 SCREENING FOR THYROID DISORDER: ICD-10-CM

## 2024-09-16 DIAGNOSIS — M15.9 PRIMARY OSTEOARTHRITIS INVOLVING MULTIPLE JOINTS: ICD-10-CM

## 2024-09-16 DIAGNOSIS — E66.09 CLASS 1 OBESITY DUE TO EXCESS CALORIES WITH SERIOUS COMORBIDITY AND BODY MASS INDEX (BMI) OF 33.0 TO 33.9 IN ADULT: ICD-10-CM

## 2024-09-16 DIAGNOSIS — R55 NEAR SYNCOPE: ICD-10-CM

## 2024-09-16 DIAGNOSIS — F32.A ANXIETY AND DEPRESSION: ICD-10-CM

## 2024-09-16 DIAGNOSIS — K64.8 OTHER HEMORRHOIDS: ICD-10-CM

## 2024-09-16 DIAGNOSIS — R35.0 URINARY FREQUENCY: Primary | ICD-10-CM

## 2024-09-16 PROCEDURE — 99214 OFFICE O/P EST MOD 30 MIN: CPT | Performed by: INTERNAL MEDICINE

## 2024-09-16 PROCEDURE — G2211 COMPLEX E/M VISIT ADD ON: HCPCS | Performed by: INTERNAL MEDICINE

## 2024-09-16 NOTE — ASSESSMENT & PLAN NOTE
As mentioned above patient with with urinary frequency currently taking oxybutynin.  Last visit I prescribed her Vesicare but she has not started it yet.  She is going to see the ophthalmologist soon as she was concerned about the side effects.

## 2024-09-16 NOTE — ASSESSMENT & PLAN NOTE
BMI is 32.39 again discussed with patient regarding weight reduction lifestyle modification cutting back on calories

## 2024-09-16 NOTE — ASSESSMENT & PLAN NOTE
Increased frequency with urination with nighttime recommended Vesicare but she will is going to check with the ophthalmologist before starting to take that medication.  Currently on oxybutynin which helps to some extent

## 2024-09-27 ENCOUNTER — CLINICAL SUPPORT (OUTPATIENT)
Dept: FAMILY MEDICINE CLINIC | Facility: CLINIC | Age: 79
End: 2024-09-27
Payer: MEDICARE

## 2024-09-27 DIAGNOSIS — Z23 ENCOUNTER FOR IMMUNIZATION: Primary | ICD-10-CM

## 2024-09-27 PROCEDURE — 90662 IIV NO PRSV INCREASED AG IM: CPT

## 2024-09-27 PROCEDURE — G0008 ADMIN INFLUENZA VIRUS VAC: HCPCS

## 2024-10-01 ENCOUNTER — OFFICE VISIT (OUTPATIENT)
Dept: FAMILY MEDICINE CLINIC | Facility: CLINIC | Age: 79
End: 2024-10-01
Payer: MEDICARE

## 2024-10-01 VITALS
OXYGEN SATURATION: 99 % | WEIGHT: 175.2 LBS | BODY MASS INDEX: 32.24 KG/M2 | SYSTOLIC BLOOD PRESSURE: 112 MMHG | HEIGHT: 62 IN | HEART RATE: 82 BPM | DIASTOLIC BLOOD PRESSURE: 70 MMHG

## 2024-10-01 DIAGNOSIS — E78.5 DYSLIPIDEMIA: ICD-10-CM

## 2024-10-01 DIAGNOSIS — F32.A ANXIETY AND DEPRESSION: ICD-10-CM

## 2024-10-01 DIAGNOSIS — E66.09 CLASS 1 OBESITY DUE TO EXCESS CALORIES WITH SERIOUS COMORBIDITY AND BODY MASS INDEX (BMI) OF 33.0 TO 33.9 IN ADULT: ICD-10-CM

## 2024-10-01 DIAGNOSIS — R35.0 URINARY FREQUENCY: ICD-10-CM

## 2024-10-01 DIAGNOSIS — R21 RASH: Primary | ICD-10-CM

## 2024-10-01 DIAGNOSIS — E66.811 CLASS 1 OBESITY DUE TO EXCESS CALORIES WITH SERIOUS COMORBIDITY AND BODY MASS INDEX (BMI) OF 33.0 TO 33.9 IN ADULT: ICD-10-CM

## 2024-10-01 DIAGNOSIS — F41.9 ANXIETY AND DEPRESSION: ICD-10-CM

## 2024-10-01 DIAGNOSIS — I10 PRIMARY HYPERTENSION: ICD-10-CM

## 2024-10-01 PROCEDURE — 99213 OFFICE O/P EST LOW 20 MIN: CPT | Performed by: INTERNAL MEDICINE

## 2024-10-01 PROCEDURE — G2211 COMPLEX E/M VISIT ADD ON: HCPCS | Performed by: INTERNAL MEDICINE

## 2024-10-01 RX ORDER — TRIAMCINOLONE ACETONIDE 5 MG/G
CREAM TOPICAL 2 TIMES DAILY
Qty: 15 G | Refills: 2 | Status: SHIPPED | OUTPATIENT
Start: 2024-10-01

## 2024-10-01 NOTE — ASSESSMENT & PLAN NOTE
As mentioned in the history of present illness patient noted to have rash on the right side of the neck and the back initially subsequently she is also noticing on the left side.  Itching is present but no burning sensation no pain no bodyaches no nausea vomiting or any other associated symptoms.  Rash noted more on the right minimal on the left initially I thought it could be shingles but presentation is on both sides now no pain no discomfort no burning sensation.  Because of the itching we will prescribe triamcinolone cream to see if the rash clears up and follow-up.

## 2024-10-01 NOTE — ASSESSMENT & PLAN NOTE
Today's blood pressure 112/70 not on any medication history of having high blood pressure readings in the past but stable now monitor

## 2024-10-01 NOTE — PROGRESS NOTES
Office Visit Note  10/01/24     Neisha Salazar 79 y.o. female MRN: [id number]  : 1945    Assessment:     1. Rash  Assessment & Plan:  As mentioned in the history of present illness patient noted to have rash on the right side of the neck and the back initially subsequently she is also noticing on the left side.  Itching is present but no burning sensation no pain no bodyaches no nausea vomiting or any other associated symptoms.  Rash noted more on the right minimal on the left initially I thought it could be shingles but presentation is on both sides now no pain no discomfort no burning sensation.  Because of the itching we will prescribe triamcinolone cream to see if the rash clears up and follow-up.  Orders:  -     triamcinolone (KENALOG) 0.5 % cream; Apply topically 2 (two) times a day  2. Anxiety and depression  Assessment & Plan:  Patient takes Xanax occasionally for anxiety symptoms  3. Class 1 obesity due to excess calories with serious comorbidity and body mass index (BMI) of 33.0 to 33.9 in adult  Assessment & Plan:  BMI 32.04 emphasized regarding diet exercise lifestyle modification  4. Dyslipidemia  Assessment & Plan:  Diet control  5. Primary hypertension  Assessment & Plan:  Today's blood pressure 112/70 not on any medication history of having high blood pressure readings in the past but stable now monitor  6. Urinary frequency             Discussion Summary and Plan:  Today's care plan and medications were reviewed with patient in detail and all their questions answered to their satisfaction.    Chief Complaint   Patient presents with    Rash     Rash on her neck       Subjective:  Patient has noticed a rash on the right side of the neck area 2 days ago associated with itching.  No pain no discomfort no burning sensation.  Itching is slightly better compared to before.  Subsequently she also noticed some rash on the left side also.  No fever.  He does not wear any metal chain around the neck.   No rash anywhere else on the body.  No new medications patient had received flu vaccine 3 days prior to this development of rash.    Rash  Pertinent negatives include no cough, fever, shortness of breath, sore throat or vomiting.       The following portions of the patient's history were reviewed and updated as appropriate: allergies, current medications, past family history, past medical history, past social history, past surgical history and problem list.    Review of Systems   Constitutional:  Negative for chills and fever.   HENT:  Negative for ear pain and sore throat.    Eyes:  Negative for pain and visual disturbance.   Respiratory:  Negative for cough and shortness of breath.    Cardiovascular:  Negative for chest pain and palpitations.   Gastrointestinal:  Negative for abdominal pain and vomiting.   Genitourinary:  Negative for dysuria and hematuria.   Musculoskeletal:  Negative for arthralgias and back pain.   Skin:  Positive for rash. Negative for color change.   Neurological:  Negative for seizures and syncope.   All other systems reviewed and are negative.        Historical Information   Patient Active Problem List   Diagnosis    Primary hypertension    Class 1 obesity due to excess calories in adult    Dyslipidemia    Primary osteoarthritis involving multiple joints    Urinary frequency    Muscle cramps    Unsteady gait    Anxiety and depression    Other hemorrhoids    Uterovaginal prolapse    Urinary incontinence    Near syncope    Prediabetes    Nocturia    Rash     Past Medical History:   Diagnosis Date    Anxiety disorder     Arthritis     Bright red rectal bleeding     Colon polyp     DJD (degenerative joint disease)     Hernia A year ago    High cholesterol     Hyperactivity of bladder     Hypertension     Irritable bowel syndrome Last July    Macular degeneration     Obesity     Osteoporosis     Plantar fasciitis     Not sure of date     Past Surgical History:   Procedure Laterality Date     CARDIAC SURGERY      CHOLECYSTECTOMY      COLONOSCOPY      DXA PROCEDURE (HISTORICAL)  06/23/2021    INTRAOCULAR LENS INSERTION      MAMMO (HISTORICAL)  01/04/2022    TONSILLECTOMY      TOTAL SHOULDER REPLACEMENT Left      Social History     Substance and Sexual Activity   Alcohol Use No     Social History     Substance and Sexual Activity   Drug Use Never     Social History     Tobacco Use   Smoking Status Former    Current packs/day: 0.00    Types: Cigarettes    Passive exposure: Past   Smokeless Tobacco Never   Tobacco Comments    quit cigarettes age 27     Family History   Problem Relation Age of Onset    Hearing loss Mother     Esophageal cancer Father     Hypertension Sister     No Known Problems Sister     Esophageal cancer Maternal Grandmother     Diabetes Paternal Grandmother     Breast cancer Maternal Aunt 70    No Known Problems Maternal Aunt     No Known Problems Maternal Aunt     No Known Problems Maternal Aunt     Skin cancer Paternal Aunt     Melanoma Paternal Aunt         unknown    No Known Problems Paternal Aunt     No Known Problems Paternal Aunt     Cancer Paternal Aunt     Breast cancer Cousin 77     Health Maintenance Due   Topic    Hepatitis C Screening     RSV Vaccine Age 60+ Years (1 - 1-dose 60+ series)    Pneumococcal Vaccine: 65+ Years (1 of 1 - PCV)    PT PLAN OF CARE     COVID-19 Vaccine (3 - 2023-24 season)    Depression Screening     Medicare Annual Wellness Visit (AWV)     Fall Risk       Meds/Allergies       Current Outpatient Medications:     ALPRAZolam (XANAX) 0.25 mg tablet, Take 1 tablet (0.25 mg total) by mouth 2 (two) times a day as needed for anxiety, Disp: 40 tablet, Rfl: 0    aspirin (ECOTRIN LOW STRENGTH) 81 mg EC tablet, Take 81 mg by mouth daily, Disp: , Rfl:     Biotin 1000 MCG tablet, Take 1,000 mcg by mouth daily, Disp: , Rfl:     Calcium Acetate, Phos Binder, (CALCIUM ACETATE PO), Take 2 tablets by mouth daily CALCIUM CARBONATE/VITAMIN D3 (CALCIUM 600 WITH VITAMIN D3  "ORAL), Disp: , Rfl:     Cranberry 200 MG CAPS, Take by mouth in the morning, Disp: , Rfl:     docusate sodium (COLACE) 50 mg capsule, Take by mouth, Disp: , Rfl:     Flaxseed, Linseed, (FLAX SEED OIL) 1000 MG CAPS, Flax Seed Oil 1000 MG Oral Capsule  Refills: 0  Active, Disp: , Rfl:     hydrocortisone (ANUSOL-HC) 2.5 % rectal cream, Apply topically 2 (two) times a day, Disp: 28 g, Rfl: 1    Multiple Vitamins-Minerals (PRESERVISION AREDS 2 PO), Take by mouth 2 (two) times a day, Disp: , Rfl:     NATURAL PSYLLIUM SEED PO, Take 1 tablet by mouth every evening, Disp: , Rfl:     Omega-3 Fatty Acids (FISH OIL) 645 MG CAPS, Fish Oil CAPS  Refills: 0  Active, Disp: , Rfl:     Potassium 95 MG TABS, Potassium TABS  Refills: 0  Active, Disp: , Rfl:     solifenacin (VESICARE) 5 mg tablet, Take 1 tablet (5 mg total) by mouth daily, Disp: 30 tablet, Rfl: 1    triamcinolone (KENALOG) 0.5 % cream, Apply topically 2 (two) times a day, Disp: 15 g, Rfl: 2    TURMERIC PO, Take 1 tablet by mouth every evening, Disp: , Rfl:     vitamin B-12 (VITAMIN B-12) 1,000 mcg tablet, Take 1 tablet (1,000 mcg total) by mouth daily, Disp: 30 tablet, Rfl: 2      Objective:    Vitals:   /70 (BP Location: Right arm, Patient Position: Sitting, Cuff Size: Standard)   Pulse 82   Ht 5' 2\" (1.575 m)   Wt 79.5 kg (175 lb 3.2 oz)   SpO2 99%   BMI 32.04 kg/m²   Body mass index is 32.04 kg/m².  Vitals:    10/01/24 1101   Weight: 79.5 kg (175 lb 3.2 oz)       Physical Exam  Vitals and nursing note reviewed.   Constitutional:       Appearance: Normal appearance.   Cardiovascular:      Rate and Rhythm: Normal rate and regular rhythm.      Heart sounds: Normal heart sounds.   Pulmonary:      Effort: Pulmonary effort is normal.      Breath sounds: Normal breath sounds.   Musculoskeletal:      Cervical back: Normal range of motion and neck supple.      Right lower leg: No edema.      Left lower leg: No edema.   Skin:     General: Skin is warm and dry.      " "Comments: Rash noted on the right side of the neck question mild blister formation.  Rash is also noted to develop on the left side of the neck now.     Neurological:      Mental Status: She is alert and oriented to person, place, and time.   Psychiatric:         Mood and Affect: Mood normal.         Behavior: Behavior normal.         Lab Review   No visits with results within 2 Month(s) from this visit.   Latest known visit with results is:   Office Visit on 06/25/2024   Component Date Value Ref Range Status    POST-VOID RESIDUAL VOLUME, ML POC 06/25/2024 79  mL Final    LEUKOCYTE ESTERASE,UA 06/25/2024 ++   Final    NITRITE,UA 06/25/2024 -   Final    SL AMB POCT UROBILINOGEN 06/25/2024 0.2   Final    POCT URINE PROTEIN 06/25/2024 trace   Final     PH,UA 06/25/2024 7.5   Final    BLOOD,UA 06/25/2024 -   Final    SPECIFIC GRAVITY,UA 06/25/2024 1,010   Final    KETONES,UA 06/25/2024 -   Final    BILIRUBIN,UA 06/25/2024 -   Final    GLUCOSE, UA 06/25/2024 -   Final     COLOR,UA 06/25/2024 yellow   Final    CLARITY,UA 06/25/2024 clear   Final         Tia Warren MD        \"This note has been constructed using a voice recognition system.Therefore there may be syntax, spelling, and/or grammatical errors. Please call if you have any questions. \"  "

## 2024-10-09 ENCOUNTER — OFFICE VISIT (OUTPATIENT)
Dept: NEUROLOGY | Facility: CLINIC | Age: 79
End: 2024-10-09
Payer: MEDICARE

## 2024-10-09 VITALS
TEMPERATURE: 97.3 F | OXYGEN SATURATION: 98 % | WEIGHT: 176 LBS | DIASTOLIC BLOOD PRESSURE: 86 MMHG | BODY MASS INDEX: 32.39 KG/M2 | SYSTOLIC BLOOD PRESSURE: 132 MMHG | HEIGHT: 62 IN | HEART RATE: 80 BPM

## 2024-10-09 DIAGNOSIS — R26.81 UNSTEADY GAIT: Primary | ICD-10-CM

## 2024-10-09 DIAGNOSIS — M51.369 DDD (DEGENERATIVE DISC DISEASE), LUMBAR: ICD-10-CM

## 2024-10-09 PROCEDURE — 99214 OFFICE O/P EST MOD 30 MIN: CPT | Performed by: PSYCHIATRY & NEUROLOGY

## 2024-10-09 RX ORDER — OXYBUTYNIN CHLORIDE 5 MG/1
5 TABLET ORAL
COMMUNITY
Start: 2024-09-17

## 2024-10-09 NOTE — PROGRESS NOTES
Return NeuroOutpatient Note        Neisah Salazar  [id number]  79 y.o.  1945       Vitamin B12 deficiency ; Unsteady gait when walking ; and DDD (degenerative disc disease), lumbar        History obtained from:  Patient     HPI/Subjective:    Neisha Salazar is a 78 yo F with PMH of vit B12 def, lumbar DDD presents as f/u. Initially she thought she was referred here for abnormal mri brain in May. It only showed age associated small vessel disease and dilated perivascular space in lower BG of unclear clinical significance. Patient does report some difficulty walking. She would feel off balance. She tried PT for sometime but that hasn't helped. She denies shuffling. No associated tremor. Denies any difficulty lifting her feet. Patient states that since her shoulder surgery in 2013, she would have problem climbing up stairs.   She denies any neck or lower back pain.     Her Vit B12 level was very low at 109. She is taking supplements. Repeat level was 609.   We had ordered MRI LS spine which had revealed facet arthropathy. Anterolaterally directed synovial cyst from the right facet complex measuring 9 x 4 mm that comes in close proximity to the nerve root without impingement. There is a 4 mm anterior directed synovial cyst from the left facet complex that contacts the exiting left L4 nerve root. Larger posteriorly directed synovial cyst from the left facet complex measuring 1 cm. Mild canal stenosis. Lateral recess narrowing. Mild foraminal stenosis.  She's not symptomatic from this so we have deferred referral to pain management. She's not symptomatic from this.      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       Past Medical History:   Diagnosis Date    Anxiety disorder     Arthritis     Bright red rectal bleeding     Colon polyp     DJD (degenerative joint disease)     Hernia A year ago    High cholesterol     Hyperactivity of bladder      Hypertension     Irritable bowel syndrome Last July    Macular degeneration     Obesity     Osteoporosis     Plantar fasciitis     Not sure of date     Social History     Socioeconomic History    Marital status: /Civil Union     Spouse name: Not on file    Number of children: Not on file    Years of education: Not on file    Highest education level: Not on file   Occupational History    Not on file   Tobacco Use    Smoking status: Former     Current packs/day: 0.00     Types: Cigarettes     Passive exposure: Past    Smokeless tobacco: Never    Tobacco comments:     quit cigarettes age 27   Vaping Use    Vaping status: Never Used   Substance and Sexual Activity    Alcohol use: No    Drug use: Never    Sexual activity: Not Currently     Partners: Male     Birth control/protection: Male Sterilization   Other Topics Concern    Not on file   Social History Narrative    Not on file     Social Determinants of Health     Financial Resource Strain: Low Risk  (10/10/2023)    Overall Financial Resource Strain (CARDIA)     Difficulty of Paying Living Expenses: Not very hard   Food Insecurity: Not on file   Transportation Needs: No Transportation Needs (10/10/2023)    PRAPARE - Transportation     Lack of Transportation (Medical): No     Lack of Transportation (Non-Medical): No   Physical Activity: Not on file   Stress: Not on file   Social Connections: Not on file   Intimate Partner Violence: Not on file   Housing Stability: Not on file     Family History   Problem Relation Age of Onset    Hearing loss Mother     Esophageal cancer Father     Hypertension Sister     No Known Problems Sister     Esophageal cancer Maternal Grandmother     Diabetes Paternal Grandmother     Breast cancer Maternal Aunt 70    No Known Problems Maternal Aunt     No Known Problems Maternal Aunt     No Known Problems Maternal Aunt     Skin cancer Paternal Aunt     Melanoma Paternal Aunt         unknown    No Known Problems Paternal Aunt     No  Known Problems Paternal Aunt     Cancer Paternal Aunt     Breast cancer Cousin 77     Allergies   Allergen Reactions    Zithromax [Azithromycin] Swelling    Atorvastatin Other (See Comments)     legs get stiff    Erythromycin Base Palpitations     Current Outpatient Medications on File Prior to Visit   Medication Sig Dispense Refill    ALPRAZolam (XANAX) 0.25 mg tablet Take 1 tablet (0.25 mg total) by mouth 2 (two) times a day as needed for anxiety 40 tablet 0    aspirin (ECOTRIN LOW STRENGTH) 81 mg EC tablet Take 81 mg by mouth daily      Biotin 1000 MCG tablet Take 1,000 mcg by mouth daily      Calcium Acetate, Phos Binder, (CALCIUM ACETATE PO) Take 2 tablets by mouth daily CALCIUM CARBONATE/VITAMIN D3 (CALCIUM 600 WITH VITAMIN D3 ORAL)      Cranberry 200 MG CAPS Take by mouth in the morning      hydrocortisone (ANUSOL-HC) 2.5 % rectal cream Apply topically 2 (two) times a day (Patient taking differently: Apply topically 2 (two) times a day as needed) 28 g 1    Multiple Vitamins-Minerals (PRESERVISION AREDS 2 PO) Take by mouth 2 (two) times a day      NATURAL PSYLLIUM SEED PO Take 1 tablet by mouth every evening      Omega-3 Fatty Acids (FISH OIL) 645 MG CAPS Fish Oil CAPS   Refills: 0    Active      oxybutynin (DITROPAN) 5 mg tablet 5 mg      Potassium 95 MG TABS Potassium TABS   Refills: 0    Active      triamcinolone (KENALOG) 0.5 % cream Apply topically 2 (two) times a day 15 g 2    vitamin B-12 (VITAMIN B-12) 1,000 mcg tablet Take 1 tablet (1,000 mcg total) by mouth daily 30 tablet 2    docusate sodium (COLACE) 50 mg capsule Take by mouth (Patient not taking: Reported on 10/9/2024)      Flaxseed, Linseed, (FLAX SEED OIL) 1000 MG CAPS Flax Seed Oil 1000 MG Oral Capsule   Refills: 0    Active (Patient not taking: Reported on 10/9/2024)      solifenacin (VESICARE) 5 mg tablet Take 1 tablet (5 mg total) by mouth daily (Patient not taking: Reported on 10/9/2024) 30 tablet 1    TURMERIC PO Take 1 tablet by mouth  "every evening (Patient not taking: Reported on 10/9/2024)       No current facility-administered medications on file prior to visit.         Review of Systems   Refer to positive review of systems in HPI.   Review of Systems    Constitutional- No fever  Eyes- No visual change  ENT- Hearing normal  CV- No chest pain  Resp- No Shortness of breath  GI- No diarrhea  - Bladder normal  MS- No Arthritis   Skin- No rash  Psych- No depression  Endo- No DM  Heme- No nodes    Vitals:    10/09/24 1312   BP: 132/86   BP Location: Left arm   Patient Position: Sitting   Cuff Size: Standard   Pulse: 80   Temp: (!) 97.3 °F (36.3 °C)   TempSrc: Tympanic   SpO2: 98%   Weight: 79.8 kg (176 lb)   Height: 5' 2\" (1.575 m)       PHYSICAL EXAM:  Appearance: No Acute Distress  Ophthalmoscopic: Disc Flat, Normal fundus  Mental status:  Orientation: Awake, Alert, and Orientedx3  Memory: Registation 3/3 Recall 3/3  Attention: normal  Knowledge: good  Language: No aphasia  Speech: No dysarthria  Cranial Nerves:  2 No Visual Defect on Confrontation, Pupils round, equal, reactive to light  3,4,6 Extraocular Movements Intact, no nystagmus  5 Facial Sensation Intact  7 No facial asymmetry  8 Intact hearing  9,10 Palate symmetric, normal gag  11 Good shoulder shrug  12 Tongue Midline  Gait: Stable  Coordination: No ataxia with finger to nose testing, and heel to shin  Sensory: Intact, Symmetric to pinprick, light touch, vibration, and joint position  Muscle Tone: Normal              Muscle exam:  Arm Right Left Leg Right Left   Deltoid 5/5 5/5 Iliopsoas 5/5 5/5   Biceps 5/5 5/5 Quads 5/5 5/5   Triceps 5/5 5/5 Hamstrings 5/5 5/5   Wrist Extension 5/5 5/5 Ankle Dorsi Flexion 5/5 5/5   Wrist Flexion 5/5 5/5 Ankle Plantar Flexion 5/5 5/5   Interossei 5/5 5/5 Ankle Eversion 5/5 5/5   APB 5/5 5/5 Ankle Inversion 5/5 5/5       Reflexes   RJ BJ TJ KJ AJ Plantars Paul's   Right 2+ 2+ 2+ 2+ 0 Downgoing Not present   Left 2+ 2+ 2+ 2+ 0 Downgoing Not " present     Personal review of  Labs:                  Diagnoses and all orders for this visit:          1. Unsteady gait        2. DDD (degenerative disc disease), lumbar              Patient is doing better but still has some unsteadiness to her gait.  Her exam is normal. She does have macular degeneration associated vision problem which can affect her gait from depth perception being off as well.   She doesn't have any PD features.   Encouraged physical exercise as much as possible.             Total time of encounter:  30 min  More than 50% of the time was used in counseling and/or coordination of care  Extent of counseling and/or coordination of care        Olga Harmon MD  [facility]  [address] [place] [id number] [phone]

## 2024-10-22 ENCOUNTER — CLINICAL SUPPORT (OUTPATIENT)
Dept: FAMILY MEDICINE CLINIC | Facility: CLINIC | Age: 79
End: 2024-10-22
Payer: MEDICARE

## 2024-10-22 DIAGNOSIS — Z23 NEED FOR COVID-19 VACCINE: Primary | ICD-10-CM

## 2024-10-22 PROCEDURE — 91320 SARSCV2 VAC 30MCG TRS-SUC IM: CPT

## 2024-10-22 PROCEDURE — 90480 ADMN SARSCOV2 VAC 1/ONLY CMP: CPT

## 2024-10-23 ENCOUNTER — HOSPITAL ENCOUNTER (EMERGENCY)
Facility: HOSPITAL | Age: 79
Discharge: HOME | End: 2024-10-23
Attending: EMERGENCY MEDICINE | Admitting: EMERGENCY MEDICINE
Payer: MEDICARE

## 2024-10-23 ENCOUNTER — APPOINTMENT (EMERGENCY)
Dept: RADIOLOGY | Facility: HOSPITAL | Age: 79
End: 2024-10-23
Payer: MEDICARE

## 2024-10-23 VITALS
OXYGEN SATURATION: 97 % | TEMPERATURE: 99.5 F | DIASTOLIC BLOOD PRESSURE: 78 MMHG | HEART RATE: 74 BPM | RESPIRATION RATE: 20 BRPM | SYSTOLIC BLOOD PRESSURE: 171 MMHG

## 2024-10-23 DIAGNOSIS — S43.109A AC SEPARATION: ICD-10-CM
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
## 2024-10-23 DIAGNOSIS — R91.8 LUNG MASS: ICD-10-CM

## 2024-10-23 PROCEDURE — 72125 CT NECK SPINE W/O DYE: CPT

## 2024-10-23 PROCEDURE — 99284 EMERGENCY DEPT VISIT MOD MDM: CPT | Performed by: EMERGENCY MEDICINE

## 2024-10-23 PROCEDURE — 99284 EMERGENCY DEPT VISIT MOD MDM: CPT

## 2024-10-23 PROCEDURE — 70450 CT HEAD/BRAIN W/O DYE: CPT

## 2024-10-23 PROCEDURE — 73030 X-RAY EXAM OF SHOULDER: CPT

## 2024-10-23 NOTE — DISCHARGE INSTRUCTIONS
Please follow-up with your family doctor you have a lung mass in your right lung which needs an evaluation with a CT of the chest with IV contrast which has to be done within the month please call his office to schedule an appointment.  Take Tylenol and ibuprofen for your pain    You have acromioclavicular AC separation, please keep the shoulder in a sling and follow up with your orthopaedist.

## 2024-10-23 NOTE — ED NOTES
Daughter-in-law, Serena Dunn, in route to transport patient home. Pt waiting in waiting room.      Kya Salazar RN  10/23/24 1918

## 2024-10-23 NOTE — ED PROVIDER NOTES
Time reflects when diagnosis was documented in both MDM as applicable and the Disposition within this note       Time User Action Codes Description Comment    10/23/2024  1:53 PM AnepuJenniferTosah Add [W19.XXXA] Fall, initial encounter     10/23/2024  1:54 PM Anepu, Tosha Add [R91.8] Lung mass     10/23/2024  5:35 PM Anepu, Tosha Add [S43.109A] AC separation           ED Disposition       ED Disposition   Discharge    Condition   Stable    Date/Time   Wed Oct 23, 2024  1:53 PM    Comment   Neisha Salazar discharge to home/self care.                   Assessment & Plan       Medical Decision Making  Patient evaluated in the ED with imaging I reviewed the results with the patient discharged with follow-up she verbalized understanding had no further questions at time of discharge.  I discussed the finding of the lung mass as well with the patient I secure chatted texted her PCP about her CT results of the C-spine which showed a lung mass which needs further evaluation she verbalized understanding that she needs to follow-up with her PCP    After patient was discharged radiology read her shoulder x-ray as AC joint separation so I called her and informed her of the results she said she will procure a sling and will follow-up with her orthopedist Dr. Guan she said she has access to MyChart so I changed her diagnosis in the chart and she can show her x-ray results to Dr. Guan who no longer works with [facility].    Problems Addressed:  AC separation: acute illness or injury  Fall, initial encounter: acute illness or injury  Lung mass: acute illness or injury    Amount and/or Complexity of Data Reviewed  External Data Reviewed: notes.  Radiology: ordered. Decision-making details documented in ED Course.             Medications - No data to display    ED Risk Strat Scores                                               History of Present Illness       Chief Complaint   Patient presents with    Fall     States fell last  night in dark after slipping on tool on floor she did not know was there, pain to L shoulder which was already replaced. Hit head on something, some neck pain, collar applied. No loc no thinners does take an aspirin       Past Medical History:   Diagnosis Date    Anxiety disorder     Arthritis     Bright red rectal bleeding     Colon polyp     DJD (degenerative joint disease)     Hernia A year ago    High cholesterol     Hyperactivity of bladder     Hypertension     Irritable bowel syndrome Last July    Macular degeneration     Obesity     Osteoporosis     Plantar fasciitis     Not sure of date      Past Surgical History:   Procedure Laterality Date    CARDIAC SURGERY      CHOLECYSTECTOMY      COLONOSCOPY      DXA PROCEDURE (HISTORICAL)  06/23/2021    INTRAOCULAR LENS INSERTION      MAMMO (HISTORICAL)  01/04/2022    TONSILLECTOMY      TOTAL SHOULDER REPLACEMENT Left       Family History   Problem Relation Age of Onset    Hearing loss Mother     Esophageal cancer Father     Hypertension Sister     No Known Problems Sister     Esophageal cancer Maternal Grandmother     Diabetes Paternal Grandmother     Breast cancer Maternal Aunt 70    No Known Problems Maternal Aunt     No Known Problems Maternal Aunt     No Known Problems Maternal Aunt     Skin cancer Paternal Aunt     Melanoma Paternal Aunt         unknown    No Known Problems Paternal Aunt     No Known Problems Paternal Aunt     Cancer Paternal Aunt     Breast cancer Cousin 77      Social History     Tobacco Use    Smoking status: Former     Current packs/day: 0.00     Types: Cigarettes     Passive exposure: Past    Smokeless tobacco: Never    Tobacco comments:     quit cigarettes age 27   Vaping Use    Vaping status: Never Used   Substance Use Topics    Alcohol use: No    Drug use: Never      E-Cigarette/Vaping    E-Cigarette Use Never User       E-Cigarette/Vaping Substances    Nicotine No     THC No     CBD No     Flavoring No     Other No     Unknown No        I have reviewed and agree with the history as documented.     79-year-old female presents after a fall in the morning she fell onto her left side hurt her left shoulder hit her head has some neck pain denies any other injuries and complaints of back pain abdominal pain ambulated after the event not on blood thinners.      History provided by:  Patient   used: No        Review of Systems   Constitutional: Negative.         Headache neck pain shoulder pain   HENT: Negative.     Eyes: Negative.    Respiratory: Negative.     Cardiovascular: Negative.    Gastrointestinal: Negative.    Endocrine: Negative.    Genitourinary: Negative.    Musculoskeletal: Negative.    Skin: Negative.    Allergic/Immunologic: Negative.    Neurological: Negative.    Hematological: Negative.    Psychiatric/Behavioral: Negative.     All other systems reviewed and are negative.          Objective       ED Triage Vitals [10/23/24 1214]   Temperature Pulse Blood Pressure Respirations SpO2 Patient Position - Orthostatic VS   99.5 °F (37.5 °C) 74 (!) 171/78 20 97 % Sitting      Temp Source Heart Rate Source BP Location FiO2 (%) Pain Score    Tympanic Monitor Right arm -- 5      Vitals      Date and Time Temp Pulse SpO2 Resp BP Pain Score FACES Pain Rating User   10/23/24 1214 99.5 °F (37.5 °C) 74 97 % 20 171/78 5 -- LS            Physical Exam  Vitals and nursing note reviewed.   Constitutional:       Appearance: Normal appearance.   HENT:      Head: Normocephalic and atraumatic.      Nose: Nose normal.      Mouth/Throat:      Mouth: Mucous membranes are moist.   Eyes:      Extraocular Movements: Extraocular movements intact.      Pupils: Pupils are equal, round, and reactive to light.   Cardiovascular:      Rate and Rhythm: Normal rate and regular rhythm.   Pulmonary:      Effort: Pulmonary effort is normal.      Breath sounds: Normal breath sounds.   Abdominal:      General: Abdomen is flat. Bowel sounds are normal.       Palpations: Abdomen is soft.   Musculoskeletal:         General: Normal range of motion.      Cervical back: Normal range of motion and neck supple.      Comments: Left shoulder tenderness to palpation along the acromioclavicular joint range of motion intact restricted by pain axillary nerve is intact.  No deformity noted.   Skin:     General: Skin is warm.      Capillary Refill: Capillary refill takes less than 2 seconds.   Neurological:      General: No focal deficit present.      Mental Status: She is alert and oriented to person, place, and time. Mental status is at baseline.      Cranial Nerves: No cranial nerve deficit.      Sensory: No sensory deficit.      Motor: No weakness.      Coordination: Coordination normal.      Gait: Gait normal.      Deep Tendon Reflexes: Reflexes normal.   Psychiatric:         Mood and Affect: Mood normal.         Thought Content: Thought content normal.         Results Reviewed       None            XR shoulder 2+ views LEFT   Final Interpretation by Mack Cardenas MD (10/23 4443)      Acromioclavicular joint separation with clavicle elevated 1.9 cm relative to the acromion.      Glenohumeral joint prosthesis appears satisfactory.      The study was marked in EPIC for immediate notification.         Computerized Assisted Algorithm (CAA) may have been used to analyze all applicable images.         Workstation performed: [id number]         CT head without contrast   Final Interpretation by Alex Sifuentes MD (10/23 1086)      No acute intracranial abnormality.                  Workstation performed: [id number]         CT spine cervical without contrast   Final Interpretation by Alex Sifuentes MD (10/23 4138)      No cervical spine fracture or traumatic malalignment.      Right upper lobe mass as above, indeterminate and not well characterized on this exam. Recommend further work-up with CT chest with contrast.      The study was marked in EPIC for immediate  notification.            Workstation performed: [id number]             Procedures    ED Medication and Procedure Management   Prior to Admission Medications   Prescriptions Last Dose Informant Patient Reported? Taking?   ALPRAZolam (XANAX) 0.25 mg tablet   No No   Sig: Take 1 tablet (0.25 mg total) by mouth 2 (two) times a day as needed for anxiety   Biotin 1000 MCG tablet  Self Yes No   Sig: Take 1,000 mcg by mouth daily   Calcium Acetate, Phos Binder, (CALCIUM ACETATE PO)  Self Yes No   Sig: Take 2 tablets by mouth daily CALCIUM CARBONATE/VITAMIN D3 (CALCIUM 600 WITH VITAMIN D3 ORAL)   Cranberry 200 MG CAPS  Self Yes No   Sig: Take by mouth in the morning   Flaxseed, Linseed, (FLAX SEED OIL) 1000 MG CAPS  Self Yes No   Sig: Flax Seed Oil 1000 MG Oral Capsule   Refills: 0    Active   Patient not taking: Reported on 10/9/2024   Multiple Vitamins-Minerals (PRESERVISION AREDS 2 PO)  Self Yes No   Sig: Take by mouth 2 (two) times a day   NATURAL PSYLLIUM SEED PO  Self Yes No   Sig: Take 1 tablet by mouth every evening   Omega-3 Fatty Acids (FISH OIL) 645 MG CAPS  Self Yes No   Sig: Fish Oil CAPS   Refills: 0    Active   Potassium 95 MG TABS  Self Yes No   Sig: Potassium TABS   Refills: 0    Active   TURMERIC PO  Self Yes No   Sig: Take 1 tablet by mouth every evening   Patient not taking: Reported on 10/9/2024   aspirin (ECOTRIN LOW STRENGTH) 81 mg EC tablet  Self Yes No   Sig: Take 81 mg by mouth daily   docusate sodium (COLACE) 50 mg capsule  Self Yes No   Sig: Take by mouth   Patient not taking: Reported on 10/9/2024   hydrocortisone (ANUSOL-HC) 2.5 % rectal cream  Self No No   Sig: Apply topically 2 (two) times a day   Patient taking differently: Apply topically 2 (two) times a day as needed   oxybutynin (DITROPAN) 5 mg tablet   Yes No   Si mg   solifenacin (VESICARE) 5 mg tablet   No No   Sig: Take 1 tablet (5 mg total) by mouth daily   Patient not taking: Reported on 10/9/2024   triamcinolone (KENALOG)  0.5 % cream   No No   Sig: Apply topically 2 (two) times a day   vitamin B-12 (VITAMIN B-12) 1,000 mcg tablet  Self No No   Sig: Take 1 tablet (1,000 mcg total) by mouth daily      Facility-Administered Medications: None     Discharge Medication List as of 10/23/2024  1:54 PM        CONTINUE these medications which have NOT CHANGED    Details   ALPRAZolam (XANAX) 0.25 mg tablet Take 1 tablet (0.25 mg total) by mouth 2 (two) times a day as needed for anxiety, Starting Wed 7/10/2024, Normal      aspirin (ECOTRIN LOW STRENGTH) 81 mg EC tablet Take 81 mg by mouth daily, Historical Med      Biotin 1000 MCG tablet Take 1,000 mcg by mouth daily, Historical Med      Calcium Acetate, Phos Binder, (CALCIUM ACETATE PO) Take 2 tablets by mouth daily CALCIUM CARBONATE/VITAMIN D3 (CALCIUM 600 WITH VITAMIN D3 ORAL), Historical Med      Cranberry 200 MG CAPS Take by mouth in the morning, Historical Med      docusate sodium (COLACE) 50 mg capsule Take by mouth, Historical Med      Flaxseed, Linseed, (FLAX SEED OIL) 1000 MG CAPS Flax Seed Oil 1000 MG Oral Capsule   Refills: 0    Active, Historical Med      hydrocortisone (ANUSOL-HC) 2.5 % rectal cream Apply topically 2 (two) times a day, Starting Thu 5/9/2024, Normal      Multiple Vitamins-Minerals (PRESERVISION AREDS 2 PO) Take by mouth 2 (two) times a day, Historical Med      NATURAL PSYLLIUM SEED PO Take 1 tablet by mouth every evening, Historical Med      Omega-3 Fatty Acids (FISH OIL) 645 MG CAPS Fish Oil CAPS   Refills: 0    Active, Historical Med      oxybutynin (DITROPAN) 5 mg tablet 5 mg, Starting Tue 9/17/2024, Historical Med      Potassium 95 MG TABS Potassium TABS   Refills: 0    Active, Historical Med      solifenacin (VESICARE) 5 mg tablet Take 1 tablet (5 mg total) by mouth daily, Starting Wed 7/10/2024, Normal      triamcinolone (KENALOG) 0.5 % cream Apply topically 2 (two) times a day, Starting Tue 10/1/2024, Normal      TURMERIC PO Take 1 tablet by mouth every  evening, Historical Med      vitamin B-12 (VITAMIN B-12) 1,000 mcg tablet Take 1 tablet (1,000 mcg total) by mouth daily, Starting Wed 4/3/2024, Normal           No discharge procedures on file.  ED SEPSIS DOCUMENTATION   Time reflects when diagnosis was documented in both MDM as applicable and the Disposition within this note       Time User Action Codes Description Comment    10/23/2024  1:53 PM Tosha Rose [W19.XXXA] Fall, initial encounter     10/23/2024  1:54 PM Tosha Rose [R91.8] Lung mass     10/23/2024  5:35 PM Tosha Rose [S43.109A] AC separation                  Tosha Krausu, DO  10/24/24 5239

## 2024-10-24 ENCOUNTER — TELEPHONE (OUTPATIENT)
Dept: FAMILY MEDICINE CLINIC | Facility: CLINIC | Age: 79
End: 2024-10-24

## 2024-10-24 DIAGNOSIS — R91.8 MASS OF UPPER LOBE OF RIGHT LUNG: Primary | ICD-10-CM

## 2024-10-24 NOTE — PROGRESS NOTES
Patient with mass noted on the right upper lobe on the CT scan of the cervical spine.  CT scan of the chest with contrast was recommended by the radiologist for further evaluation.  I discussed with the patient at length he will be going for blood work for the BUN and creatinine.  I have ordered the CT scan of the chest with contrast.

## 2024-10-25 ENCOUNTER — TELEPHONE (OUTPATIENT)
Dept: FAMILY MEDICINE CLINIC | Facility: CLINIC | Age: 79
End: 2024-10-25

## 2024-10-25 ENCOUNTER — OFFICE VISIT (OUTPATIENT)
Age: 79
End: 2024-10-25
Payer: MEDICARE

## 2024-10-25 VITALS
BODY MASS INDEX: 32.39 KG/M2 | SYSTOLIC BLOOD PRESSURE: 170 MMHG | DIASTOLIC BLOOD PRESSURE: 101 MMHG | WEIGHT: 176 LBS | HEIGHT: 62 IN | HEART RATE: 73 BPM

## 2024-10-25 DIAGNOSIS — Z96.612 HISTORY OF ARTHROPLASTY OF LEFT SHOULDER: ICD-10-CM

## 2024-10-25 DIAGNOSIS — E55.9 VITAMIN D DEFICIENCY: ICD-10-CM

## 2024-10-25 DIAGNOSIS — E53.8 B12 DEFICIENCY: Primary | ICD-10-CM

## 2024-10-25 DIAGNOSIS — S43.102A: Primary | ICD-10-CM

## 2024-10-25 PROCEDURE — 99203 OFFICE O/P NEW LOW 30 MIN: CPT | Performed by: ORTHOPAEDIC SURGERY

## 2024-10-25 NOTE — PROGRESS NOTES
Assessment/Plan:  1. Separation of AC joint, left, initial encounter  Ambulatory referral to Physical Therapy      2. History of arthroplasty of left shoulder          Scribe Attestation      I,:  Wilma Ramseyелена am acting as a scribe while in the presence of the attending physician.:       I,:  Sammy Pierre MD personally performed the services described in this documentation    as scribed in my presence.:           Diagnostics reviewed and physical exam performed.  Diagnosis, treatment options and associated risks were discussed with the patient including no treatment, nonsurgical treatment and potential for surgical intervention.  The patient was given the opportunity to ask questions regarding each.  X-ray reviewed with the patient demonstrating a grade 2-3 AC joint separation, which can be treated nonoperatively with a period of immobilization in a sling as needed for comfort, an order will also be placed today for Neisha to initiate physical therapy after inflammation in the area improves and pain begins to subside.  We did briefly discuss her reverse total shoulder arthroplasty which does show some scapular notching however she appears to be asymptomatic of it at this point.  We can continue to watch this over time.  She may continue managing her pain with over-the-counter medication.  Will plan to see her back in the office in 6 weeks for reevaluation with x-ray of the left shoulder.     Subjective:   Neisha Salazar is a 79 y.o. female who presents for initial evaluation of left AC joint separation sustained two days ago after a fall directly onto the lateral shoulder.  She has a history of total shoulder arthroplasty performed 15 years ago by Dr. Guan with good outcome.  She does note some limitation with range of motion and difficulty with certain activities such as clasping her bra.  Today she denies distal paresthesias, mechanical symptoms, sensation of instability.  Her pain is  well-controlled with over-the-counter medication.  She is using a sling for comfort.      Review of Systems   Constitutional:  Negative for chills and fever.   HENT:  Negative for ear pain and sore throat.    Eyes:  Negative for pain and visual disturbance.   Respiratory:  Negative for cough and shortness of breath.    Cardiovascular:  Negative for chest pain and palpitations.   Gastrointestinal:  Negative for abdominal pain and vomiting.   Genitourinary:  Negative for dysuria and hematuria.   Musculoskeletal:  Positive for arthralgias. Negative for back pain.   Skin:  Negative for color change and rash.   Neurological:  Negative for seizures and syncope.   All other systems reviewed and are negative.        Past Medical History:   Diagnosis Date    Anxiety disorder     Arthritis     Bright red rectal bleeding     Colon polyp     DJD (degenerative joint disease)     Hernia A year ago    High cholesterol     Hyperactivity of bladder     Hypertension     Irritable bowel syndrome Last July    Macular degeneration     Obesity     Osteoporosis     Plantar fasciitis     Not sure of date       Past Surgical History:   Procedure Laterality Date    CARDIAC SURGERY      CHOLECYSTECTOMY      COLONOSCOPY      DXA PROCEDURE (HISTORICAL)  06/23/2021    INTRAOCULAR LENS INSERTION      MAMMO (HISTORICAL)  01/04/2022    TONSILLECTOMY      TOTAL SHOULDER REPLACEMENT Left        Family History   Problem Relation Age of Onset    Hearing loss Mother     Esophageal cancer Father     Hypertension Sister     No Known Problems Sister     Esophageal cancer Maternal Grandmother     Diabetes Paternal Grandmother     Breast cancer Maternal Aunt 70    No Known Problems Maternal Aunt     No Known Problems Maternal Aunt     No Known Problems Maternal Aunt     Skin cancer Paternal Aunt     Melanoma Paternal Aunt         unknown    No Known Problems Paternal Aunt     No Known Problems Paternal Aunt     Cancer Paternal Aunt     Breast cancer Cousin  77       Social History     Occupational History    Not on file   Tobacco Use    Smoking status: Former     Current packs/day: 0.00     Types: Cigarettes     Passive exposure: Past    Smokeless tobacco: Never    Tobacco comments:     quit cigarettes age 27   Vaping Use    Vaping status: Never Used   Substance and Sexual Activity    Alcohol use: No    Drug use: Never    Sexual activity: Not Currently     Partners: Male     Birth control/protection: Male Sterilization         Current Outpatient Medications:     ALPRAZolam (XANAX) 0.25 mg tablet, Take 1 tablet (0.25 mg total) by mouth 2 (two) times a day as needed for anxiety, Disp: 40 tablet, Rfl: 0    aspirin (ECOTRIN LOW STRENGTH) 81 mg EC tablet, Take 81 mg by mouth daily, Disp: , Rfl:     Biotin 1000 MCG tablet, Take 1,000 mcg by mouth daily, Disp: , Rfl:     Calcium Acetate, Phos Binder, (CALCIUM ACETATE PO), Take 2 tablets by mouth daily CALCIUM CARBONATE/VITAMIN D3 (CALCIUM 600 WITH VITAMIN D3 ORAL), Disp: , Rfl:     Cranberry 200 MG CAPS, Take by mouth in the morning, Disp: , Rfl:     docusate sodium (COLACE) 50 mg capsule, Take by mouth (Patient not taking: Reported on 10/9/2024), Disp: , Rfl:     Flaxseed, Linseed, (FLAX SEED OIL) 1000 MG CAPS, Flax Seed Oil 1000 MG Oral Capsule  Refills: 0  Active (Patient not taking: Reported on 10/9/2024), Disp: , Rfl:     hydrocortisone (ANUSOL-HC) 2.5 % rectal cream, Apply topically 2 (two) times a day (Patient taking differently: Apply topically 2 (two) times a day as needed), Disp: 28 g, Rfl: 1    Multiple Vitamins-Minerals (PRESERVISION AREDS 2 PO), Take by mouth 2 (two) times a day, Disp: , Rfl:     NATURAL PSYLLIUM SEED PO, Take 1 tablet by mouth every evening, Disp: , Rfl:     Omega-3 Fatty Acids (FISH OIL) 645 MG CAPS, Fish Oil CAPS  Refills: 0  Active, Disp: , Rfl:     oxybutynin (DITROPAN) 5 mg tablet, 5 mg, Disp: , Rfl:     Potassium 95 MG TABS, Potassium TABS  Refills: 0  Active, Disp: , Rfl:     solifenacin  (VESICARE) 5 mg tablet, Take 1 tablet (5 mg total) by mouth daily (Patient not taking: Reported on 10/9/2024), Disp: 30 tablet, Rfl: 1    triamcinolone (KENALOG) 0.5 % cream, Apply topically 2 (two) times a day, Disp: 15 g, Rfl: 2    TURMERIC PO, Take 1 tablet by mouth every evening (Patient not taking: Reported on 10/9/2024), Disp: , Rfl:     vitamin B-12 (VITAMIN B-12) 1,000 mcg tablet, Take 1 tablet (1,000 mcg total) by mouth daily, Disp: 30 tablet, Rfl: 2    Allergies   Allergen Reactions    Zithromax [Azithromycin] Swelling    Atorvastatin Other (See Comments)     legs get stiff    Erythromycin Base Palpitations       Objective:  Vitals:    10/25/24 1356   BP: (!) 170/101   Pulse: 73       Ortho Exam  Left Shoulder Exam  Alignment / Posture:  Normal shoulder posture.  Inspection:   Moderate swelling. No erythema. No ecchymosis. No muscle atrophy. Distal clavicle deformity.  Palpation:   moderate tenderness at fracture site. No warmth. No crepitus. No clicking, catching, or snapping.  ROM:   Limited due to pain  Strength:  Not tested.  Stability:  No objective shoulder instability.  Tests: (+) Painful arc.  Neurovascular:  Sensation intact in Ax/R/M/U nerve distributions. 2+ radial pulse. Brisk capillary refill in all fingertips.      Physical Exam  Constitutional:       General: She is not in acute distress.     Appearance: Normal appearance.   HENT:      Head: Normocephalic and atraumatic.   Eyes:      General: No scleral icterus.     Extraocular Movements: Extraocular movements intact.      Conjunctiva/sclera: Conjunctivae normal.   Cardiovascular:      Pulses: Normal pulses.   Pulmonary:      Effort: Pulmonary effort is normal. No respiratory distress.   Musculoskeletal:      Cervical back: Normal range of motion and neck supple.      Comments: See Ortho Exam   Skin:     General: Skin is warm and dry.   Neurological:      General: No focal deficit present.      Mental Status: She is alert and oriented to  person, place, and time.         I have personally reviewed pertinent films in PACS and my interpretation is as follows:  X-ray of left shoulder obtained 10/23/2024 reviewed and demonstrates grade 2-3 AC Joint separation without evidence of acute fracture. Notching of glenoid component of her TSA prosthesis.       This document was created using speech voice recognition software.   Grammatical errors, random word insertions, pronoun errors, and incomplete sentences are an occasional consequence of this system due to software limitations, ambient noise, and hardware issues.   Any formal questions or concerns about content, text, or information contained within the body of this dictation should be directly addressed to the provider for clarification.

## 2024-10-26 ENCOUNTER — APPOINTMENT (OUTPATIENT)
Dept: LAB | Facility: HOSPITAL | Age: 79
End: 2024-10-26
Payer: MEDICARE

## 2024-10-26 DIAGNOSIS — R73.03 PRE-DIABETES: ICD-10-CM

## 2024-10-26 DIAGNOSIS — Z13.0 SCREENING FOR DEFICIENCY ANEMIA: ICD-10-CM

## 2024-10-26 DIAGNOSIS — E53.8 B12 DEFICIENCY: ICD-10-CM

## 2024-10-26 DIAGNOSIS — E78.5 DYSLIPIDEMIA: ICD-10-CM

## 2024-10-26 DIAGNOSIS — Z13.29 SCREENING FOR THYROID DISORDER: ICD-10-CM

## 2024-10-26 DIAGNOSIS — E55.9 VITAMIN D DEFICIENCY: ICD-10-CM

## 2024-10-26 LAB
25(OH)D3 SERPL-MCNC: 69.3 NG/ML (ref 30–100)
ALBUMIN SERPL BCG-MCNC: 3.5 G/DL (ref 3.5–5)
ALP SERPL-CCNC: 68 U/L (ref 34–104)
ALT SERPL W P-5'-P-CCNC: 11 U/L (ref 7–52)
ANION GAP SERPL CALCULATED.3IONS-SCNC: 6 MMOL/L (ref 4–13)
AST SERPL W P-5'-P-CCNC: 9 U/L (ref 13–39)
BASOPHILS # BLD AUTO: 0.04 THOUSANDS/ΜL (ref 0–0.1)
BASOPHILS NFR BLD AUTO: 1 % (ref 0–1)
BILIRUB SERPL-MCNC: 0.42 MG/DL (ref 0.2–1)
BILIRUB UR QL STRIP: NEGATIVE
BUN SERPL-MCNC: 11 MG/DL (ref 5–25)
CALCIUM SERPL-MCNC: 8.6 MG/DL (ref 8.4–10.2)
CHLORIDE SERPL-SCNC: 106 MMOL/L (ref 96–108)
CHOLEST SERPL-MCNC: 185 MG/DL
CLARITY UR: NORMAL
CO2 SERPL-SCNC: 26 MMOL/L (ref 21–32)
COLOR UR: NORMAL
CREAT SERPL-MCNC: 0.68 MG/DL (ref 0.6–1.3)
EOSINOPHIL # BLD AUTO: 0.2 THOUSAND/ΜL (ref 0–0.61)
EOSINOPHIL NFR BLD AUTO: 5 % (ref 0–6)
ERYTHROCYTE [DISTWIDTH] IN BLOOD BY AUTOMATED COUNT: 14.3 % (ref 11.6–15.1)
EST. AVERAGE GLUCOSE BLD GHB EST-MCNC: 117 MG/DL
GFR SERPL CREATININE-BSD FRML MDRD: 84 ML/MIN/1.73SQ M
GLUCOSE P FAST SERPL-MCNC: 103 MG/DL (ref 65–99)
GLUCOSE UR STRIP-MCNC: NEGATIVE MG/DL
HBA1C MFR BLD: 5.7 %
HCT VFR BLD AUTO: 37.6 % (ref 34.8–46.1)
HDLC SERPL-MCNC: 54 MG/DL
HGB BLD-MCNC: 12.1 G/DL (ref 11.5–15.4)
HGB UR QL STRIP.AUTO: NEGATIVE
IMM GRANULOCYTES # BLD AUTO: 0.01 THOUSAND/UL (ref 0–0.2)
IMM GRANULOCYTES NFR BLD AUTO: 0 % (ref 0–2)
KETONES UR STRIP-MCNC: NEGATIVE MG/DL
LDLC SERPL CALC-MCNC: 115 MG/DL (ref 0–100)
LEUKOCYTE ESTERASE UR QL STRIP: NEGATIVE
LYMPHOCYTES # BLD AUTO: 1.32 THOUSANDS/ΜL (ref 0.6–4.47)
LYMPHOCYTES NFR BLD AUTO: 34 % (ref 14–44)
MCH RBC QN AUTO: 29.8 PG (ref 26.8–34.3)
MCHC RBC AUTO-ENTMCNC: 32.2 G/DL (ref 31.4–37.4)
MCV RBC AUTO: 93 FL (ref 82–98)
MONOCYTES # BLD AUTO: 0.52 THOUSAND/ΜL (ref 0.17–1.22)
MONOCYTES NFR BLD AUTO: 13 % (ref 4–12)
NEUTROPHILS # BLD AUTO: 1.84 THOUSANDS/ΜL (ref 1.85–7.62)
NEUTS SEG NFR BLD AUTO: 47 % (ref 43–75)
NITRITE UR QL STRIP: NEGATIVE
NONHDLC SERPL-MCNC: 131 MG/DL
NRBC BLD AUTO-RTO: 0 /100 WBCS
PH UR STRIP.AUTO: 7 [PH]
PLATELET # BLD AUTO: 263 THOUSANDS/UL (ref 149–390)
PMV BLD AUTO: 9.8 FL (ref 8.9–12.7)
POTASSIUM SERPL-SCNC: 4 MMOL/L (ref 3.5–5.3)
PROT SERPL-MCNC: 6.3 G/DL (ref 6.4–8.4)
PROT UR STRIP-MCNC: NEGATIVE MG/DL
RBC # BLD AUTO: 4.06 MILLION/UL (ref 3.81–5.12)
SODIUM SERPL-SCNC: 138 MMOL/L (ref 135–147)
SP GR UR STRIP.AUTO: 1.02 (ref 1–1.03)
TRIGL SERPL-MCNC: 81 MG/DL
TSH SERPL DL<=0.05 MIU/L-ACNC: 2.19 UIU/ML (ref 0.45–4.5)
UROBILINOGEN UR STRIP-ACNC: <2 MG/DL
VIT B12 SERPL-MCNC: 386 PG/ML (ref 180–914)
WBC # BLD AUTO: 3.93 THOUSAND/UL (ref 4.31–10.16)

## 2024-10-26 PROCEDURE — 82306 VITAMIN D 25 HYDROXY: CPT

## 2024-10-26 PROCEDURE — 83036 HEMOGLOBIN GLYCOSYLATED A1C: CPT

## 2024-10-26 PROCEDURE — 85025 COMPLETE CBC W/AUTO DIFF WBC: CPT

## 2024-10-26 PROCEDURE — 80053 COMPREHEN METABOLIC PANEL: CPT

## 2024-10-26 PROCEDURE — 80061 LIPID PANEL: CPT

## 2024-10-26 PROCEDURE — 84443 ASSAY THYROID STIM HORMONE: CPT

## 2024-10-26 PROCEDURE — 36415 COLL VENOUS BLD VENIPUNCTURE: CPT

## 2024-10-26 PROCEDURE — 82607 VITAMIN B-12: CPT

## 2024-10-26 PROCEDURE — 81003 URINALYSIS AUTO W/O SCOPE: CPT

## 2024-10-28 ENCOUNTER — OFFICE VISIT (OUTPATIENT)
Age: 79
End: 2024-10-28
Payer: MEDICARE

## 2024-10-28 VITALS
RESPIRATION RATE: 16 BRPM | HEART RATE: 90 BPM | WEIGHT: 176 LBS | BODY MASS INDEX: 32.39 KG/M2 | HEIGHT: 62 IN | SYSTOLIC BLOOD PRESSURE: 130 MMHG | DIASTOLIC BLOOD PRESSURE: 84 MMHG

## 2024-10-28 DIAGNOSIS — M79.672 PAIN IN BOTH FEET: ICD-10-CM

## 2024-10-28 DIAGNOSIS — B35.1 ONYCHOMYCOSIS: ICD-10-CM

## 2024-10-28 DIAGNOSIS — M79.671 PAIN IN BOTH FEET: ICD-10-CM

## 2024-10-28 DIAGNOSIS — I70.209 PERIPHERAL ARTERIOSCLEROSIS (HCC): Primary | ICD-10-CM

## 2024-10-28 PROCEDURE — RECHECK: Performed by: PODIATRIST

## 2024-10-28 PROCEDURE — 11721 DEBRIDE NAIL 6 OR MORE: CPT | Performed by: PODIATRIST

## 2024-10-28 NOTE — PROGRESS NOTES
Assessment/Plan:  Pain.  Mycosis of nail.  Paronychia.  Peripheral artery disease.     Plan.  Chart reviewed.  Foot exam performed.  Patient educated on condition.  All mycotic nails debrided without pain or complication.  Nails debrided mechanically with nail nipper.  Aftercare instruction given.  Return for follow-up     Subjective:   Patient complains of pain in her feet with ambulation.  No history of trauma.             Past Medical History:   Diagnosis Date    Hypertension      Macular degeneration                     Past Surgical History:   Procedure Laterality Date    INTRAOCULAR LENS INSERTION                       Allergies   Allergen Reactions    Atorvastatin Other (See Comments)       legs get stiff    Zithromax [Azithromycin]      Erythromycin Base Palpitations            Current Outpatient Prescriptions:     Calcium Carb-Cholecalciferol (CALCIUM 1000 + D) 1000-800 MG-UNIT TABS, Calcium TABS  Refills: 0  Active, Disp: , Rfl:     cholecalciferol (VITAMIN D3) 1,000 units tablet, Vitamin D TABS  Refills: 0  Active, Disp: , Rfl:     Cinnamon 500 MG capsule, Cinnamon CAPS  Refills: 0  Active, Disp: , Rfl:     cycloSPORINE (RESTASIS) 0.05 % ophthalmic emulsion, Restasis 0.05 % Ophthalmic Emulsion  Refills: 0  Active, Disp: , Rfl:     Flaxseed, Linseed, (FLAX SEED OIL) 1000 MG CAPS, Flax Seed Oil 1000 MG Oral Capsule  Refills: 0  Active, Disp: , Rfl:     fluticasone (FLONASE) 50 mcg/act nasal spray, Fluticasone Propionate 50 MCG/ACT Nasal Suspension  Quantity: 16;  Refills: 0   Started 29-Nov-2014 Active, Disp: , Rfl:     Magnesium 100 MG CAPS, Magnesium TABS  Refills: 0  Active, Disp: , Rfl:     Omega-3 Fatty Acids (FISH OIL) 645 MG CAPS, Fish Oil CAPS  Refills: 0  Active, Disp: , Rfl:     Potassium 95 MG TABS, Potassium TABS  Refills: 0  Active, Disp: , Rfl:     telmisartan (MICARDIS) 80 MG tablet, Micardis 80 MG Oral Tablet  Refills: 0  Active, Disp: , Rfl:      There is no problem list on file for this  patient.            Patient ID: Neisha Salazar is a 79 y.o. female.     HPI     The following portions of the patient's history were reviewed and updated as appropriate: allergies, current medications, past family history, past medical history, past social history, past surgical history and problem list.        Objective:  Patient's shoes and socks removed.   Foot ExamPhysical Exam             Physical Exam  Left Foot: Appearance: Normal except as noted: excessive supination\R\R\b0pes cavus. Tenderness: None except the great toe,\R\h3kgwmri longitudinal arch\R\R\b5iybxalokh of the plantar fascia.   Right Foot: Appearance: Normal except as noted: excessive supination\R\R\b0pes cavus. Tenderness: None except the medial longitudinal arch\R\R\s8cefvbxvel of the plantar fascia.   Left Ankle: ROM: limited ROM in all planes   Right Ankle: ROM: limited ROM in all planes   Neurological Exam: performed. Light touch was intact bilaterally. Vibratory sensation was intact bilaterally. Response to monofilament test was intact bilaterally. Deep tendon reflexes: patellar reflex present bilaterally\R\R\i1utpwjyla reflex present bilaterally.   Vascular Exam: performed Dorsalis pedis pulses were diminished bilaterally. Posterior tibial pulses were diminished bilaterally. Elevation Pallor: present bilaterally. Capillary refill time was greater than 3 seconds bilaterally\R\R\m5Lpyjw 8 findings bilateral negative digital hair noted all mycotic nails debrided today. Edema: mild bilaterally.  These are Q, 9 findings bilateral  Toenails: All of the toenails were elongated,\R\p4czphpwyifvkzy,\R\v4tlthtghwww,\R\g7tzunwjv,\R\k4viacja\R\R\s2Rjdgtsy.   Hyperkeratosis: present on both first sub metatarsals.   Shoe Gear Evaluation: Recommendation(s): custom inlays\R\R\b0SAS style.

## 2024-10-30 ENCOUNTER — TELEPHONE (OUTPATIENT)
Dept: FAMILY MEDICINE CLINIC | Facility: CLINIC | Age: 79
End: 2024-10-30

## 2024-10-31 ENCOUNTER — TELEPHONE (OUTPATIENT)
Dept: FAMILY MEDICINE CLINIC | Facility: CLINIC | Age: 79
End: 2024-10-31

## 2024-11-01 ENCOUNTER — HOSPITAL ENCOUNTER (OUTPATIENT)
Dept: RADIOLOGY | Facility: HOSPITAL | Age: 79
Discharge: HOME | End: 2024-11-01
Attending: INTERNAL MEDICINE
Payer: MEDICARE

## 2024-11-01 DIAGNOSIS — R91.8 MASS OF UPPER LOBE OF RIGHT LUNG: ICD-10-CM

## 2024-11-01 PROCEDURE — 71260 CT THORAX DX C+: CPT

## 2024-11-01 RX ADMIN — IOHEXOL 85 ML: 350 INJECTION, SOLUTION INTRAVENOUS at 09:26

## 2024-11-05 ENCOUNTER — EVALUATION (OUTPATIENT)
Dept: PHYSICAL THERAPY | Facility: CLINIC | Age: 79
End: 2024-11-05
Payer: MEDICARE

## 2024-11-05 DIAGNOSIS — S43.102A: ICD-10-CM

## 2024-11-05 PROCEDURE — 97110 THERAPEUTIC EXERCISES: CPT

## 2024-11-05 PROCEDURE — 97162 PT EVAL MOD COMPLEX 30 MIN: CPT

## 2024-11-05 NOTE — PROGRESS NOTES
PT Evaluation   Today's date: 2024  Patient name: Neisha Salazar  : 1945  MRN: [id number]  Referring provider: Sammy Pierre MD  Dx:   Encounter Diagnosis     ICD-10-CM    1. Separation of AC joint, left, initial encounter  S43.102A Ambulatory referral to Physical Therapy                Assessment  Assessment details: Patient is a 79 y.o. female who presents with referring diagnosis of Separation of AC joint, left, initial encounter. Patient's greatest concern is worry over not knowing what's wrong, concern at no signs of improvement, wanting to avoid surgery, fear of not being able to keep active, and future ill health (and wanting to prevent it).    Primary movement impairment diagnosis of mobility deficits resulting in pathoanatomical symptoms of decreased range motion, impaired physical strength, weight bearing intolerance, scapular dyskinesis, . The aforementioned impairments have limited the patient's ability to lift arm overhead, don/doff clothes, and lift weight, making activities of daily living increasingly difficult. No further referral appears necessary at this time based upon examination results    Patient education performed during today's session included: POC and HEP    Primary movement impairments:  1) mobility deficits  2) impaired physical strength    Etiological factors include:         Impairments: Abnormal or restricted ROM, Impaired physical strength, Lacks appropriate HEP, Poor posture, Poor body mechanics, Pain with function, Scapular dyskinesis, and Abnormal movement  Understanding of Dx/Px/POC: Good  Prognosis: Fair   Positive prognostic factors: motivated    Negative prognostic factors: h/o reverse TSA, comorbidities     Patient verbalized understanding of POC.         Please contact me if you have any questions or recommendations. Thank you for the referral and the opportunity to share in Neisha  Marie's care.        Plan  Plan details: 2x per week for 8 weeks    Patient would benefit from: PT Eval and Skilled PT  Planned modality interventions: Cryotherapy  Planned therapy interventions: Body mechanics training, Functional ROM exercises, HEP, Joint mobilization, Manual therapy, Mejias taping, Motor coordination training, Neuromuscular re-education, Patient education, Postural training, Strengthening, Stretching, Therapeutic activities, Therapeutic exercises, and Therapeutic training  Frequency: 2x/week  Duration in weeks: 8  Plan of Care beginning date: 11/5/24  Plan of Care expiration date: 8 weeks - 12/31/24  Treatment plan discussed with: Patient       Goals  Short Term Goals (4 weeks):    - Patient will be independent in basic HEP 2-3 weeks  - Patient will report >50% reduction in pain  - Patient will demonstrate >1/3 improvement in MMT grade as applicable  - Patient will report sleeping in bed for full night's sleep (~8 hours)  - Patient will demonstrate improved posture in shoulders when seated     Long Term Goals (8 weeks):  - Patient will be independent in a comprehensive home exercise program  - Patient FOTO score will improve to 61/100  - Patient will self-report >60% improvement in function  - Patient will report being able to independently don a bra  - Patient will be able to lift coffee cup overhead (~3 lbs)       Subjective    History of Present Illness  - Mechanism of injury: 10/21/24: Patient tripped in the dark, fell on outsreatched hand, and AC joint separation. She required assistance to stand up. She has not been able to sleep in bed, no lifting. She has been wearing arm sling for comfort and has not slept in her bed, but in a chair in fear of injuring her shoulder. States she has had major weight loss in the last few years and purchased new clothes that fit, but has trouble getting the clothes on.   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 [facility] 12/10.   - Primary AD:  none   - Assist level at home: independent   - Prior level of function: retired, driving (short distances only)       Pain  - Current pain ratin/10  - At best pain ratin/10  - At worst pain ratin/10  - Location: anterior shoulder   - Aggravating factors: lifting overhead, lifting weight, donning clothes (bra specifically)      Objective     Red Flag Screening  - No red flags present at this time      Postural Assessment  -Posture in Sitting: bilateral rounded shoulders, elevated left shoulder   -Posture in Standing: bilateral rounded shoulders, elevated left shoulder      Sensation  - Light touch: intact   - Reflexes:       Active Range of Motion      Cervical AROM 2024   Flexion minimally limited   Extension minimally limited   Protraction within normal limits   Retraction within normal limits   Side bending R minimally limited   Side bending L  minimally limited   Rotation R within normal limits   Rotation L within normal limits         Shoulder AROM LEFT RIGHT   Flexion 90 160   Abduction  60 150   ER to ear occiput   IR to gluteal fold to gluteal fold       Passive Shoulder Range of Motion in Supine     LEFT RIGHT   Flexion 120 NT   Abduction 100 NT   ER at 0 degrees 15 NT   IR at 0 degrees 45  NT         Scapular Mobility  Below 90 degrees elevation: Winging:  Minimal  Above 90 degrees elevation: Upward Rotation:  Inadequate    UE MMT     LEFT RIGHT   Shoulder Shrug  4+/5  4+/5    Shoulder Abduction  3/5  4+/5    Shoulder ER 4/5  4+/5    Shoulder IR 4/5  5/5    Elbow Flexion  4-/5  4+/5    Elbow Extension  4+/5  4+/5    Thumb Extension  5/5  5/5    Finger Adduction  5/5  5/5        Joint Play     2024   Anterior Capsule  Normal   Posterior Capsule  Hypomobile   Inferior Capsule  Hypomobile   Acromioclavicular Joint  Pain   Sternoclavicular Joint  Normal   1st Rib  Normal   CTJ Hypomobile   Mid-Thoracic Spine Hypomobile   Lower Thoracic Spine Normal       Palpation  (+) TTP of left AC joint    (-) TTP of left GHJ     Diagnostic Tests Performed  Positive: Passive Horizontal Adduction  Negative:      Functional Assessment   assessment: next visit             Insurance Eval/ Re-eval POC expires Auth Status Total visits  Start date  Expiration date Misc   medicare 11/5 12/17 No auth req  bomn                    Date 11/5        Visit Number IE         Auth                  Manual         GHJ mob          STM                                    TherEx         Pulleys          PROM         Table slides AAROM Flex, abd, er x15 ea         Cane AAROM         AROM                                    Neuro Re-Ed         Scapular retraction         Scapular depression          No monies          Scaption                                              TherAct                                                      Gait Training                                    Modalities         CP              Precautions: Rev TSA (~15 years ago)   Past Medical History:   Diagnosis Date    Anxiety disorder     Arthritis     Bright red rectal bleeding     Colon polyp     DJD (degenerative joint disease)     Hernia A year ago    High cholesterol     Hyperactivity of bladder     Hypertension     Irritable bowel syndrome Last July    Macular degeneration     Obesity     Osteoporosis     Plantar fasciitis     Not sure of date

## 2024-11-07 ENCOUNTER — OFFICE VISIT (OUTPATIENT)
Dept: PHYSICAL THERAPY | Facility: CLINIC | Age: 79
End: 2024-11-07
Payer: MEDICARE

## 2024-11-07 DIAGNOSIS — S43.102A: Primary | ICD-10-CM

## 2024-11-07 PROCEDURE — 97112 NEUROMUSCULAR REEDUCATION: CPT

## 2024-11-07 PROCEDURE — 97110 THERAPEUTIC EXERCISES: CPT

## 2024-11-07 NOTE — PROGRESS NOTES
"Daily Note     Today's date: 2024  Patient name: Neisha Salazar  : 1945  MRN: [id number]  Referring provider: Sammy Pierre MD  Dx:   Encounter Diagnosis     ICD-10-CM    1. Separation of AC joint, left, initial encounter  S43.102A                      Subjective: Patient is sore today. She states she did her her HEP 2x per day. She also reports attempting to sleep in bed, but after 2 bathroom trips in the middle of the night, she found it easier to just sleep in her chair.      Objective: See treatment diary below      Assessment: Tolerated treatment fair. Neisha Salazar ambulated with decreased balance and furniture walking. Advised her to utilize a cane or other assistive device at this time as she is healing an injury from a previous fall. Also advised pt to perform HEP 1x per day to avoid excess soreness. Reviewed HEP and provided cues for accuracy. Will progress as tolerated Patient demonstrated fatigue post treatment, exhibited good technique with therapeutic exercises, and would benefit from continued PT      Plan: Continue per plan of care.  Progress treatment as tolerated.       Insurance Eval/ Re-eval POC expires Auth Status Total visits  Start date  Expiration date Misc   medicare  No auth req  bomn                    Date        Visit Number IE  2       Auth                  Manual         GHJ mob          STM                                    TherEx         Pulleys          PROM  Performed 15 min        Table slides AAROM Flex, abd, er x15 ea  Reviewed 10x ea        Cane AAROM  Flex x10 supine     ER 2x10 seated       AROM                                    Neuro Re-Ed         Cervical retractions   2x10 seated        Scapular retraction  5\" x15        Scapular depression          No monies   YTB 2x10        Scaption                                              TherAct                                                      Gait Training                               "      Modalities         CP              Precautions: Rev TSA (~15 years ago)   Past Medical History:   Diagnosis Date    Anxiety disorder     Arthritis     Bright red rectal bleeding     Colon polyp     DJD (degenerative joint disease)     Hernia A year ago    High cholesterol     Hyperactivity of bladder     Hypertension     Irritable bowel syndrome Last July    Macular degeneration     Obesity     Osteoporosis     Plantar fasciitis     Not sure of date

## 2024-11-12 ENCOUNTER — OFFICE VISIT (OUTPATIENT)
Dept: PHYSICAL THERAPY | Facility: CLINIC | Age: 79
End: 2024-11-12
Payer: MEDICARE

## 2024-11-12 DIAGNOSIS — S43.102A: Primary | ICD-10-CM

## 2024-11-12 PROCEDURE — 97530 THERAPEUTIC ACTIVITIES: CPT

## 2024-11-12 PROCEDURE — 97112 NEUROMUSCULAR REEDUCATION: CPT

## 2024-11-12 PROCEDURE — 97110 THERAPEUTIC EXERCISES: CPT

## 2024-11-12 NOTE — PROGRESS NOTES
Daily Note     Today's date: 2024  Patient name: Neisha Salazar  : 1945  MRN: [id number]  Referring provider: Sammy Pierre MD  Dx:   Encounter Diagnosis     ICD-10-CM    1. Separation of AC joint, left, initial encounter  S43.102A                      Subjective: Patient reports mild shoulder pain. She states she has been able to lay in bed, but she reports increased nocturia (1x per hour) which has been preventing her from sleeping. She also reports fear of re-injury when laying in bed.       Objective: See treatment diary below      Assessment: Tolerated treatment fair. Educated patient on tissue healing times as well as pelvic floor therapy. She states she has an appointment with general surgeon next Monday (); advised patient to report the nocturia (especially the increase in frequency). She demo'd increased difficulty with external rotation which she performed with compensatory patterns. Instructed patient on IR iso hold at doorway which she was able to do with decreased compensation patterns. Patient demonstrated fatigue post treatment, exhibited good technique with therapeutic exercises, and would benefit from continued PT. Plan to continue pOC and progress as tolerated to promote good posture and reduce limitation secondary to pain.      Plan: Continue per plan of care.  Progress treatment as tolerated.       Insurance Eval/ Re-eval POC expires Auth Status Total visits  Start date  Expiration date Misc   medicare  No auth req  bomn                    Date       Visit Number IE  2 3      Auth                  Manual         GHJ mob          STM                                    TherEx         Pulleys     NV     PROM  Performed 15 min  Performed 8 min       Table slides AAROM Flex, abd, er x15 ea  Reviewed 10x ea  Reviewed       Cane AAROM  Flex x10 supine     ER 2x10 seated Flex 2x10     ER 2x10 supine     ABD 2x10 standing       AROM                         "            Neuro Re-Ed         Cervical retractions   2x10 seated  2x10 seated       Scapular retraction  5\" x15  5\" x15       Scapular depression          No monies   YTB 2x10  RTB 2x10       Scaption    Shoulder ER iso                                           TherAct            Pt edu: tissue healing times. Sleeping. Pelvic floor therapy x10 min                                          Gait Training                                    Modalities         CP              Precautions: Rev TSA (~15 years ago)   Past Medical History:   Diagnosis Date    Anxiety disorder     Arthritis     Bright red rectal bleeding     Colon polyp     DJD (degenerative joint disease)     Hernia A year ago    High cholesterol     Hyperactivity of bladder     Hypertension     Irritable bowel syndrome Last July    Macular degeneration     Obesity     Osteoporosis     Plantar fasciitis     Not sure of date   Access Code: [email or web address]  URL: [email or web address]  Date: 11/12/2024  Prepared by: Capri Yeung    Exercises  - Supine Shoulder Flexion Extension AAROM with Dowel  - 1 x daily - 2 sets - 10 reps - 5s hold  - Standing Shoulder Abduction AAROM with Dowel  - 1 x daily - 2 sets - 10 reps - 5s hold  - Seated Shoulder Flexion Towel Slide at Table Top  - 1 x daily - 2 sets - 10 reps - 5s hold  - Seated Shoulder Abduction Towel Slide at Table Top  - 1 x daily - 2 sets - 10 reps - 5s hold  - Seated Shoulder External Rotation PROM on Table  - 1 x daily - 2 sets - 10 reps - 5s hold  - Seated Scapular Retraction  - 1 x daily - 2 sets - 10 reps - 5s hold  - Seated Cervical Retraction  - 1 x daily - 2 sets - 10 reps  - Standing Isometric Shoulder External Rotation with Doorway  - 1 x daily - 2 sets - 10 reps - 5s hold           "

## 2024-11-14 ENCOUNTER — OFFICE VISIT (OUTPATIENT)
Dept: PHYSICAL THERAPY | Facility: CLINIC | Age: 79
End: 2024-11-14
Payer: MEDICARE

## 2024-11-14 DIAGNOSIS — S43.102D SEPARATION OF LEFT ACROMIOCLAVICULAR JOINT, SUBSEQUENT ENCOUNTER: Primary | ICD-10-CM

## 2024-11-14 PROCEDURE — 97110 THERAPEUTIC EXERCISES: CPT

## 2024-11-14 PROCEDURE — 97112 NEUROMUSCULAR REEDUCATION: CPT

## 2024-11-14 NOTE — PROGRESS NOTES
Daily Note     Today's date: 2024  Patient name: Neisha Salazar  : 1945  MRN: [id number]  Referring provider: Sammy Pierre MD  Dx:   Encounter Diagnosis     ICD-10-CM    1. Separation of left acromioclavicular joint, subsequent encounter  S43.102D           Start Time: 1415  Stop Time: 1455  Total time in clinic (min): 40 minutes    Subjective: Patient reports that her shoulder is sore today from her home exercises but is overall feeling better than last week.       Objective: See treatment diary below      Assessment: Tolerated treatment well. Patient would benefit from continued PT. Patient required cueing to maintain proper form when completing shoulder ER with cane. Patient attempted standing active shoulder flexion and exhibited compensatory shoulder shrugging, so subtitled active flexion for active assistive standing flexion with a cane.  Patient exhibited shoulder abduction while completing shoulder ER iso and required cueing for proper form. Patient reported post exercise soreness and muscle fatigue.       Plan: Continue per plan of care. Continue to progress active assist/active ROM and shoulder strengthening.        Insurance Eval/ Re-eval POC expires Auth Status Total visits  Start date  Expiration date Misc   medicare  No auth req  bomn                    Date      Visit Number IE  2 3 4     Auth                  Manual         GHJ mob          STM                                    TherEx         Pulleys     20x abd   20x flx     PROM  Performed 15 min  Performed 8 min  5 minutes     Table slides AAROM Flex, abd, er x15 ea  Reviewed 10x ea  Reviewed       Cane AAROM  Flex x10 supine     ER 2x10 seated Flex 2x10     ER 2x10 supine     ABD 2x10 standing  Flexion 2x10 5s    ER 2x10 supine 5s    ABD 2x10 5s standing     2x10 standing flx     AROM                                    Neuro Re-Ed         Cervical retractions   2x10 seated  2x10 seated  2x10  "seated     Scapular retraction  5\" x15  5\" x15  5s x20     Scapular depression          No monies   YTB 2x10  RTB 2x10  YTB  2x10 5s      Scaption    Shoulder ER iso  Shoulder ER iso with green ball 2x10 10s     Serratus punch    Iso into green ball 2x10 5s                                 TherAct            Pt edu: tissue healing times. Sleeping. Pelvic floor therapy x10 min                                          Gait Training                                    Modalities         CP              Precautions: Rev TSA (~15 years ago)   Past Medical History:   Diagnosis Date    Anxiety disorder     Arthritis     Bright red rectal bleeding     Colon polyp     DJD (degenerative joint disease)     Hernia A year ago    High cholesterol     Hyperactivity of bladder     Hypertension     Irritable bowel syndrome Last July    Macular degeneration     Obesity     Osteoporosis     Plantar fasciitis     Not sure of date   Access Code: [email or web address]  URL: [email or web address]  Date: 11/12/2024  Prepared by: Capri Yeung    Exercises  - Supine Shoulder Flexion Extension AAROM with Dowel  - 1 x daily - 2 sets - 10 reps - 5s hold  - Standing Shoulder Abduction AAROM with Dowel  - 1 x daily - 2 sets - 10 reps - 5s hold  - Seated Shoulder Flexion Towel Slide at Table Top  - 1 x daily - 2 sets - 10 reps - 5s hold  - Seated Shoulder Abduction Towel Slide at Table Top  - 1 x daily - 2 sets - 10 reps - 5s hold  - Seated Shoulder External Rotation PROM on Table  - 1 x daily - 2 sets - 10 reps - 5s hold  - Seated Scapular Retraction  - 1 x daily - 2 sets - 10 reps - 5s hold  - Seated Cervical Retraction  - 1 x daily - 2 sets - 10 reps  - Standing Isometric Shoulder External Rotation with Doorway  - 1 x daily - 2 sets - 10 reps - 5s hold             "

## 2024-11-19 ENCOUNTER — OFFICE VISIT (OUTPATIENT)
Dept: PHYSICAL THERAPY | Facility: CLINIC | Age: 79
End: 2024-11-19
Payer: MEDICARE

## 2024-11-19 DIAGNOSIS — S43.102A: ICD-10-CM

## 2024-11-19 DIAGNOSIS — S43.102D SEPARATION OF LEFT ACROMIOCLAVICULAR JOINT, SUBSEQUENT ENCOUNTER: Primary | ICD-10-CM

## 2024-11-19 PROCEDURE — 97112 NEUROMUSCULAR REEDUCATION: CPT

## 2024-11-19 PROCEDURE — 97110 THERAPEUTIC EXERCISES: CPT

## 2024-11-19 NOTE — PROGRESS NOTES
"Daily Note     Today's date: 2024  Patient name: Neisha Salazar  : 1945  MRN: [id number]  Referring provider: Sammy Pierre MD  Dx:   Encounter Diagnosis     ICD-10-CM    1. Separation of left acromioclavicular joint, subsequent encounter  S43.102D       2. Separation of AC joint, left, initial encounter  S43.102A                      Subjective: Patient notes improved ability       Objective: See treatment diary below      Assessment: Tolerated treatment well. Progressed scapular exercises with banded resistance. Included doorway ER stretch and provided HEP handout. Patient demonstrated fatigue post treatment, exhibited good technique with therapeutic exercises, and would benefit from continued PT      Plan: Continue per plan of care.  Progress treatment as tolerated.         Insurance Eval/ Re-eval POC expires Auth Status Total visits  Start date  Expiration date Misc   medicare  No auth req  bomn                    Date     Visit Number IE  2 3 4 5    Auth                  Manual         GHJ mob          STM                                    TherEx         Pulleys     20x abd   20x flx     PROM  Performed 15 min  Performed 8 min  5 minutes 8 min     Table slides AAROM Flex, abd, er x15 ea  Reviewed 10x ea  Reviewed       Cane AAROM  Flex x10 supine     ER 2x10 seated Flex 2x10     ER 2x10 supine     ABD 2x10 standing  Flexion 2x10 5s    ER 2x10 supine 5s    ABD 2x10 5s standing     2x10 standing flx All directions 2x10    AROM              Doorway ER stretch 3\" x10                      Neuro Re-Ed         Cervical retractions   2x10 seated  2x10 seated  2x10 seated 2x10    Scapular retraction  5\" x15  5\" x15  5s x20 RTB 2x10     Scapular depression      RTB 2x10    No monies   YTB 2x10  RTB 2x10  YTB  2x10 5s      Scaption    Shoulder ER iso  Shoulder ER iso with green ball 2x10 10s Shoulder ER iso with green ball 2x10 10s    Serratus punch    Iso into " green ball 2x10 5s  Iso into green ball 2x10 5s          RTB shoulder elevation 2x10                       TherAct            Pt edu: tissue healing times. Sleeping. Pelvic floor therapy x10 min                                          Gait Training                                    Modalities         CP              Precautions: Rev TSA (~15 years ago)   Past Medical History:   Diagnosis Date    Anxiety disorder     Arthritis     Bright red rectal bleeding     Colon polyp     DJD (degenerative joint disease)     Hernia A year ago    High cholesterol     Hyperactivity of bladder     Hypertension     Irritable bowel syndrome Last July    Macular degeneration     Obesity     Osteoporosis     Plantar fasciitis     Not sure of date   Access Code: [email or web address]  URL: [email or web address]  Date: 11/12/2024  Prepared by: Capri Yeung    Exercises  - Supine Shoulder Flexion Extension AAROM with Dowel  - 1 x daily - 2 sets - 10 reps - 5s hold  - Standing Shoulder Abduction AAROM with Dowel  - 1 x daily - 2 sets - 10 reps - 5s hold  - Seated Shoulder Flexion Towel Slide at Table Top  - 1 x daily - 2 sets - 10 reps - 5s hold  - Seated Shoulder Abduction Towel Slide at Table Top  - 1 x daily - 2 sets - 10 reps - 5s hold  - Seated Shoulder External Rotation PROM on Table  - 1 x daily - 2 sets - 10 reps - 5s hold  - Seated Scapular Retraction  - 1 x daily - 2 sets - 10 reps - 5s hold  - Seated Cervical Retraction  - 1 x daily - 2 sets - 10 reps  - Standing Isometric Shoulder External Rotation with Doorway  - 1 x daily - 2 sets - 10 reps - 5s hold

## 2024-11-21 ENCOUNTER — OFFICE VISIT (OUTPATIENT)
Dept: PHYSICAL THERAPY | Facility: CLINIC | Age: 79
End: 2024-11-21
Payer: MEDICARE

## 2024-11-21 DIAGNOSIS — S43.102D SEPARATION OF LEFT ACROMIOCLAVICULAR JOINT, SUBSEQUENT ENCOUNTER: Primary | ICD-10-CM

## 2024-11-21 DIAGNOSIS — S43.102A: ICD-10-CM

## 2024-11-21 PROCEDURE — 97110 THERAPEUTIC EXERCISES: CPT

## 2024-11-21 PROCEDURE — 97530 THERAPEUTIC ACTIVITIES: CPT

## 2024-11-21 PROCEDURE — 97112 NEUROMUSCULAR REEDUCATION: CPT

## 2024-11-21 NOTE — PROGRESS NOTES
"Daily Note     Today's date: 2024  Patient name: Neisha Salazar  : 1945  MRN: [id number]  Referring provider: Sammy Pierre MD  Dx:   Encounter Diagnosis     ICD-10-CM    1. Separation of left acromioclavicular joint, subsequent encounter  S43.102D       2. Separation of AC joint, left, initial encounter  S43.102A                      Subjective: Patient is a little sore which she attributes to the weather       Objective: See treatment diary below  Vitals (14:05)   /68  HR 85   spO2 97%    Assessment: performed FOTO follow up she score 62 (predicted score 61 at visit 10) indiciating positive response to therapy and positive trajectory towards goals established at IE. She is now able actively lift her arm overhead without pain. She continues to demonstrate impaired scapulohumeral rhythm with delayed scapular upward rotation with shoulder elevation. No adverse effects noted with treatment session. She would benefit from continued PT to address scapulohumeral rhythm and maximize function in left shoulder.       Plan: Continue per plan of care.  Progress treatment as tolerated.         Insurance Eval/ Re-eval POC expires Auth Status Total visits  Start date  Expiration date Misc   medicare  No auth req  bomn                    Date    Visit Number IE  2 3 4 5 6 - FOTO    Auth                  Manual         GHJ mob          STM                                    TherEx         Pulleys     20x abd   20x flx     PROM  Performed 15 min  Performed 8 min  5 minutes 8 min  8 min    Table slides AAROM Flex, abd, er x15 ea  Reviewed 10x ea  Reviewed       Cane AAROM  Flex x10 supine     ER 2x10 seated Flex 2x10     ER 2x10 supine     ABD 2x10 standing  Flexion 2x10 5s    ER 2x10 supine 5s    ABD 2x10 5s standing     2x10 standing flx All directions 2x10 Shoulder extension 2x10     Shoulder IR 2x10    AROM              Doorway ER stretch 3\" x10                " "      Neuro Re-Ed         Cervical retractions   2x10 seated  2x10 seated  2x10 seated 2x10 3x10 seated    Scapular retraction  5\" x15  5\" x15  5s x20 RTB 2x10  RTB 2x10   Scapular depression      RTB 2x10 RTB 2x10   No monies   YTB 2x10  RTB 2x10  YTB  2x10 5s   RTB 2x10    Scaption    Shoulder ER iso  Shoulder ER iso with green ball 2x10 10s Shoulder ER iso with green ball 2x10 10s Shoulder ER iso with green ball 2x10 10s   Serratus punch    Iso into green ball 2x10 5s  Iso into green ball 2x10 5s  Iso into green ball 2x10 5s        RTB shoulder elevation 2x10  RTB shoulder elevation 2x10                     TherAct            Pt edu: tissue healing times. Sleeping. Pelvic floor therapy x10 min   FOTO                                        Gait Training                                    Modalities         CP              Precautions: Rev TSA (~15 years ago)   Past Medical History:   Diagnosis Date    Anxiety disorder     Arthritis     Bright red rectal bleeding     Colon polyp     DJD (degenerative joint disease)     Hernia A year ago    High cholesterol     Hyperactivity of bladder     Hypertension     Irritable bowel syndrome Last July    Macular degeneration     Obesity     Osteoporosis     Plantar fasciitis     Not sure of date   Access Code: [email or web address]  URL: [email or web address]  Date: 11/12/2024  Prepared by: Capri Yeung    Exercises  - Supine Shoulder Flexion Extension AAROM with Dowel  - 1 x daily - 2 sets - 10 reps - 5s hold  - Standing Shoulder Abduction AAROM with Dowel  - 1 x daily - 2 sets - 10 reps - 5s hold  - Seated Shoulder Flexion Towel Slide at Table Top  - 1 x daily - 2 sets - 10 reps - 5s hold  - Seated Shoulder Abduction Towel Slide at Table Top  - 1 x daily - 2 sets - 10 reps - 5s hold  - Seated Shoulder External Rotation PROM on Table  - 1 x daily - 2 sets - 10 reps - 5s hold  - Seated Scapular Retraction  - 1 x daily - 2 sets - 10 reps - 5s hold  - Seated Cervical Retraction  - " 1 x daily - 2 sets - 10 reps  - Standing Isometric Shoulder External Rotation with Doorway  - 1 x daily - 2 sets - 10 reps - 5s hold

## 2024-11-25 ENCOUNTER — OFFICE VISIT (OUTPATIENT)
Dept: FAMILY MEDICINE CLINIC | Facility: CLINIC | Age: 79
End: 2024-11-25
Payer: MEDICARE

## 2024-11-25 VITALS
BODY MASS INDEX: 32.42 KG/M2 | OXYGEN SATURATION: 96 % | WEIGHT: 176.2 LBS | HEART RATE: 80 BPM | HEIGHT: 62 IN | DIASTOLIC BLOOD PRESSURE: 74 MMHG | SYSTOLIC BLOOD PRESSURE: 130 MMHG

## 2024-11-25 DIAGNOSIS — E78.5 DYSLIPIDEMIA: ICD-10-CM

## 2024-11-25 DIAGNOSIS — Z00.00 MEDICARE ANNUAL WELLNESS VISIT, SUBSEQUENT: ICD-10-CM

## 2024-11-25 DIAGNOSIS — R21 RASH: ICD-10-CM

## 2024-11-25 DIAGNOSIS — D72.819 LEUKOPENIA, UNSPECIFIED TYPE: ICD-10-CM

## 2024-11-25 DIAGNOSIS — F32.A ANXIETY AND DEPRESSION: ICD-10-CM

## 2024-11-25 DIAGNOSIS — M81.0 AGE-RELATED OSTEOPOROSIS WITHOUT CURRENT PATHOLOGICAL FRACTURE: ICD-10-CM

## 2024-11-25 DIAGNOSIS — M15.0 PRIMARY OSTEOARTHRITIS INVOLVING MULTIPLE JOINTS: ICD-10-CM

## 2024-11-25 DIAGNOSIS — F41.9 ANXIETY AND DEPRESSION: ICD-10-CM

## 2024-11-25 DIAGNOSIS — E66.09 CLASS 1 OBESITY DUE TO EXCESS CALORIES WITH SERIOUS COMORBIDITY AND BODY MASS INDEX (BMI) OF 33.0 TO 33.9 IN ADULT: ICD-10-CM

## 2024-11-25 DIAGNOSIS — E66.811 CLASS 1 OBESITY DUE TO EXCESS CALORIES WITH SERIOUS COMORBIDITY AND BODY MASS INDEX (BMI) OF 33.0 TO 33.9 IN ADULT: ICD-10-CM

## 2024-11-25 DIAGNOSIS — E53.8 B12 DEFICIENCY: Primary | ICD-10-CM

## 2024-11-25 DIAGNOSIS — I10 PRIMARY HYPERTENSION: ICD-10-CM

## 2024-11-25 DIAGNOSIS — R73.03 PREDIABETES: ICD-10-CM

## 2024-11-25 DIAGNOSIS — Z13.0 SCREENING FOR DEFICIENCY ANEMIA: ICD-10-CM

## 2024-11-25 DIAGNOSIS — K64.8 OTHER HEMORRHOIDS: ICD-10-CM

## 2024-11-25 PROCEDURE — 99214 OFFICE O/P EST MOD 30 MIN: CPT | Performed by: INTERNAL MEDICINE

## 2024-11-25 PROCEDURE — G0439 PPPS, SUBSEQ VISIT: HCPCS | Performed by: INTERNAL MEDICINE

## 2024-11-25 RX ORDER — HYDROCORTISONE 25 MG/G
CREAM TOPICAL 2 TIMES DAILY PRN
Qty: 28 G | Refills: 2 | Status: SHIPPED | OUTPATIENT
Start: 2024-11-25

## 2024-11-25 NOTE — ASSESSMENT & PLAN NOTE
Blood pressure today is 130/74 currently not on any medication she was on once before but had to be discontinued because the pressures were dropping will monitor for now.

## 2024-11-25 NOTE — PATIENT INSTRUCTIONS
Medicare Preventive Visit Patient Instructions  Thank you for completing your Welcome to Medicare Visit or Medicare Annual Wellness Visit today. Your next wellness visit will be due in one year (11/26/2025).  The screening/preventive services that you may require over the next 5-10 years are detailed below. Some tests may not apply to you based off risk factors and/or age. Screening tests ordered at today's visit but not completed yet may show as past due. Also, please note that scanned in results may not display below.  Preventive Screenings:  Service Recommendations Previous Testing/Comments   Colorectal Cancer Screening  * Colonoscopy    * Fecal Occult Blood Test (FOBT)/Fecal Immunochemical Test (FIT)  * Fecal DNA/Cologuard Test  * Flexible Sigmoidoscopy Age: 45-75 years old   Colonoscopy: every 10 years (may be performed more frequently if at higher risk)  OR  FOBT/FIT: every 1 year  OR  Cologuard: every 3 years  OR  Sigmoidoscopy: every 5 years  Screening may be recommended earlier than age 45 if at higher risk for colorectal cancer. Also, an individualized decision between you and your healthcare provider will decide whether screening between the ages of 76-85 would be appropriate. Colonoscopy: 08/22/2023  FOBT/FIT: Not on file  Cologuard: Not on file  Sigmoidoscopy: Not on file          Breast Cancer Screening Age: 40+ years old  Frequency: every 1-2 years  Not required if history of left and right mastectomy Mammogram: 05/29/2024        Cervical Cancer Screening Between the ages of 21-29, pap smear recommended once every 3 years.   Between the ages of 30-65, can perform pap smear with HPV co-testing every 5 years.   Recommendations may differ for women with a history of total hysterectomy, cervical cancer, or abnormal pap smears in past. Pap Smear: Not on file        Hepatitis C Screening Once for adults born between 1945 and 1965  More frequently in patients at high risk for Hepatitis C Hep C Antibody: Not  on file        Diabetes Screening 1-2 times per year if you're at risk for diabetes or have pre-diabetes Fasting glucose: 103 mg/dL (10/26/2024)  A1C: 5.7 % (10/26/2024)      Cholesterol Screening Once every 5 years if you don't have a lipid disorder. May order more often based on risk factors. Lipid panel: 10/26/2024          Other Preventive Screenings Covered by Medicare:  Abdominal Aortic Aneurysm (AAA) Screening: covered once if your at risk. You're considered to be at risk if you have a family history of AAA.  Lung Cancer Screening: covers low dose CT scan once per year if you meet all of the following conditions: (1) Age 55-77; (2) No signs or symptoms of lung cancer; (3) Current smoker or have quit smoking within the last 15 years; (4) You have a tobacco smoking history of at least 20 pack years (packs per day multiplied by number of years you smoked); (5) You get a written order from a healthcare provider.  Glaucoma Screening: covered annually if you're considered high risk: (1) You have diabetes OR (2) Family history of glaucoma OR (3)  aged 50 and older OR (4)  American aged 65 and older  Osteoporosis Screening: covered every 2 years if you meet one of the following conditions: (1) You're estrogen deficient and at risk for osteoporosis based off medical history and other findings; (2) Have a vertebral abnormality; (3) On glucocorticoid therapy for more than 3 months; (4) Have primary hyperparathyroidism; (5) On osteoporosis medications and need to assess response to drug therapy.   Last bone density test (DXA Scan): 12/15/2023.  HIV Screening: covered annually if you're between the age of 15-65. Also covered annually if you are younger than 15 and older than 65 with risk factors for HIV infection. For pregnant patients, it is covered up to 3 times per pregnancy.    Immunizations:  Immunization Recommendations   Influenza Vaccine Annual influenza vaccination during flu season is  recommended for all persons aged >= 6 months who do not have contraindications   Pneumococcal Vaccine   * Pneumococcal conjugate vaccine = PCV13 (Prevnar 13), PCV15 (Vaxneuvance), PCV20 (Prevnar 20)  * Pneumococcal polysaccharide vaccine = PPSV23 (Pneumovax) Adults 19-63 yo with certain risk factors or if 65+ yo  If never received any pneumonia vaccine: recommend Prevnar 20 (PCV20)  Give PCV20 if previously received 1 dose of PCV13 or PPSV23   Hepatitis B Vaccine 3 dose series if at intermediate or high risk (ex: diabetes, end stage renal disease, liver disease)   Respiratory syncytial virus (RSV) Vaccine - COVERED BY MEDICARE PART D  * RSVPreF3 (Arexvy) CDC recommends that adults 60 years of age and older may receive a single dose of RSV vaccine using shared clinical decision-making (SCDM)   Tetanus (Td) Vaccine - COST NOT COVERED BY MEDICARE PART B Following completion of primary series, a booster dose should be given every 10 years to maintain immunity against tetanus. Td may also be given as tetanus wound prophylaxis.   Tdap Vaccine - COST NOT COVERED BY MEDICARE PART B Recommended at least once for all adults. For pregnant patients, recommended with each pregnancy.   Shingles Vaccine (Shingrix) - COST NOT COVERED BY MEDICARE PART B  2 shot series recommended in those 19 years and older who have or will have weakened immune systems or those 50 years and older     Health Maintenance Due:      Topic Date Due   • Hepatitis C Screening  Never done   • Colorectal Cancer Screening  08/21/2026     Immunizations Due:      Topic Date Due   • Pneumococcal Vaccine: 65+ Years (1 of 1 - PCV) Never done     Advance Directives   What are advance directives?  Advance directives are legal documents that state your wishes and plans for medical care. These plans are made ahead of time in case you lose your ability to make decisions for yourself. Advance directives can apply to any medical decision, such as the treatments you want,  and if you want to donate organs.   What are the types of advance directives?  There are many types of advance directives, and each state has rules about how to use them. You may choose a combination of any of the following:  Living will:  This is a written record of the treatment you want. You can also choose which treatments you do not want, which to limit, and which to stop at a certain time. This includes surgery, medicine, IV fluid, and tube feedings.   Durable power of  for healthcare (DPAHC):  This is a written record that states who you want to make healthcare choices for you when you are unable to make them for yourself. This person, called a proxy, is usually a family member or a friend. You may choose more than 1 proxy.  Do not resuscitate (DNR) order:  A DNR order is used in case your heart stops beating or you stop breathing. It is a request not to have certain forms of treatment, such as CPR. A DNR order may be included in other types of advance directives.  Medical directive:  This covers the care that you want if you are in a coma, near death, or unable to make decisions for yourself. You can list the treatments you want for each condition. Treatment may include pain medicine, surgery, blood transfusions, dialysis, IV or tube feedings, and a ventilator (breathing machine).  Values history:  This document has questions about your views, beliefs, and how you feel and think about life. This information can help others choose the care that you would choose.  Why are advance directives important?  An advance directive helps you control your care. Although spoken wishes may be used, it is better to have your wishes written down. Spoken wishes can be misunderstood, or not followed. Treatments may be given even if you do not want them. An advance directive may make it easier for your family to make difficult choices about your care.   Weight Management   Why it is important to manage your weight:   Being overweight increases your risk of health conditions such as heart disease, high blood pressure, type 2 diabetes, and certain types of cancer. It can also increase your risk for osteoarthritis, sleep apnea, and other respiratory problems. Aim for a slow, steady weight loss. Even a small amount of weight loss can lower your risk of health problems.  How to lose weight safely:  A safe and healthy way to lose weight is to eat fewer calories and get regular exercise. You can lose up about 1 pound a week by decreasing the number of calories you eat by 500 calories each day.   Healthy meal plan for weight management:  A healthy meal plan includes a variety of foods, contains fewer calories, and helps you stay healthy. A healthy meal plan includes the following:  Eat whole-grain foods more often.  A healthy meal plan should contain fiber. Fiber is the part of grains, fruits, and vegetables that is not broken down by your body. Whole-grain foods are healthy and provide extra fiber in your diet. Some examples of whole-grain foods are whole-wheat breads and pastas, oatmeal, brown rice, and bulgur.  Eat a variety of vegetables every day.  Include dark, leafy greens such as spinach, kale, bk greens, and mustard greens. Eat yellow and orange vegetables such as carrots, sweet potatoes, and winter squash.   Eat a variety of fruits every day.  Choose fresh or canned fruit (canned in its own juice or light syrup) instead of juice. Fruit juice has very little or no fiber.  Eat low-fat dairy foods.  Drink fat-free (skim) milk or 1% milk. Eat fat-free yogurt and low-fat cottage cheese. Try low-fat cheeses such as mozzarella and other reduced-fat cheeses.  Choose meat and other protein foods that are low in fat.  Choose beans or other legumes such as split peas or lentils. Choose fish, skinless poultry (chicken or turkey), or lean cuts of red meat (beef or pork). Before you cook meat or poultry, cut off any visible fat.   Use  less fat and oil.  Try baking foods instead of frying them. Add less fat, such as margarine, sour cream, regular salad dressing and mayonnaise to foods. Eat fewer high-fat foods. Some examples of high-fat foods include french fries, doughnuts, ice cream, and cakes.  Eat fewer sweets.  Limit foods and drinks that are high in sugar. This includes candy, cookies, regular soda, and sweetened drinks.  Exercise:  Exercise at least 30 minutes per day on most days of the week. Some examples of exercise include walking, biking, dancing, and swimming. You can also fit in more physical activity by taking the stairs instead of the elevator or parking farther away from stores. Ask your healthcare provider about the best exercise plan for you.      © Copyright [employer] 2018 Information is for End User's use only and may not be sold, redistributed or otherwise used for commercial purposes. All illustrations and images included in CareNotes® are the copyrighted property of [employer] or [employer]

## 2024-11-25 NOTE — ASSESSMENT & PLAN NOTE
Patient has a history of B12 deficiency currently taking vitamin B12 1000 mcg daily will continue with the same.  Blood test for B12 level came back normal at 386

## 2024-11-25 NOTE — ASSESSMENT & PLAN NOTE
Patient with a history of osteoporosis could not tolerate the medication Fosamax she is afraid to go on the medication Prolia every 6 months.  Currently she is taking calcium but has been on compliant recommend patient to take her every day 1200 mg of calcium and 2000 units of vitamin D.

## 2024-11-25 NOTE — PROGRESS NOTES
Name: Neisha Salazar      : 1945      MRN: [id number]  Encounter Provider: Tia Warren MD  Encounter Date: 2024   Encounter department: [facility] PRIMARY [facility] [place]    Assessment & Plan  B12 deficiency  Patient has a history of B12 deficiency currently taking vitamin B12 1000 mcg daily will continue with the same.  Blood test for B12 level came back normal at 386         Age-related osteoporosis without current pathological fracture  Patient with a history of osteoporosis could not tolerate the medication Fosamax she is afraid to go on the medication Prolia every 6 months.  Currently she is taking calcium but has been on compliant recommend patient to take her every day 1200 mg of calcium and 2000 units of vitamin D.         Other hemorrhoids    Orders:    hydrocortisone (ANUSOL-HC) 2.5 % rectal cream; Apply topically 2 (two) times a day as needed for hemorrhoids    Leukopenia, unspecified type  Labs shows her white cell count is at 3.93 it was 5.49 differential count is normal absolute neutrophils slightly low at 1.84 we will follow-up with repeat CBC prior to the next office visit.         Anxiety and depression  Patient takes occasionally Xanax.         Class 1 obesity due to excess calories with serious comorbidity and body mass index (BMI) of 33.0 to 33.9 in adult    Patient with BMI of 32.23 emphasized regarding diet exercise lifestyle modification and lose weight.         Screening for deficiency anemia    Orders:    CBC and differential; Future    Dyslipidemia  Diet controlled lipid profile shows cholesterol at 185 triglyceride 81 HDL is 54 LDL is 115       Prediabetes  Hemoglobin A1c is at 5.7 emphasized again regarding diet exercise lifestyle modification A1c was 5.8 before       Primary hypertension  Blood pressure is 130/74 she was on antihypertensive medications in the past but her pressures were running on the lower side we stopped it now it is stabilized without medication with  diet alone she is able to control her blood pressure and today it is 130/74       Primary osteoarthritis involving multiple joints  Recommend patient take Tylenol as needed       Rash         Medicare annual wellness visit, subsequent           Depression Screening and Follow-up Plan: Patient was screened for depression during today's encounter. They screened negative with a PHQ-9 score of 3.      Preventive health issues were discussed with patient, and age appropriate screening tests were ordered as noted in patient's After Visit Summary. Personalized health advice and appropriate referrals for health education or preventive services given if needed, as noted in patient's After Visit Summary.    History of Present Illness     Patient has coming for a follow-up evaluation with regards to symptoms of her osteoporosis, dyslipidemia, history of hypertension but blood pressures have been stable without medication.  She has been also under a lot of stress in the recent past.  Patient also has got history of B12 deficiency currently taking 1000 mcg of B12 daily.  Medications reviewed labs reviewed.  White cell count has come back slightly on the lower side.  Otherwise rest of the labs are unremarkable.       Patient Care Team:  Tia Warren MD as PCP - General (Internal Medicine)  Tia Warren MD    Review of Systems   Constitutional:  Negative for chills and fever.   HENT:  Negative for ear pain and sore throat.    Eyes:  Negative for pain and visual disturbance.   Respiratory:  Negative for cough and shortness of breath.    Cardiovascular:  Negative for chest pain and palpitations.   Gastrointestinal:  Negative for abdominal pain and vomiting.   Genitourinary:  Negative for dysuria and hematuria.   Musculoskeletal:  Negative for arthralgias and back pain.   Skin:  Negative for color change and rash.   Neurological:  Negative for seizures and syncope.   All other systems reviewed and are negative.    Medical  History Reviewed by provider this encounter:       Annual Wellness Visit Questionnaire   Neisha is here for her Subsequent Wellness visit. Last Medicare Wellness visit information reviewed, patient interviewed, no change since last AWV. Last Medicare Wellness visit information reviewed, patient interviewed and updates made to the record today.      Health Risk Assessment:   Patient rates overall health as fair. Patient feels that their physical health rating is same. Patient is satisfied with their life. Eyesight was rated as slightly worse. Hearing was rated as slightly worse. Patient feels that their emotional and mental health rating is same. Patients states they are sometimes angry. Patient states they are often unusually tired/fatigued. Pain experienced in the last 7 days has been none. Patient states that she has experienced no weight loss or gain in last 6 months. Satisfied with life    Depression Screening:   PHQ-9 Score: 3      Fall Risk Screening:   In the past year, patient has experienced: no history of falling in past year      Urinary Incontinence Screening:   Patient has leaked urine accidently in the last six months. Occasionally    Home Safety:  Patient does not have trouble with stairs inside or outside of their home. Patient has working smoke alarms and has working carbon monoxide detector. Home safety hazards include: none. Home is very safe    Nutrition:   Current diet is Regular. Cooks for every one    Medications:   Patient is not currently taking any over-the-counter supplements. Patient is able to manage medications.     Activities of Daily Living (ADLs)/Instrumental Activities of Daily Living (IADLs):   Walk and transfer into and out of bed and chair?: Yes  Dress and groom yourself?: Yes    Bathe or shower yourself?: Yes    Feed yourself? Yes  Do your laundry/housekeeping?: Yes  Manage your money, pay your bills and track your expenses?: Yes  Make your own meals?: Yes    Do your own  shopping?: Yes    Previous Hospitalizations:   Any hospitalizations or ED visits within the last 12 months?: No      Advance Care Planning:   Living will: No    Durable POA for healthcare: No    Advanced directive: No    Advanced directive counseling given: Yes    ACP document given: Yes    Patient declined ACP directive: No    End of Life Decisions reviewed with patient: Yes    Provider agrees with end of life decisions: Yes      Comments: Discussed with patient regarding end-of-life care decisions and I have given her the papers regarding advance care planning she is going to review with her family and bring back the papers.    Cognitive Screening:   Provider or family/friend/caregiver concerned regarding cognition?: No    PREVENTIVE SCREENINGS      Cardiovascular Screening:    General: Screening Current      Diabetes Screening:     General: Screening Current      Colorectal Cancer Screening:     General: Screening Current      Breast Cancer Screening:     General: Screening Current      Cervical Cancer Screening:    General: Screening Not Indicated      Osteoporosis Screening:    General: Screening Not Indicated and History Osteoporosis    Due for: Bone Density Ultrasound      Abdominal Aortic Aneurysm (AAA) Screening:        General: Screening Not Indicated      Lung Cancer Screening:     General: Screening Not Indicated      Hepatitis C Screening:    General: Screening Not Indicated    Screening, Brief Intervention, and Referral to Treatment (SBIRT)    Screening  Typical number of drinks in a day: 0  Typical number of drinks in a week: 0  Interpretation: Low risk drinking behavior.    AUDIT-C Screenin) How often did you have a drink containing alcohol in the past year? never  2) How many drinks did you have on a typical day when you were drinking in the past year? 0  3) How often did you have 6 or more drinks on one occasion in the past year? never    AUDIT-C Score: 0  Interpretation: Score 0-2 (female):  "Negative screen for alcohol misuse    Brief Intervention  Alcohol & drug use screenings were reviewed. No concerns regarding substance use disorder identified.     Other Counseling Topics:   Car/seat belt/driving safety and calcium and vitamin D intake and regular weightbearing exercise.     Social Drivers of Health     Financial Resource Strain: Low Risk  (10/10/2023)    Overall Financial Resource Strain (CARDIA)     Difficulty of Paying Living Expenses: Not very hard   Food Insecurity: No Food Insecurity (11/25/2024)    Hunger Vital Sign     Worried About Running Out of Food in the Last Year: Never true     Ran Out of Food in the Last Year: Never true   Transportation Needs: No Transportation Needs (11/25/2024)    PRAPARE - Transportation     Lack of Transportation (Medical): No     Lack of Transportation (Non-Medical): No   Housing Stability: Low Risk  (11/25/2024)    Housing Stability Vital Sign     Unable to Pay for Housing in the Last Year: No     Number of Times Moved in the Last Year: 1     Homeless in the Last Year: No   Utilities: Not At Risk (11/25/2024)    [facility] Utilities     Threatened with loss of utilities: No     No results found.    Objective   /74 (BP Location: Right arm, Patient Position: Sitting, Cuff Size: Standard)   Pulse 80   Ht 5' 2\" (1.575 m)   Wt 79.9 kg (176 lb 3.2 oz)   SpO2 96%   BMI 32.23 kg/m²     Physical Exam  Vitals and nursing note reviewed.   Constitutional:       General: She is not in acute distress.     Appearance: She is well-developed. She is obese.   HENT:      Head: Normocephalic and atraumatic.   Eyes:      Conjunctiva/sclera: Conjunctivae normal.   Cardiovascular:      Rate and Rhythm: Normal rate and regular rhythm.      Heart sounds: No murmur heard.  Pulmonary:      Effort: Pulmonary effort is normal. No respiratory distress.      Breath sounds: Normal breath sounds.   Abdominal:      Palpations: Abdomen is soft.      Tenderness: There is no abdominal " tenderness.   Musculoskeletal:         General: No swelling.      Cervical back: Neck supple.   Skin:     General: Skin is warm and dry.      Capillary Refill: Capillary refill takes less than 2 seconds.   Neurological:      Mental Status: She is alert.   Psychiatric:         Mood and Affect: Mood normal.       Recent Results (from the past 8 weeks)   CBC and differential    Collection Time: 10/26/24  9:18 AM   Result Value Ref Range    WBC 3.93 (L) 4.31 - 10.16 Thousand/uL    RBC 4.06 3.81 - 5.12 Million/uL    Hemoglobin 12.1 11.5 - 15.4 g/dL    Hematocrit 37.6 34.8 - 46.1 %    MCV 93 82 - 98 fL    MCH 29.8 26.8 - 34.3 pg    MCHC 32.2 31.4 - 37.4 g/dL    RDW 14.3 11.6 - 15.1 %    MPV 9.8 8.9 - 12.7 fL    Platelets 263 149 - 390 Thousands/uL    nRBC 0 /100 WBCs    Segmented % 47 43 - 75 %    Immature Grans % 0 0 - 2 %    Lymphocytes % 34 14 - 44 %    Monocytes % 13 (H) 4 - 12 %    Eosinophils Relative 5 0 - 6 %    Basophils Relative 1 0 - 1 %    Absolute Neutrophils 1.84 (L) 1.85 - 7.62 Thousands/µL    Absolute Immature Grans 0.01 0.00 - 0.20 Thousand/uL    Absolute Lymphocytes 1.32 0.60 - 4.47 Thousands/µL    Absolute Monocytes 0.52 0.17 - 1.22 Thousand/µL    Eosinophils Absolute 0.20 0.00 - 0.61 Thousand/µL    Basophils Absolute 0.04 0.00 - 0.10 Thousands/µL   Comprehensive metabolic panel    Collection Time: 10/26/24  9:18 AM   Result Value Ref Range    Sodium 138 135 - 147 mmol/L    Potassium 4.0 3.5 - 5.3 mmol/L    Chloride 106 96 - 108 mmol/L    CO2 26 21 - 32 mmol/L    ANION GAP 6 4 - 13 mmol/L    BUN 11 5 - 25 mg/dL    Creatinine 0.68 0.60 - 1.30 mg/dL    Glucose, Fasting 103 (H) 65 - 99 mg/dL    Calcium 8.6 8.4 - 10.2 mg/dL    AST 9 (L) 13 - 39 U/L    ALT 11 7 - 52 U/L    Alkaline Phosphatase 68 34 - 104 U/L    Total Protein 6.3 (L) 6.4 - 8.4 g/dL    Albumin 3.5 3.5 - 5.0 g/dL    Total Bilirubin 0.42 0.20 - 1.00 mg/dL    eGFR 84 ml/min/1.73sq m   Hemoglobin A1C    Collection Time: 10/26/24  9:18 AM   Result  Value Ref Range    Hemoglobin A1C 5.7 (H) Normal 4.0-5.6%; PreDiabetic 5.7-6.4%; Diabetic >=6.5%; Glycemic control for adults with diabetes <7.0% %     mg/dl   Lipid panel    Collection Time: 10/26/24  9:18 AM   Result Value Ref Range    Cholesterol 185 See Comment mg/dL    Triglycerides 81 See Comment mg/dL    HDL, Direct 54 >=50 mg/dL    LDL Calculated 115 (H) 0 - 100 mg/dL    Non-HDL-Chol (CHOL-HDL) 131 mg/dl   TSH, 3rd generation with Free T4 reflex    Collection Time: 10/26/24  9:18 AM   Result Value Ref Range    TSH 3RD GENERATON 2.185 0.450 - 4.500 uIU/mL   UA w Reflex to Microscopic w Reflex to Culture    Collection Time: 10/26/24  9:18 AM    Specimen: Urine, Clean Catch   Result Value Ref Range    Color, UA Light Yellow     Clarity, UA Slightly Cloudy     Specific Gravity, UA 1.020 1.005 - 1.030    pH, UA 7.0 4.5, 5.0, 5.5, 6.0, 6.5, 7.0, 7.5, 8.0    Leukocytes, UA Negative Negative    Nitrite, UA Negative Negative    Protein, UA Negative Negative mg/dl    Glucose, UA Negative Negative mg/dl    Ketones, UA Negative Negative mg/dl    Urobilinogen, UA <2.0 <2.0 mg/dl mg/dl    Bilirubin, UA Negative Negative    Occult Blood, UA Negative Negative   Vitamin B12    Collection Time: 10/26/24  9:18 AM   Result Value Ref Range    Vitamin B-12 386 180 - 914 pg/mL   Vitamin D 25 hydroxy    Collection Time: 10/26/24  9:18 AM   Result Value Ref Range    Vit D, 25-Hydroxy 69.3 30.0 - 100.0 ng/mL

## 2024-11-25 NOTE — ASSESSMENT & PLAN NOTE
Blood pressure is 130/74 she was on antihypertensive medications in the past but her pressures were running on the lower side we stopped it now it is stabilized without medication with diet alone she is able to control her blood pressure and today it is 130/74

## 2024-11-25 NOTE — ASSESSMENT & PLAN NOTE
Orders:    hydrocortisone (ANUSOL-HC) 2.5 % rectal cream; Apply topically 2 (two) times a day as needed for hemorrhoids

## 2024-11-25 NOTE — ASSESSMENT & PLAN NOTE
Hemoglobin A1c is at 5.7 emphasized again regarding diet exercise lifestyle modification A1c was 5.8 before

## 2024-11-25 NOTE — ASSESSMENT & PLAN NOTE
Patient with BMI of 32.23 emphasized regarding diet exercise lifestyle modification and lose weight.

## 2024-11-25 NOTE — ASSESSMENT & PLAN NOTE
A1c came back at 5.7 emphasized regarding diet exercise lifestyle modification.  A1c was 5.8 before

## 2024-11-25 NOTE — ASSESSMENT & PLAN NOTE
Diet controlled lipid profile shows cholesterol at 185 triglyceride 81 HDL is 54 LDL is 115        See scanned session report.

## 2024-11-25 NOTE — ASSESSMENT & PLAN NOTE
Labs shows her white cell count is at 3.93 it was 5.49 differential count is normal absolute neutrophils slightly low at 1.84 we will follow-up with repeat CBC prior to the next office visit.

## 2024-11-25 NOTE — ASSESSMENT & PLAN NOTE
Patient has problems with hemorrhoids on and off we will renew her Anusol HC cream which appears to be helping.  Recommend patient to keep the stools soft and avoid constipation.

## 2024-11-26 ENCOUNTER — OFFICE VISIT (OUTPATIENT)
Dept: PHYSICAL THERAPY | Facility: CLINIC | Age: 79
End: 2024-11-26
Payer: MEDICARE

## 2024-11-26 DIAGNOSIS — S43.102D SEPARATION OF LEFT ACROMIOCLAVICULAR JOINT, SUBSEQUENT ENCOUNTER: Primary | ICD-10-CM

## 2024-11-26 DIAGNOSIS — S43.102A: ICD-10-CM

## 2024-11-26 PROCEDURE — 97110 THERAPEUTIC EXERCISES: CPT

## 2024-11-26 PROCEDURE — 97112 NEUROMUSCULAR REEDUCATION: CPT

## 2024-11-26 NOTE — PROGRESS NOTES
"Daily Note     Today's date: 2024  Patient name: Neisha Salazar  : 1945  MRN: [id number]  Referring provider: Sammy Pierre MD  Dx: No diagnosis found.               Subjective: Patient states bilateral shoulder pain today.       Objective: See treatment diary below      Assessment: Tolerated treatment well. She reported repeated cervical retractions no longer produced change in symptoms. Progressed chin tucks to add cervical extension which she responded to well. Plan to progress as tolerated. Patient demonstrated fatigue post treatment, exhibited good technique with therapeutic exercises, and would benefit from continued PT      Plan: Continue per plan of care.  Progress treatment as tolerated.         Insurance Eval/ Re-eval POC expires Auth Status Total visits  Start date  Expiration date Misc   medicare  No auth req  bomn                    Date    Visit Number 2 3 4 5 6 - FOTO  7   Auth                  Manual         GHJ mob          STM                                    TherEx         Pulleys    20x abd   20x flx   UBE 2'/2'   PROM Performed 15 min  Performed 8 min  5 minutes 8 min  8 min  15 min    Table slides AAROM Reviewed 10x ea  Reviewed        Cane AAROM Flex x10 supine     ER 2x10 seated Flex 2x10     ER 2x10 supine     ABD 2x10 standing  Flexion 2x10 5s    ER 2x10 supine 5s    ABD 2x10 5s standing     2x10 standing flx All directions 2x10 Shoulder extension 2x10     Shoulder IR 2x10  Shoulder extension 2x10     Shoulder IR 2x10    AROM             Doorway ER stretch 3\" x10  Doorway ER stretch 3\" x10                     Neuro Re-Ed         Cervical retractions  2x10 seated  2x10 seated  2x10 seated 2x10 3x10 seated  3x10 seated  Chin tuck + ext    Scapular retraction 5\" x15  5\" x15  5s x20 RTB 2x10  RTB 2x10 GTB 2x10   Scapular depression     RTB 2x10 RTB 2x10 GTB 2x10   No monies  YTB 2x10  RTB 2x10  YTB  2x10 5s   RTB 2x10  RTB 2x10 "   Scaption   Shoulder ER iso  Shoulder ER iso with green ball 2x10 10s Shoulder ER iso with green ball 2x10 10s Shoulder ER iso with green ball 2x10 10s    Serratus punch   Iso into green ball 2x10 5s  Iso into green ball 2x10 5s  Iso into green ball 2x10 5s        RTB shoulder elevation 2x10  RTB shoulder elevation 2x10 RTB shoulder elevation 2x10                     TherAct           Pt edu: tissue healing times. Sleeping. Pelvic floor therapy x10 min   FOTO                                         Gait Training                                    Modalities         CP              Precautions: Rev TSA (~15 years ago)   Past Medical History:   Diagnosis Date    Anxiety disorder     Arthritis     Bright red rectal bleeding     Colon polyp     DJD (degenerative joint disease)     Hernia A year ago    High cholesterol     Hyperactivity of bladder     Hypertension     Irritable bowel syndrome Last July    Macular degeneration     Obesity     Osteoporosis     Plantar fasciitis     Not sure of date   Access Code: [email or web address]  URL: [email or web address]  Date: 11/12/2024  Prepared by: Capri Yeung    Exercises  - Supine Shoulder Flexion Extension AAROM with Dowel  - 1 x daily - 2 sets - 10 reps - 5s hold  - Standing Shoulder Abduction AAROM with Dowel  - 1 x daily - 2 sets - 10 reps - 5s hold  - Seated Shoulder Flexion Towel Slide at Table Top  - 1 x daily - 2 sets - 10 reps - 5s hold  - Seated Shoulder Abduction Towel Slide at Table Top  - 1 x daily - 2 sets - 10 reps - 5s hold  - Seated Shoulder External Rotation PROM on Table  - 1 x daily - 2 sets - 10 reps - 5s hold  - Seated Scapular Retraction  - 1 x daily - 2 sets - 10 reps - 5s hold  - Seated Cervical Retraction  - 1 x daily - 2 sets - 10 reps  - Standing Isometric Shoulder External Rotation with Doorway  - 1 x daily - 2 sets - 10 reps - 5s hold

## 2024-12-03 ENCOUNTER — OFFICE VISIT (OUTPATIENT)
Dept: PHYSICAL THERAPY | Facility: CLINIC | Age: 79
End: 2024-12-03
Payer: MEDICARE

## 2024-12-03 DIAGNOSIS — S43.102D SEPARATION OF LEFT ACROMIOCLAVICULAR JOINT, SUBSEQUENT ENCOUNTER: Primary | ICD-10-CM

## 2024-12-03 DIAGNOSIS — S43.102A: ICD-10-CM

## 2024-12-03 PROCEDURE — 97110 THERAPEUTIC EXERCISES: CPT

## 2024-12-03 PROCEDURE — 97112 NEUROMUSCULAR REEDUCATION: CPT

## 2024-12-03 NOTE — PROGRESS NOTES
"Daily Note     Today's date: 12/3/2024  Patient name: Neisha Salazar  : 1945  MRN: [id number]  Referring provider: Sammy Pierre MD  Dx:   Encounter Diagnosis     ICD-10-CM    1. Separation of left acromioclavicular joint, subsequent encounter  S43.102D       2. Separation of AC joint, left, initial encounter  S43.102A                      Subjective: Patient denies pain in shoulder. She states she has not been consistent with HEP secondary to increased family matters.       Objective: See treatment diary below      Assessment: Progressed POC with the addition of wall slides and OH press. She tolerated well with no adverse effects. She has been progressing nicely. She demo's nearly full range of motion with no reported pain. Due to her progress as well as change into her schedule secondary to family matters, plan to transition down to 1x per week. Patient requests to plan out schedule next visit.      Plan: Continue per plan of care.        Insurance Eval/ Re-eval POC expires Auth Status Total visits  Start date  Expiration date Misc   medicare  No auth req  bomn                    Date 11/12 11/14 11/19 11/21 11/26 12/3   Visit Number 3 4 5 6 - FOTO  7 8   Auth                  Manual         GHJ mob          STM                                    TherEx         Pulleys   20x abd   20x flx   UBE 2'/2' UBE 2'/2'   PROM Performed 8 min  5 minutes 8 min  8 min  15 min  8 min    Table slides AAROM Reviewed         Cane AAROM Flex 2x10     ER 2x10 supine     ABD 2x10 standing  Flexion 2x10 5s    ER 2x10 supine 5s    ABD 2x10 5s standing     2x10 standing flx All directions 2x10 Shoulder extension 2x10     Shoulder IR 2x10  Shoulder extension 2x10     Shoulder IR 2x10  Shoulder extension 2x10     Shoulder IR 2x10    AROM            Doorway ER stretch 3\" x10  Doorway ER stretch 3\" x10 Wall slides + lift off x15          OH press- placing weight on top shelf   1# 2x10             Neuro Re-Ed       " "  Cervical retractions  2x10 seated  2x10 seated 2x10 3x10 seated  3x10 seated  Chin tuck + ext  3x10 seated  Chin tuck + ext    Scapular retraction 5\" x15  5s x20 RTB 2x10  RTB 2x10 GTB 2x10 GTB 2x10    Scapular depression    RTB 2x10 RTB 2x10 GTB 2x10 GTB 2x10   No monies  RTB 2x10  YTB  2x10 5s   RTB 2x10  RTB 2x10 RTB 2x10    Scaption  Shoulder ER iso  Shoulder ER iso with green ball 2x10 10s Shoulder ER iso with green ball 2x10 10s Shoulder ER iso with green ball 2x10 10s     Serratus punch  Iso into green ball 2x10 5s  Iso into green ball 2x10 5s  Iso into green ball 2x10 5s        RTB shoulder elevation 2x10  RTB shoulder elevation 2x10 RTB shoulder elevation 2x10 GTB shoulder elevation 2x10                     TherAct          Pt edu: tissue healing times. Sleeping. Pelvic floor therapy x10 min   FOTO                                          Gait Training                                    Modalities         CP              Precautions: Rev TSA (~15 years ago)   Past Medical History:   Diagnosis Date    Anxiety disorder     Arthritis     Bright red rectal bleeding     Colon polyp     DJD (degenerative joint disease)     Hernia A year ago    High cholesterol     Hyperactivity of bladder     Hypertension     Irritable bowel syndrome Last July    Macular degeneration     Obesity     Osteoporosis     Plantar fasciitis     Not sure of date   Access Code: [email or web address]  URL: [email or web address]  Date: 11/12/2024  Prepared by: Capri Yeung    Exercises  - Supine Shoulder Flexion Extension AAROM with Dowel  - 1 x daily - 2 sets - 10 reps - 5s hold  - Standing Shoulder Abduction AAROM with Dowel  - 1 x daily - 2 sets - 10 reps - 5s hold  - Seated Shoulder Flexion Towel Slide at Table Top  - 1 x daily - 2 sets - 10 reps - 5s hold  - Seated Shoulder Abduction Towel Slide at Table Top  - 1 x daily - 2 sets - 10 reps - 5s hold  - Seated Shoulder External Rotation PROM on Table  - 1 x daily - 2 sets - 10 reps - 5s " hold  - Seated Scapular Retraction  - 1 x daily - 2 sets - 10 reps - 5s hold  - Seated Cervical Retraction  - 1 x daily - 2 sets - 10 reps  - Standing Isometric Shoulder External Rotation with Doorway  - 1 x daily - 2 sets - 10 reps - 5s hold

## 2024-12-05 ENCOUNTER — OFFICE VISIT (OUTPATIENT)
Dept: PHYSICAL THERAPY | Facility: CLINIC | Age: 79
End: 2024-12-05
Payer: MEDICARE

## 2024-12-05 DIAGNOSIS — S43.102A: ICD-10-CM

## 2024-12-05 DIAGNOSIS — S43.102D SEPARATION OF LEFT ACROMIOCLAVICULAR JOINT, SUBSEQUENT ENCOUNTER: Primary | ICD-10-CM

## 2024-12-05 PROCEDURE — 97112 NEUROMUSCULAR REEDUCATION: CPT

## 2024-12-05 PROCEDURE — 97110 THERAPEUTIC EXERCISES: CPT

## 2024-12-05 NOTE — PROGRESS NOTES
"Daily Note     Today's date: 2024  Patient name: Neisha Salazar  : 1945  MRN: [id number]  Referring provider: Sammy Pierre MD  Dx:   Encounter Diagnosis     ICD-10-CM    1. Separation of left acromioclavicular joint, subsequent encounter  S43.102D       2. Separation of AC joint, left, initial encounter  S43.102A                      Subjective: \"my shoulder has felt better\"       Objective: See treatment diary below      Assessment: Tolerated treatment well. Included repeated shoulder extension manually, she tolerated well with improved shoulder elevation (utilized as comparable sign). Light progression of RTC and periscapular strengthening. No adverse effects noted. She continues to demonstrate scapular elevation compensation with shoulder elevation, but it has been continuously decreasing with each visit.  Patient demonstrated fatigue post treatment, exhibited good technique with therapeutic exercises, and would benefit from continued PT to promote AROM mobility without compensation.       Plan: Progress note during next visit.        Insurance Eval/ Re-eval POC expires Auth Status Total visits  Start date  Expiration date Misc   medicare  No auth req  bomn                    Date 11/14 11/19 11/21 11/26 12/3 12/5   Visit Number 4 5 6 - FOTO  7 8 9   Auth                  Manual         GHJ mob       Manual repeated shoulder extension    STM                                    TherEx         Pulleys  20x abd   20x flx   UBE 2'/2' UBE 2'/2' UBE 2'/2'   PROM 5 minutes 8 min  8 min  15 min  8 min  10 min    Table slides AAROM         Cane AAROM Flexion 2x10 5s    ER 2x10 supine 5s    ABD 2x10 5s standing     2x10 standing flx All directions 2x10 Shoulder extension 2x10     Shoulder IR 2x10  Shoulder extension 2x10     Shoulder IR 2x10  Shoulder extension 2x10     Shoulder IR 2x10     AROM           Doorway ER stretch 3\" x10  Doorway ER stretch 3\" x10 Wall slides + lift off x15  Wall " slides + lift off x15         OH press- placing weight on top shelf   1# 2x10  OH press- placing weight on top shelf   1# 2x10             Neuro Re-Ed         Cervical retractions  2x10 seated 2x10 3x10 seated  3x10 seated  Chin tuck + ext  3x10 seated  Chin tuck + ext  3x10 seated  Chin tuck + ext - throughout session   Scapular retraction 5s x20 RTB 2x10  RTB 2x10 GTB 2x10 GTB 2x10  4# 2x10   Scapular depression   RTB 2x10 RTB 2x10 GTB 2x10 GTB 2x10 4# 2x10   No monies  YTB  2x10 5s   RTB 2x10  RTB 2x10 RTB 2x10  RTB 2x10   Scaption  Shoulder ER iso with green ball 2x10 10s Shoulder ER iso with green ball 2x10 10s Shoulder ER iso with green ball 2x10 10s      Serratus punch Iso into green ball 2x10 5s  Iso into green ball 2x10 5s  Iso into green ball 2x10 5s        RTB shoulder elevation 2x10  RTB shoulder elevation 2x10 RTB shoulder elevation 2x10 GTB shoulder elevation 2x10 GTB shoulder abduction 2x10                      TherAct            FOTO                                           Gait Training                                    Modalities         CP              Precautions: Rev TSA (~15 years ago)   Past Medical History:   Diagnosis Date    Anxiety disorder     Arthritis     Bright red rectal bleeding     Colon polyp     DJD (degenerative joint disease)     Hernia A year ago    High cholesterol     Hyperactivity of bladder     Hypertension     Irritable bowel syndrome Last July    Macular degeneration     Obesity     Osteoporosis     Plantar fasciitis     Not sure of date   Access Code: [email or web address]  URL: [email or web address]  Date: 11/12/2024  Prepared by: Capri Yeung    Exercises  - Supine Shoulder Flexion Extension AAROM with Dowel  - 1 x daily - 2 sets - 10 reps - 5s hold  - Standing Shoulder Abduction AAROM with Dowel  - 1 x daily - 2 sets - 10 reps - 5s hold  - Seated Shoulder Flexion Towel Slide at Table Top  - 1 x daily - 2 sets - 10 reps - 5s hold  - Seated Shoulder Abduction Towel  Slide at Table Top  - 1 x daily - 2 sets - 10 reps - 5s hold  - Seated Shoulder External Rotation PROM on Table  - 1 x daily - 2 sets - 10 reps - 5s hold  - Seated Scapular Retraction  - 1 x daily - 2 sets - 10 reps - 5s hold  - Seated Cervical Retraction  - 1 x daily - 2 sets - 10 reps  - Standing Isometric Shoulder External Rotation with Doorway  - 1 x daily - 2 sets - 10 reps - 5s hold

## 2024-12-10 ENCOUNTER — APPOINTMENT (OUTPATIENT)
Dept: PHYSICAL THERAPY | Facility: CLINIC | Age: 79
End: 2024-12-10
Payer: MEDICARE

## 2024-12-12 ENCOUNTER — APPOINTMENT (OUTPATIENT)
Dept: PHYSICAL THERAPY | Facility: CLINIC | Age: 79
End: 2024-12-12
Payer: MEDICARE

## 2024-12-18 ENCOUNTER — APPOINTMENT (OUTPATIENT)
Dept: RADIOLOGY | Facility: CLINIC | Age: 79
End: 2024-12-18
Payer: MEDICARE

## 2024-12-18 ENCOUNTER — OFFICE VISIT (OUTPATIENT)
Dept: OBGYN CLINIC | Facility: CLINIC | Age: 79
End: 2024-12-18
Payer: MEDICARE

## 2024-12-18 VITALS
HEART RATE: 77 BPM | DIASTOLIC BLOOD PRESSURE: 90 MMHG | WEIGHT: 176 LBS | HEIGHT: 62 IN | OXYGEN SATURATION: 100 % | BODY MASS INDEX: 32.39 KG/M2 | SYSTOLIC BLOOD PRESSURE: 155 MMHG

## 2024-12-18 DIAGNOSIS — S43.102A: ICD-10-CM

## 2024-12-18 DIAGNOSIS — M25.512 LEFT SHOULDER PAIN, UNSPECIFIED CHRONICITY: ICD-10-CM

## 2024-12-18 DIAGNOSIS — M25.512 LEFT SHOULDER PAIN, UNSPECIFIED CHRONICITY: Primary | ICD-10-CM

## 2024-12-18 PROCEDURE — 99213 OFFICE O/P EST LOW 20 MIN: CPT | Performed by: ORTHOPAEDIC SURGERY

## 2024-12-18 PROCEDURE — 73030 X-RAY EXAM OF SHOULDER: CPT

## 2024-12-18 NOTE — PROGRESS NOTES
Assessment/Plan:  1. Left shoulder pain, unspecified chronicity  XR shoulder 2+ vw left      2. Separation of AC joint, left, initial encounter          The patient is doing well and has no pain. She can transition to home exercises and can proceed with activity as tolerated. She will FU as needed.     Subjective:   Neisha Salazar is a 79 y.o. female who presents today for follow-up of her left shoulder, I have been treating her conservatively for a AC separation after shoulder arthroplasty 15 years ago . She has been doing PT and denies any pain about the shoulder at this point and notes good ROM and strength. She feels she is back to baseline with the shoulder.       Review of Systems   Constitutional: Negative.  Negative for chills and fever.   HENT: Negative.  Negative for ear pain and sore throat.    Eyes: Negative.  Negative for pain and redness.   Respiratory: Negative.  Negative for shortness of breath and wheezing.    Cardiovascular:  Negative for chest pain and palpitations.   Gastrointestinal: Negative.  Negative for abdominal pain and blood in stool.   Endocrine: Negative.  Negative for polydipsia and polyuria.   Genitourinary: Negative.  Negative for difficulty urinating and dysuria.   Musculoskeletal:         As noted in HPI   Skin: Negative.  Negative for pallor and rash.   Neurological: Negative.  Negative for dizziness and numbness.   Hematological: Negative.  Negative for adenopathy. Does not bruise/bleed easily.   Psychiatric/Behavioral: Negative.  Negative for confusion and suicidal ideas.          Past Medical History:   Diagnosis Date   • Anxiety disorder    • Arthritis    • Bright red rectal bleeding    • Colon polyp    • DJD (degenerative joint disease)    • Hernia A year ago   • High cholesterol    • Hyperactivity of bladder    • Hypertension    • Irritable bowel syndrome Last July   • Macular degeneration    • Obesity    • Osteoporosis    • Plantar fasciitis     Not sure of date       Past  Surgical History:   Procedure Laterality Date   • CARDIAC SURGERY     • CHOLECYSTECTOMY     • COLONOSCOPY     • DXA PROCEDURE (HISTORICAL)  06/23/2021   • INTRAOCULAR LENS INSERTION     • MAMMO (HISTORICAL)  01/04/2022   • TONSILLECTOMY     • TOTAL SHOULDER REPLACEMENT Left        Family History   Problem Relation Age of Onset   • Hearing loss Mother    • Esophageal cancer Father    • Hypertension Sister    • No Known Problems Sister    • Esophageal cancer Maternal Grandmother    • Diabetes Paternal Grandmother    • Breast cancer Maternal Aunt 70   • No Known Problems Maternal Aunt    • No Known Problems Maternal Aunt    • No Known Problems Maternal Aunt    • Skin cancer Paternal Aunt    • Melanoma Paternal Aunt         unknown   • No Known Problems Paternal Aunt    • No Known Problems Paternal Aunt    • Cancer Paternal Aunt    • Breast cancer Cousin 77       Social History     Occupational History   • Not on file   Tobacco Use   • Smoking status: Former     Current packs/day: 0.00     Types: Cigarettes     Passive exposure: Past   • Smokeless tobacco: Never   • Tobacco comments:     quit cigarettes age 27   Vaping Use   • Vaping status: Never Used   Substance and Sexual Activity   • Alcohol use: No   • Drug use: Never   • Sexual activity: Not Currently     Partners: Male     Birth control/protection: Male Sterilization         Current Outpatient Medications:   •  ALPRAZolam (XANAX) 0.25 mg tablet, Take 1 tablet (0.25 mg total) by mouth 2 (two) times a day as needed for anxiety, Disp: 40 tablet, Rfl: 0  •  aspirin (ECOTRIN LOW STRENGTH) 81 mg EC tablet, Take 81 mg by mouth daily, Disp: , Rfl:   •  Biotin 1000 MCG tablet, Take 1,000 mcg by mouth daily, Disp: , Rfl:   •  Calcium Acetate, Phos Binder, (CALCIUM ACETATE PO), Take 2 tablets by mouth daily CALCIUM CARBONATE/VITAMIN D3 (CALCIUM 600 WITH VITAMIN D3 ORAL), Disp: , Rfl:   •  Cranberry 200 MG CAPS, Take by mouth in the morning, Disp: , Rfl:   •  hydrocortisone  (ANUSOL-HC) 2.5 % rectal cream, Apply topically 2 (two) times a day as needed for hemorrhoids, Disp: 28 g, Rfl: 2  •  Multiple Vitamins-Minerals (PRESERVISION AREDS 2 PO), Take by mouth 2 (two) times a day, Disp: , Rfl:   •  Potassium 95 MG TABS, Potassium TABS  Refills: 0  Active, Disp: , Rfl:   •  vitamin B-12 (VITAMIN B-12) 1,000 mcg tablet, Take 1 tablet (1,000 mcg total) by mouth daily, Disp: 30 tablet, Rfl: 2  •  Flaxseed, Linseed, (FLAX SEED OIL) 1000 MG CAPS, Flax Seed Oil 1000 MG Oral Capsule  Refills: 0  Active (Patient not taking: Reported on 12/18/2024), Disp: , Rfl:   •  NATURAL PSYLLIUM SEED PO, Take 1 tablet by mouth every evening (Patient not taking: Reported on 12/18/2024), Disp: , Rfl:   •  Omega-3 Fatty Acids (FISH OIL) 645 MG CAPS, Fish Oil CAPS  Refills: 0  Active (Patient not taking: Reported on 12/18/2024), Disp: , Rfl:   •  oxybutynin (DITROPAN) 5 mg tablet, 5 mg (Patient not taking: Reported on 12/18/2024), Disp: , Rfl:   •  solifenacin (VESICARE) 5 mg tablet, Take 1 tablet (5 mg total) by mouth daily (Patient not taking: Reported on 10/9/2024), Disp: 30 tablet, Rfl: 1  •  triamcinolone (KENALOG) 0.5 % cream, Apply topically 2 (two) times a day (Patient not taking: Reported on 12/18/2024), Disp: 15 g, Rfl: 2  •  TURMERIC PO, Take 1 tablet by mouth every evening (Patient not taking: Reported on 10/9/2024), Disp: , Rfl:     Allergies   Allergen Reactions   • Zithromax [Azithromycin] Swelling   • Atorvastatin Other (See Comments)     legs get stiff   • Erythromycin Base Palpitations       Objective:  Vitals:    12/18/24 0956   BP: 155/90   Pulse: 77   SpO2: 100%     Pain Score: 0-No pain      Left Shoulder Exam     Tenderness   The patient is experiencing no tenderness.     Range of Motion   Forward flexion:  150     Tests   Drop arm: negative    Other   Erythema: absent  Sensation: normal  Pulse: present             Physical Exam  Constitutional:       General: She is not in acute distress.      Appearance: She is well-developed.   HENT:      Head: Normocephalic and atraumatic.   Eyes:      General: No scleral icterus.     Conjunctiva/sclera: Conjunctivae normal.   Neck:      Vascular: No JVD.   Cardiovascular:      Rate and Rhythm: Normal rate.   Pulmonary:      Effort: Pulmonary effort is normal. No respiratory distress.   Musculoskeletal:      Comments: As per HPI   Skin:     General: Skin is warm.   Neurological:      Mental Status: She is alert and oriented to person, place, and time.      Coordination: Coordination normal.         I have personally reviewed pertinent films in PACS and my interpretation is as follows:  Xrays  left shoulder: Stable shoulder arthroplasty and AC separation.       This document was created using speech voice recognition software.   Grammatical errors, random word insertions, pronoun errors, and incomplete sentences are an occasional consequence of this system due to software limitations, ambient noise, and hardware issues.   Any formal questions or concerns about content, text, or information contained within the body of this dictation should be directly addressed to the provider for clarification.

## 2024-12-19 ENCOUNTER — APPOINTMENT (OUTPATIENT)
Dept: PHYSICAL THERAPY | Facility: CLINIC | Age: 79
End: 2024-12-19
Payer: MEDICARE

## 2024-12-24 ENCOUNTER — APPOINTMENT (OUTPATIENT)
Dept: PHYSICAL THERAPY | Facility: CLINIC | Age: 79
End: 2024-12-24
Payer: MEDICARE

## 2024-12-26 ENCOUNTER — APPOINTMENT (OUTPATIENT)
Dept: PHYSICAL THERAPY | Facility: CLINIC | Age: 79
End: 2024-12-26
Payer: MEDICARE

## 2024-12-26 NOTE — PROGRESS NOTES
Office Visit Note  25     Neisha Salazar 79 y.o. female MRN: [id number]  : 1945    Assessment:     1. Leukopenia, unspecified type  Assessment & Plan:  Last CBC had shown white cell count of 3.93 patient to have a repeat CBC done we will follow it up  2. Age-related osteoporosis without current pathological fracture  Assessment & Plan:  Patient with osteoporosis currently not on any prescription medications could not tolerate Fosamax afraid to go on Prolia injection.  Patient is currently taking calcium tablets 1200 mg daily along with vitamin D 2000 units daily will continue  3. Class 1 obesity due to excess calories with serious comorbidity and body mass index (BMI) of 33.0 to 33.9 in adult  Assessment & Plan:    BMI slightly less compared to before at 31.42 emphasized regarding diet exercise lifestyle modification  4. Other hemorrhoids  Assessment & Plan:  Patient started getting constipation when she was taking the Vesicare tablets she has stopped that constipation is better whenever the constipation develops I will hemorrhoids are flaring up currently using Anusol HC 2.5% rectal cream as needed  Orders:  -     hydrocortisone (ANUSOL-HC) 2.5 % rectal cream; Apply topically 2 (two) times a day as needed for hemorrhoids  5. Prediabetes  Assessment & Plan:  A1c 5.7 emphasized regarding cutting back carbohydrate intake  6. Anxiety and depression  Assessment & Plan:  Patient takes Xanax occasionally continue  7. Primary hypertension  Assessment & Plan:  Today's blood pressure is 126/76 currently not on any medication she was on medications in the past but at present time it is being managed without taking medication diet control cutting back salt intake.  8. B12 deficiency  Assessment & Plan:  Continue vitamin B12 1000 mcg daily    9. Dyslipidemia  Assessment & Plan:  Last cholesterol level done couple of months back was 185 currently not on any medications LDL is 115 HDL is 54 and triglycerides 81  10.  Uterovaginal prolapse  Assessment & Plan:  Follow-up with the urologist             Discussion Summary and Plan:  Today's care plan and medications were reviewed with patient in detail and all their questions answered to their satisfaction.    Chief Complaint   Patient presents with    Follow-up      Subjective:  Patient is coming for a follow-up evaluation with regards to symptoms of hypertension, leukopenia, history of hemorrhoids which has been bothering her on and off, overactive bladder.  Denies any symptoms of chest pain palpitation shortness of breath.  Medication reviewed labs reviewed patient was supposed to get his repeat CBC done because of the decreased WBC count not done yet.    Patient has stopped taking the Ditropan and the Vesicare since patient has been having constipation which was causing flareup of her hemorrhoids we will hold it off for now.    Follow-up with the CBC.        The following portions of the patient's history were reviewed and updated as appropriate: allergies, current medications, past family history, past medical history, past social history, past surgical history and problem list.    Review of Systems   Constitutional:  Negative for chills and fever.   HENT:  Negative for ear pain and sore throat.    Eyes:  Negative for pain and visual disturbance.   Respiratory:  Negative for cough and shortness of breath.    Cardiovascular:  Negative for chest pain and palpitations.   Gastrointestinal:  Positive for constipation. Negative for abdominal pain and vomiting.   Genitourinary:  Negative for dysuria and hematuria.   Musculoskeletal:  Positive for arthralgias. Negative for back pain.   Skin:  Negative for color change and rash.   Neurological:  Negative for seizures and syncope.   All other systems reviewed and are negative.        Historical Information   Patient Active Problem List   Diagnosis    Primary hypertension    Class 1 obesity due to excess calories in adult    Dyslipidemia     Primary osteoarthritis involving multiple joints    Urinary frequency    Muscle cramps    Unsteady gait    Anxiety and depression    Other hemorrhoids    Uterovaginal prolapse    Urinary incontinence    Near syncope    Prediabetes    Nocturia    Rash    Age-related osteoporosis without current pathological fracture    B12 deficiency    Leukopenia     Past Medical History:   Diagnosis Date    Anxiety disorder     Arthritis     Bright red rectal bleeding     Colon polyp     DJD (degenerative joint disease)     Hernia A year ago    High cholesterol     Hyperactivity of bladder     Hypertension     Irritable bowel syndrome Last July    Macular degeneration     Obesity     Osteoporosis     Plantar fasciitis     Not sure of date     Past Surgical History:   Procedure Laterality Date    CARDIAC SURGERY      CHOLECYSTECTOMY      COLONOSCOPY      DXA PROCEDURE (HISTORICAL)  06/23/2021    INTRAOCULAR LENS INSERTION      MAMMO (HISTORICAL)  01/04/2022    TONSILLECTOMY      TOTAL SHOULDER REPLACEMENT Left      Social History     Substance and Sexual Activity   Alcohol Use No     Social History     Substance and Sexual Activity   Drug Use Never     Social History     Tobacco Use   Smoking Status Former    Current packs/day: 0.00    Types: Cigarettes    Passive exposure: Past   Smokeless Tobacco Never   Tobacco Comments    quit cigarettes age 27     Family History   Problem Relation Age of Onset    Hearing loss Mother     Esophageal cancer Father     Hypertension Sister     No Known Problems Sister     Esophageal cancer Maternal Grandmother     Diabetes Paternal Grandmother     Breast cancer Maternal Aunt 70    No Known Problems Maternal Aunt     No Known Problems Maternal Aunt     No Known Problems Maternal Aunt     Skin cancer Paternal Aunt     Melanoma Paternal Aunt         unknown    No Known Problems Paternal Aunt     No Known Problems Paternal Aunt     Cancer Paternal Aunt     Breast cancer Cousin 77     Health  "Maintenance Due   Topic    Hepatitis C Screening     Pneumococcal Vaccine: 65+ Years (1 of 1 - PCV)    RSV Vaccine Age 60+ Years (1 - 1-dose 75+ series)      Meds/Allergies       Current Outpatient Medications:     ALPRAZolam (XANAX) 0.25 mg tablet, Take 1 tablet (0.25 mg total) by mouth 2 (two) times a day as needed for anxiety, Disp: 40 tablet, Rfl: 0    aspirin (ECOTRIN LOW STRENGTH) 81 mg EC tablet, Take 81 mg by mouth daily, Disp: , Rfl:     Biotin 1000 MCG tablet, Take 1,000 mcg by mouth daily, Disp: , Rfl:     Calcium Acetate, Phos Binder, (CALCIUM ACETATE PO), Take 2 tablets by mouth daily CALCIUM CARBONATE/VITAMIN D3 (CALCIUM 600 WITH VITAMIN D3 ORAL), Disp: , Rfl:     Cranberry 200 MG CAPS, Take by mouth in the morning, Disp: , Rfl:     hydrocortisone (ANUSOL-HC) 2.5 % rectal cream, Apply topically 2 (two) times a day as needed for hemorrhoids, Disp: 28 g, Rfl: 2    Multiple Vitamins-Minerals (PRESERVISION AREDS 2 PO), Take by mouth 2 (two) times a day, Disp: , Rfl:     vitamin B-12 (VITAMIN B-12) 1,000 mcg tablet, Take 1 tablet (1,000 mcg total) by mouth daily, Disp: 30 tablet, Rfl: 2      Objective:    Vitals:   /76 (BP Location: Right arm, Patient Position: Sitting, Cuff Size: Standard)   Pulse 78   Ht 5' 2\" (1.575 m)   Wt 77.9 kg (171 lb 12.8 oz)   SpO2 100%   BMI 31.42 kg/m²   Body mass index is 31.42 kg/m².  Vitals:    12/30/24 1226   Weight: 77.9 kg (171 lb 12.8 oz)       Physical Exam  Vitals and nursing note reviewed.   Constitutional:       Appearance: Normal appearance. She is obese.   Cardiovascular:      Rate and Rhythm: Normal rate and regular rhythm.      Heart sounds: Normal heart sounds.   Pulmonary:      Effort: Pulmonary effort is normal.      Breath sounds: Normal breath sounds.   Abdominal:      Palpations: Abdomen is soft.   Musculoskeletal:      Cervical back: Normal range of motion and neck supple.      Right lower leg: No edema.      Left lower leg: No edema.      " Comments: Left shoulder movements causing discomfort and pain   Skin:     General: Skin is dry.   Neurological:      Mental Status: She is alert.   Psychiatric:         Mood and Affect: Mood normal.         Behavior: Behavior normal.         Lab Review   No visits with results within 2 Month(s) from this visit.   Latest known visit with results is:   Appointment on 10/26/2024   Component Date Value Ref Range Status    WBC 10/26/2024 3.93 (L)  4.31 - 10.16 Thousand/uL Final    RBC 10/26/2024 4.06  3.81 - 5.12 Million/uL Final    Hemoglobin 10/26/2024 12.1  11.5 - 15.4 g/dL Final    Hematocrit 10/26/2024 37.6  34.8 - 46.1 % Final    MCV 10/26/2024 93  82 - 98 fL Final    MCH 10/26/2024 29.8  26.8 - 34.3 pg Final    MCHC 10/26/2024 32.2  31.4 - 37.4 g/dL Final    RDW 10/26/2024 14.3  11.6 - 15.1 % Final    MPV 10/26/2024 9.8  8.9 - 12.7 fL Final    Platelets 10/26/2024 263  149 - 390 Thousands/uL Final    nRBC 10/26/2024 0  /100 WBCs Final    Segmented % 10/26/2024 47  43 - 75 % Final    Immature Grans % 10/26/2024 0  0 - 2 % Final    Lymphocytes % 10/26/2024 34  14 - 44 % Final    Monocytes % 10/26/2024 13 (H)  4 - 12 % Final    Eosinophils Relative 10/26/2024 5  0 - 6 % Final    Basophils Relative 10/26/2024 1  0 - 1 % Final    Absolute Neutrophils 10/26/2024 1.84 (L)  1.85 - 7.62 Thousands/µL Final    Absolute Immature Grans 10/26/2024 0.01  0.00 - 0.20 Thousand/uL Final    Absolute Lymphocytes 10/26/2024 1.32  0.60 - 4.47 Thousands/µL Final    Absolute Monocytes 10/26/2024 0.52  0.17 - 1.22 Thousand/µL Final    Eosinophils Absolute 10/26/2024 0.20  0.00 - 0.61 Thousand/µL Final    Basophils Absolute 10/26/2024 0.04  0.00 - 0.10 Thousands/µL Final    Sodium 10/26/2024 138  135 - 147 mmol/L Final    Potassium 10/26/2024 4.0  3.5 - 5.3 mmol/L Final    Chloride 10/26/2024 106  96 - 108 mmol/L Final    CO2 10/26/2024 26  21 - 32 mmol/L Final    ANION GAP 10/26/2024 6  4 - 13 mmol/L Final    BUN 10/26/2024 11  5 - 25  mg/dL Final    Creatinine 10/26/2024 0.68  0.60 - 1.30 mg/dL Final    Standardized to IDMS reference method    Glucose, Fasting 10/26/2024 103 (H)  65 - 99 mg/dL Final    Calcium 10/26/2024 8.6  8.4 - 10.2 mg/dL Final    AST 10/26/2024 9 (L)  13 - 39 U/L Final    ALT 10/26/2024 11  7 - 52 U/L Final    Specimen collection should occur prior to Sulfasalazine administration due to the potential for falsely depressed results.     Alkaline Phosphatase 10/26/2024 68  34 - 104 U/L Final    Total Protein 10/26/2024 6.3 (L)  6.4 - 8.4 g/dL Final    Albumin 10/26/2024 3.5  3.5 - 5.0 g/dL Final    Total Bilirubin 10/26/2024 0.42  0.20 - 1.00 mg/dL Final    Use of this assay is not recommended for patients undergoing treatment with eltrombopag due to the potential for falsely elevated results.  N-acetyl-p-benzoquinone imine (metabolite of Acetaminophen) will generate erroneously low results in samples for patients that have taken an overdose of Acetaminophen.    eGFR 10/26/2024 84  ml/min/1.73sq m Final    Hemoglobin A1C 10/26/2024 5.7 (H)  Normal 4.0-5.6%; PreDiabetic 5.7-6.4%; Diabetic >=6.5%; Glycemic control for adults with diabetes <7.0% % Final    EAG 10/26/2024 117  mg/dl Final    Cholesterol 10/26/2024 185  See Comment mg/dL Final    Cholesterol:         Pediatric <18 Years        Desirable          <170 mg/dL      Borderline High    170-199 mg/dL      High               >=200 mg/dL        Adult >=18 Years            Desirable         <200 mg/dL      Borderline High   200-239 mg/dL      High              >239 mg/dL      Triglycerides 10/26/2024 81  See Comment mg/dL Final    Triglyceride:     0-9Y            <75mg/dL     10Y-17Y         <90 mg/dL       >=18Y     Normal          <150 mg/dL     Borderline High 150-199 mg/dL     High            200-499 mg/dL        Very High       >499 mg/dL    Specimen collection should occur prior to Metamizole administration due to the potential for falsely depressed results.    HDL,  Direct 10/26/2024 54  >=50 mg/dL Final    LDL Calculated 10/26/2024 115 (H)  0 - 100 mg/dL Final    LDL Cholesterol:     Optimal           <100 mg/dl     Near Optimal      100-129 mg/dl     Above Optimal       Borderline High 130-159 mg/dl       High            160-189 mg/dl       Very High       >189 mg/dl         This screening LDL is a calculated result.   It does not have the accuracy of the Direct Measured LDL in the monitoring of patients with hyperlipidemia and/or statin therapy.   Direct Measure LDL (TVQ045) must be ordered separately in these patients.    Non-HDL-Chol (CHOL-HDL) 10/26/2024 131  mg/dl Final    TSH 3RD GENERATON 10/26/2024 2.185  0.450 - 4.500 uIU/mL Final    The recommended reference ranges for TSH during pregnancy are as follows:   First trimester 0.100 to 2.500 uIU/mL   Second trimester  0.200 to 3.000 uIU/mL   Third trimester 0.300 to 3.000 uIU/m    Note: Normal ranges may not apply to patients who are transgender, non-binary, or whose legal sex, sex at birth, and gender identity differ.  Adult TSH (3rd generation) reference range follows the recommended guidelines of the American Thy[employer] Association, January, 2020.    Color, UA 10/26/2024 Light Yellow   Final    Clarity, UA 10/26/2024 Slightly Cloudy   Final    Specific Gravity, UA 10/26/2024 1.020  1.005 - 1.030 Final    pH, UA 10/26/2024 7.0  4.5, 5.0, 5.5, 6.0, 6.5, 7.0, 7.5, 8.0 Final    Leukocytes, UA 10/26/2024 Negative  Negative Final    Nitrite, UA 10/26/2024 Negative  Negative Final    Protein, UA 10/26/2024 Negative  Negative mg/dl Final    Glucose, UA 10/26/2024 Negative  Negative mg/dl Final    Ketones, UA 10/26/2024 Negative  Negative mg/dl Final    Urobilinogen, UA 10/26/2024 <2.0  <2.0 mg/dl mg/dl Final    Bilirubin, UA 10/26/2024 Negative  Negative Final    Occult Blood, UA 10/26/2024 Negative  Negative Final    Vitamin B-12 10/26/2024 386  180 - 914 pg/mL Final    Vit D, 25-Hydroxy 10/26/2024 69.3  30.0 - 100.0 ng/mL  "Final    Vitamin D guidelines established by Clinical Guidelines Subcommittee  of the Endocrine Society Task Force, 2011    Deficiency <20ng/ml   Insufficiency 20-30ng/ml   Sufficient  ng/ml          Tia Warren MD        \"This note has been constructed using a voice recognition system.Therefore there may be syntax, spelling, and/or grammatical errors. Please call if you have any questions. \"  "

## 2024-12-27 ENCOUNTER — RA CDI HCC (OUTPATIENT)
Dept: OTHER | Facility: HOSPITAL | Age: 79
End: 2024-12-27

## 2024-12-30 ENCOUNTER — OFFICE VISIT (OUTPATIENT)
Dept: FAMILY MEDICINE CLINIC | Facility: CLINIC | Age: 79
End: 2024-12-30
Payer: MEDICARE

## 2024-12-30 VITALS
BODY MASS INDEX: 31.62 KG/M2 | HEART RATE: 78 BPM | SYSTOLIC BLOOD PRESSURE: 126 MMHG | WEIGHT: 171.8 LBS | DIASTOLIC BLOOD PRESSURE: 76 MMHG | HEIGHT: 62 IN | OXYGEN SATURATION: 100 %

## 2024-12-30 DIAGNOSIS — F41.9 ANXIETY AND DEPRESSION: ICD-10-CM

## 2024-12-30 DIAGNOSIS — M81.0 AGE-RELATED OSTEOPOROSIS WITHOUT CURRENT PATHOLOGICAL FRACTURE: ICD-10-CM

## 2024-12-30 DIAGNOSIS — D72.819 LEUKOPENIA, UNSPECIFIED TYPE: Primary | ICD-10-CM

## 2024-12-30 DIAGNOSIS — E78.5 DYSLIPIDEMIA: ICD-10-CM

## 2024-12-30 DIAGNOSIS — E66.811 CLASS 1 OBESITY DUE TO EXCESS CALORIES WITH SERIOUS COMORBIDITY AND BODY MASS INDEX (BMI) OF 33.0 TO 33.9 IN ADULT: ICD-10-CM

## 2024-12-30 DIAGNOSIS — I10 PRIMARY HYPERTENSION: ICD-10-CM

## 2024-12-30 DIAGNOSIS — N81.4 UTEROVAGINAL PROLAPSE: ICD-10-CM

## 2024-12-30 DIAGNOSIS — F32.A ANXIETY AND DEPRESSION: ICD-10-CM

## 2024-12-30 DIAGNOSIS — R73.03 PREDIABETES: ICD-10-CM

## 2024-12-30 DIAGNOSIS — E66.09 CLASS 1 OBESITY DUE TO EXCESS CALORIES WITH SERIOUS COMORBIDITY AND BODY MASS INDEX (BMI) OF 33.0 TO 33.9 IN ADULT: ICD-10-CM

## 2024-12-30 DIAGNOSIS — E53.8 B12 DEFICIENCY: ICD-10-CM

## 2024-12-30 DIAGNOSIS — K64.8 OTHER HEMORRHOIDS: ICD-10-CM

## 2024-12-30 PROCEDURE — 99214 OFFICE O/P EST MOD 30 MIN: CPT | Performed by: INTERNAL MEDICINE

## 2024-12-30 PROCEDURE — G2211 COMPLEX E/M VISIT ADD ON: HCPCS | Performed by: INTERNAL MEDICINE

## 2024-12-30 RX ORDER — ALPRAZOLAM 0.25 MG/1
0.25 TABLET ORAL 2 TIMES DAILY PRN
Qty: 40 TABLET | Refills: 0 | Status: CANCELLED | OUTPATIENT
Start: 2024-12-30

## 2024-12-30 RX ORDER — HYDROCORTISONE 25 MG/G
CREAM TOPICAL 2 TIMES DAILY PRN
Qty: 28 G | Refills: 2 | Status: SHIPPED | OUTPATIENT
Start: 2024-12-30

## 2025-01-01 NOTE — ASSESSMENT & PLAN NOTE
Patient with osteoporosis currently not on any prescription medications could not tolerate Fosamax afraid to go on Prolia injection.  Patient is currently taking calcium tablets 1200 mg daily along with vitamin D 2000 units daily will continue

## 2025-01-01 NOTE — ASSESSMENT & PLAN NOTE
BMI slightly less compared to before at 31.42 emphasized regarding diet exercise lifestyle modification

## 2025-01-01 NOTE — ASSESSMENT & PLAN NOTE
Patient started getting constipation when she was taking the Vesicare tablets she has stopped that constipation is better whenever the constipation develops I will hemorrhoids are flaring up currently using Anusol HC 2.5% rectal cream as needed

## 2025-01-01 NOTE — ASSESSMENT & PLAN NOTE
Last cholesterol level done couple of months back was 185 currently not on any medications LDL is 115 HDL is 54 and triglycerides 81

## 2025-01-01 NOTE — ASSESSMENT & PLAN NOTE
Last CBC had shown white cell count of 3.93 patient to have a repeat CBC done we will follow it up

## 2025-01-01 NOTE — ASSESSMENT & PLAN NOTE
Today's blood pressure is 126/76 currently not on any medication she was on medications in the past but at present time it is being managed without taking medication diet control cutting back salt intake.

## 2025-01-27 ENCOUNTER — OFFICE VISIT (OUTPATIENT)
Age: 80
End: 2025-01-27
Payer: MEDICARE

## 2025-01-27 VITALS
DIASTOLIC BLOOD PRESSURE: 77 MMHG | RESPIRATION RATE: 18 BRPM | SYSTOLIC BLOOD PRESSURE: 111 MMHG | HEIGHT: 62 IN | WEIGHT: 171 LBS | BODY MASS INDEX: 31.47 KG/M2 | HEART RATE: 82 BPM

## 2025-01-27 DIAGNOSIS — M79.671 PAIN IN BOTH FEET: ICD-10-CM

## 2025-01-27 DIAGNOSIS — I70.209 PERIPHERAL ARTERIOSCLEROSIS (HCC): Primary | ICD-10-CM

## 2025-01-27 DIAGNOSIS — M79.672 PAIN IN BOTH FEET: ICD-10-CM

## 2025-01-27 DIAGNOSIS — B35.1 ONYCHOMYCOSIS: ICD-10-CM

## 2025-01-27 PROCEDURE — RECHECK: Performed by: PODIATRIST

## 2025-01-27 PROCEDURE — 11721 DEBRIDE NAIL 6 OR MORE: CPT | Performed by: PODIATRIST

## 2025-01-27 NOTE — PROGRESS NOTES
Assessment/Plan:  Pain.  Mycosis of nail.  Paronychia.  Peripheral artery disease.     Plan.  Chart reviewed.  Foot exam performed.  Patient educated on condition.  All mycotic nails debrided without pain or complication.  Nails debrided mechanically with nail nipper.  Aftercare instruction given.  Return for follow-up     Subjective:   Patient complains of pain in her feet with ambulation.  No history of trauma.             Past Medical History:   Diagnosis Date    Hypertension      Macular degeneration                     Past Surgical History:   Procedure Laterality Date    INTRAOCULAR LENS INSERTION                       Allergies   Allergen Reactions    Atorvastatin Other (See Comments)       legs get stiff    Zithromax [Azithromycin]      Erythromycin Base Palpitations            Current Outpatient Prescriptions:     Calcium Carb-Cholecalciferol (CALCIUM 1000 + D) 1000-800 MG-UNIT TABS, Calcium TABS  Refills: 0  Active, Disp: , Rfl:     cholecalciferol (VITAMIN D3) 1,000 units tablet, Vitamin D TABS  Refills: 0  Active, Disp: , Rfl:     Cinnamon 500 MG capsule, Cinnamon CAPS  Refills: 0  Active, Disp: , Rfl:     cycloSPORINE (RESTASIS) 0.05 % ophthalmic emulsion, Restasis 0.05 % Ophthalmic Emulsion  Refills: 0  Active, Disp: , Rfl:     Flaxseed, Linseed, (FLAX SEED OIL) 1000 MG CAPS, Flax Seed Oil 1000 MG Oral Capsule  Refills: 0  Active, Disp: , Rfl:     fluticasone (FLONASE) 50 mcg/act nasal spray, Fluticasone Propionate 50 MCG/ACT Nasal Suspension  Quantity: 16;  Refills: 0   Started 29-Nov-2014 Active, Disp: , Rfl:     Magnesium 100 MG CAPS, Magnesium TABS  Refills: 0  Active, Disp: , Rfl:     Omega-3 Fatty Acids (FISH OIL) 645 MG CAPS, Fish Oil CAPS  Refills: 0  Active, Disp: , Rfl:     Potassium 95 MG TABS, Potassium TABS  Refills: 0  Active, Disp: , Rfl:     telmisartan (MICARDIS) 80 MG tablet, Micardis 80 MG Oral Tablet  Refills: 0  Active, Disp: , Rfl:      There is no problem list on file for this  patient.            Patient ID: Neisha Salazar is a 79 y.o. female.     HPI     The following portions of the patient's history were reviewed and updated as appropriate: allergies, current medications, past family history, past medical history, past social history, past surgical history and problem list.        Objective:  Patient's shoes and socks removed.   Foot ExamPhysical Exam             Physical Exam  Left Foot: Appearance: Normal except as noted: excessive supination\R\R\b0pes cavus. Tenderness: None except the great toe,\R\y6tkwebp longitudinal arch\R\R\n4nhmatbyuz of the plantar fascia.   Right Foot: Appearance: Normal except as noted: excessive supination\R\R\b0pes cavus. Tenderness: None except the medial longitudinal arch\R\R\j0lyqyuwpwk of the plantar fascia.   Left Ankle: ROM: limited ROM in all planes   Right Ankle: ROM: limited ROM in all planes   Neurological Exam: performed. Light touch was intact bilaterally. Vibratory sensation was intact bilaterally. Response to monofilament test was intact bilaterally. Deep tendon reflexes: patellar reflex present bilaterally\R\R\s8gwvznmdi reflex present bilaterally.   Vascular Exam: performed Dorsalis pedis pulses were diminished bilaterally. Posterior tibial pulses were diminished bilaterally. Elevation Pallor: present bilaterally. Capillary refill time was greater than 3 seconds bilaterally\R\R\o3Jwogp 8 findings bilateral negative digital hair noted all mycotic nails debrided today. Edema: mild bilaterally.  These are Q, 9 findings bilateral  Toenails: All of the toenails were elongated,\R\y8kssnrfcioypyb,\R\e6utchvahgno,\R\k2bdiivdf,\R\p0venjrv\R\R\r5Fuznxng.   Hyperkeratosis: present on both first sub metatarsals.   Shoe Gear Evaluation: Recommendation(s): custom inlays\R\R\b0SAS style.

## 2025-01-30 ENCOUNTER — RESULTS FOLLOW-UP (OUTPATIENT)
Dept: FAMILY MEDICINE CLINIC | Facility: CLINIC | Age: 80
End: 2025-01-30

## 2025-01-30 ENCOUNTER — APPOINTMENT (OUTPATIENT)
Dept: LAB | Facility: HOSPITAL | Age: 80
End: 2025-01-30
Attending: INTERNAL MEDICINE
Payer: MEDICARE

## 2025-01-30 DIAGNOSIS — Z13.0 SCREENING FOR DEFICIENCY ANEMIA: ICD-10-CM

## 2025-01-30 LAB
BASOPHILS # BLD AUTO: 0.03 THOUSANDS/ΜL (ref 0–0.1)
BASOPHILS NFR BLD AUTO: 1 % (ref 0–1)
EOSINOPHIL # BLD AUTO: 0.16 THOUSAND/ΜL (ref 0–0.61)
EOSINOPHIL NFR BLD AUTO: 3 % (ref 0–6)
ERYTHROCYTE [DISTWIDTH] IN BLOOD BY AUTOMATED COUNT: 14.6 % (ref 11.6–15.1)
HCT VFR BLD AUTO: 38 % (ref 34.8–46.1)
HGB BLD-MCNC: 12.1 G/DL (ref 11.5–15.4)
IMM GRANULOCYTES # BLD AUTO: 0.01 THOUSAND/UL (ref 0–0.2)
IMM GRANULOCYTES NFR BLD AUTO: 0 % (ref 0–2)
LYMPHOCYTES # BLD AUTO: 1.5 THOUSANDS/ΜL (ref 0.6–4.47)
LYMPHOCYTES NFR BLD AUTO: 28 % (ref 14–44)
MCH RBC QN AUTO: 29.7 PG (ref 26.8–34.3)
MCHC RBC AUTO-ENTMCNC: 31.8 G/DL (ref 31.4–37.4)
MCV RBC AUTO: 93 FL (ref 82–98)
MONOCYTES # BLD AUTO: 0.55 THOUSAND/ΜL (ref 0.17–1.22)
MONOCYTES NFR BLD AUTO: 10 % (ref 4–12)
NEUTROPHILS # BLD AUTO: 3.04 THOUSANDS/ΜL (ref 1.85–7.62)
NEUTS SEG NFR BLD AUTO: 58 % (ref 43–75)
NRBC BLD AUTO-RTO: 0 /100 WBCS
PLATELET # BLD AUTO: 272 THOUSANDS/UL (ref 149–390)
PMV BLD AUTO: 10.3 FL (ref 8.9–12.7)
RBC # BLD AUTO: 4.07 MILLION/UL (ref 3.81–5.12)
WBC # BLD AUTO: 5.29 THOUSAND/UL (ref 4.31–10.16)

## 2025-01-30 PROCEDURE — 85025 COMPLETE CBC W/AUTO DIFF WBC: CPT

## 2025-01-30 PROCEDURE — 36415 COLL VENOUS BLD VENIPUNCTURE: CPT

## 2025-03-05 ENCOUNTER — OFFICE VISIT (OUTPATIENT)
Dept: FAMILY MEDICINE CLINIC | Facility: CLINIC | Age: 80
End: 2025-03-05
Payer: MEDICARE

## 2025-03-05 VITALS
DIASTOLIC BLOOD PRESSURE: 72 MMHG | OXYGEN SATURATION: 98 % | HEART RATE: 97 BPM | HEIGHT: 62 IN | SYSTOLIC BLOOD PRESSURE: 127 MMHG | WEIGHT: 170.4 LBS | BODY MASS INDEX: 31.36 KG/M2

## 2025-03-05 DIAGNOSIS — E78.5 DYSLIPIDEMIA: ICD-10-CM

## 2025-03-05 DIAGNOSIS — E53.8 B12 DEFICIENCY: ICD-10-CM

## 2025-03-05 DIAGNOSIS — F32.A ANXIETY AND DEPRESSION: Primary | ICD-10-CM

## 2025-03-05 DIAGNOSIS — F41.9 ANXIETY AND DEPRESSION: Primary | ICD-10-CM

## 2025-03-05 DIAGNOSIS — E66.09 CLASS 1 OBESITY DUE TO EXCESS CALORIES WITH SERIOUS COMORBIDITY AND BODY MASS INDEX (BMI) OF 33.0 TO 33.9 IN ADULT: ICD-10-CM

## 2025-03-05 DIAGNOSIS — R55 NEAR SYNCOPE: ICD-10-CM

## 2025-03-05 DIAGNOSIS — M81.0 AGE-RELATED OSTEOPOROSIS WITHOUT CURRENT PATHOLOGICAL FRACTURE: ICD-10-CM

## 2025-03-05 DIAGNOSIS — E66.811 CLASS 1 OBESITY DUE TO EXCESS CALORIES WITH SERIOUS COMORBIDITY AND BODY MASS INDEX (BMI) OF 33.0 TO 33.9 IN ADULT: ICD-10-CM

## 2025-03-05 DIAGNOSIS — M15.0 PRIMARY OSTEOARTHRITIS INVOLVING MULTIPLE JOINTS: ICD-10-CM

## 2025-03-05 PROCEDURE — 99214 OFFICE O/P EST MOD 30 MIN: CPT | Performed by: INTERNAL MEDICINE

## 2025-03-05 PROCEDURE — G2211 COMPLEX E/M VISIT ADD ON: HCPCS | Performed by: INTERNAL MEDICINE

## 2025-03-05 NOTE — PROGRESS NOTES
Office Visit Note  25     Neisha Salazar 79 y.o. female MRN: [id number]  : 1945    Assessment:     1. Anxiety and depression  Assessment & Plan:  Patient takes Xanax occasionally counseled at length regarding stress management.  2. Age-related osteoporosis without current pathological fracture  Assessment & Plan:  Currently patient is taking calcium 1200 mg daily along with vitamin D3 2000 units daily patient could not tolerate Fosamax in the past and he is afraid to go on Prolia injections at present time will monitor for now continue with calcium and vitamin D  3. B12 deficiency  Assessment & Plan:  Continue vitamin B12  4. Class 1 obesity due to excess calories with serious comorbidity and body mass index (BMI) of 33.0 to 33.9 in adult  Assessment & Plan:      Discussed with patient regarding diet exercise lifestyle modification to lose weight current BMI is 31.17.  5. Dyslipidemia  Assessment & Plan:  Last cholesterol level is at 185  HDL 54 triglycerides 81 emphasized regarding diet no history of coronary artery disease no family history of CAD  6. Primary osteoarthritis involving multiple joints  7. Near syncope  Assessment & Plan:  No episodes of near syncope in the recent past.             Discussion Summary and Plan:  Today's care plan and medications were reviewed with patient in detail and all their questions answered to their satisfaction.    Chief Complaint   Patient presents with   • Follow-up      Subjective:  Patient is coming for a follow-up evaluation with regards to symptoms of anxiety and depression osteoporosis patient also with obesity history of hypertension uterovaginal prolapse and prediabetes.  She has been under a lot of stress in the recent past.  Her  has been diagnosed most likely with carcinoma of the lung being followed by the oncologist and surgeon.  Patient denies any symptoms of chest pain palpitation shortness of breath.  Medications reviewed labs  reviewed.        The following portions of the patient's history were reviewed and updated as appropriate: allergies, current medications, past family history, past medical history, past social history, past surgical history and problem list.    Review of Systems   Constitutional:  Negative for chills and fever.   HENT:  Negative for ear pain and sore throat.    Eyes:  Negative for pain and visual disturbance.   Respiratory:  Negative for cough and shortness of breath.    Cardiovascular:  Negative for chest pain and palpitations.   Gastrointestinal:  Negative for abdominal pain and vomiting.   Genitourinary:  Negative for dysuria and hematuria.   Musculoskeletal:  Negative for arthralgias and back pain.   Skin:  Negative for color change and rash.   Neurological:  Negative for seizures and syncope.   All other systems reviewed and are negative.        Historical Information   Patient Active Problem List   Diagnosis   • Primary hypertension   • Class 1 obesity due to excess calories in adult   • Dyslipidemia   • Primary osteoarthritis involving multiple joints   • Urinary frequency   • Muscle cramps   • Unsteady gait   • Anxiety and depression   • Other hemorrhoids   • Uterovaginal prolapse   • Urinary incontinence   • Near syncope   • Prediabetes   • Nocturia   • Rash   • Age-related osteoporosis without current pathological fracture   • B12 deficiency   • Leukopenia     Past Medical History:   Diagnosis Date   • Anxiety disorder    • Arthritis    • Bright red rectal bleeding    • Colon polyp    • DJD (degenerative joint disease)    • Hernia A year ago   • High cholesterol    • Hyperactivity of bladder    • Hypertension    • Irritable bowel syndrome Last July   • Macular degeneration    • Obesity    • Osteoporosis    • Plantar fasciitis     Not sure of date     Past Surgical History:   Procedure Laterality Date   • CARDIAC SURGERY     • CHOLECYSTECTOMY     • COLONOSCOPY     • DXA PROCEDURE (HISTORICAL)  06/23/2021    • INTRAOCULAR LENS INSERTION     • MAMMO (HISTORICAL)  01/04/2022   • TONSILLECTOMY     • TOTAL SHOULDER REPLACEMENT Left      Social History     Substance and Sexual Activity   Alcohol Use No     Social History     Substance and Sexual Activity   Drug Use Never     Social History     Tobacco Use   Smoking Status Former   • Current packs/day: 0.00   • Types: Cigarettes   • Passive exposure: Past   Smokeless Tobacco Never   Tobacco Comments    quit cigarettes age 27     Family History   Problem Relation Age of Onset   • Hearing loss Mother    • Esophageal cancer Father    • Hypertension Sister    • No Known Problems Sister    • Esophageal cancer Maternal Grandmother    • Diabetes Paternal Grandmother    • Breast cancer Maternal Aunt 70   • No Known Problems Maternal Aunt    • No Known Problems Maternal Aunt    • No Known Problems Maternal Aunt    • Skin cancer Paternal Aunt    • Melanoma Paternal Aunt         unknown   • No Known Problems Paternal Aunt    • No Known Problems Paternal Aunt    • Cancer Paternal Aunt    • Breast cancer Cousin 77     Health Maintenance Due   Topic   • Hepatitis C Screening    • Pneumococcal Vaccine: 65+ Years (1 of 1 - PCV)   • RSV Vaccine for Pregnant Patients and Patients Age 60+ Years (1 - 1-dose 75+ series)      Meds/Allergies       Current Outpatient Medications:   •  ALPRAZolam (XANAX) 0.25 mg tablet, Take 1 tablet (0.25 mg total) by mouth 2 (two) times a day as needed for anxiety, Disp: 40 tablet, Rfl: 0  •  aspirin (ECOTRIN LOW STRENGTH) 81 mg EC tablet, Take 81 mg by mouth daily, Disp: , Rfl:   •  Biotin 1000 MCG tablet, Take 1,000 mcg by mouth daily, Disp: , Rfl:   •  Calcium Acetate, Phos Binder, (CALCIUM ACETATE PO), Take 2 tablets by mouth daily CALCIUM CARBONATE/VITAMIN D3 (CALCIUM 600 WITH VITAMIN D3 ORAL), Disp: , Rfl:   •  Cranberry 200 MG CAPS, Take by mouth in the morning, Disp: , Rfl:   •  hydrocortisone (ANUSOL-HC) 2.5 % rectal cream, Apply topically 2 (two) times  "a day as needed for hemorrhoids, Disp: 28 g, Rfl: 2  •  Multiple Vitamins-Minerals (PRESERVISION AREDS 2 PO), Take by mouth 2 (two) times a day, Disp: , Rfl:   •  vitamin B-12 (VITAMIN B-12) 1,000 mcg tablet, Take 1 tablet (1,000 mcg total) by mouth daily, Disp: 30 tablet, Rfl: 2      Objective:    Vitals:   /72 (BP Location: Right arm, Patient Position: Sitting, Cuff Size: Large)   Pulse 97   Ht 5' 2\" (1.575 m)   Wt 77.3 kg (170 lb 6.4 oz)   SpO2 98%   BMI 31.17 kg/m²   Body mass index is 31.17 kg/m².  Vitals:    03/05/25 1249   Weight: 77.3 kg (170 lb 6.4 oz)       Physical Exam  Vitals and nursing note reviewed.   Constitutional:       Appearance: Normal appearance. She is obese.   Cardiovascular:      Rate and Rhythm: Normal rate and regular rhythm.      Heart sounds: Normal heart sounds.   Pulmonary:      Effort: Pulmonary effort is normal.      Breath sounds: Normal breath sounds.   Abdominal:      Palpations: Abdomen is soft.   Musculoskeletal:         General: Normal range of motion.      Cervical back: Normal range of motion and neck supple.      Right lower leg: No edema.      Left lower leg: No edema.   Skin:     General: Skin is warm and dry.   Neurological:      General: No focal deficit present.      Mental Status: She is alert and oriented to person, place, and time.   Psychiatric:         Mood and Affect: Mood normal.         Behavior: Behavior normal.         Lab Review   Appointment on 01/30/2025   Component Date Value Ref Range Status   • WBC 01/30/2025 5.29  4.31 - 10.16 Thousand/uL Final   • RBC 01/30/2025 4.07  3.81 - 5.12 Million/uL Final   • Hemoglobin 01/30/2025 12.1  11.5 - 15.4 g/dL Final   • Hematocrit 01/30/2025 38.0  34.8 - 46.1 % Final   • MCV 01/30/2025 93  82 - 98 fL Final   • MCH 01/30/2025 29.7  26.8 - 34.3 pg Final   • MCHC 01/30/2025 31.8  31.4 - 37.4 g/dL Final   • RDW 01/30/2025 14.6  11.6 - 15.1 % Final   • MPV 01/30/2025 10.3  8.9 - 12.7 fL Final   • Platelets " "01/30/2025 272  149 - 390 Thousands/uL Final   • nRBC 01/30/2025 0  /100 WBCs Final   • Segmented % 01/30/2025 58  43 - 75 % Final   • Immature Grans % 01/30/2025 0  0 - 2 % Final   • Lymphocytes % 01/30/2025 28  14 - 44 % Final   • Monocytes % 01/30/2025 10  4 - 12 % Final   • Eosinophils Relative 01/30/2025 3  0 - 6 % Final   • Basophils Relative 01/30/2025 1  0 - 1 % Final   • Absolute Neutrophils 01/30/2025 3.04  1.85 - 7.62 Thousands/µL Final   • Absolute Immature Grans 01/30/2025 0.01  0.00 - 0.20 Thousand/uL Final   • Absolute Lymphocytes 01/30/2025 1.50  0.60 - 4.47 Thousands/µL Final   • Absolute Monocytes 01/30/2025 0.55  0.17 - 1.22 Thousand/µL Final   • Eosinophils Absolute 01/30/2025 0.16  0.00 - 0.61 Thousand/µL Final   • Basophils Absolute 01/30/2025 0.03  0.00 - 0.10 Thousands/µL Final         Tia Warren MD        \"This note has been constructed using a voice recognition system.Therefore there may be syntax, spelling, and/or grammatical errors. Please call if you have any questions. \"  "

## 2025-03-05 NOTE — ASSESSMENT & PLAN NOTE
Currently patient is taking calcium 1200 mg daily along with vitamin D3 2000 units daily patient could not tolerate Fosamax in the past and he is afraid to go on Prolia injections at present time will monitor for now continue with calcium and vitamin D

## 2025-03-05 NOTE — ASSESSMENT & PLAN NOTE
Last cholesterol level is at 185  HDL 54 triglycerides 81 emphasized regarding diet no history of coronary artery disease no family history of CAD

## 2025-03-05 NOTE — ASSESSMENT & PLAN NOTE
Discussed with patient regarding diet exercise lifestyle modification to lose weight current BMI is 31.17.

## 2025-04-04 NOTE — PROGRESS NOTES
Name: Neisha Salazar      : 1945      MRN: [id number]  Encounter Provider: Tia Warren MD  Encounter Date: 2025   Encounter department: [facility]  :  Assessment & Plan  Age-related osteoporosis without current pathological fracture  Patient is afraid to go on any medications orally on injection at present time she could not tolerate Fosamax in the past currently she is taking calcium and vitamin D continue with the same.       Primary hypertension  Patient blood pressure 124/70 3 repeat 1 later when she was has been feeling very stressed discussing you have a Sarah condition it went up to 140 systolic will monitor for now patient not on any medication       Prediabetes  Follow-up with repeat hemoglobin A1c last 1 was at 5.7       Anxiety and depression  Patient has been under a lot of stress in the recent past with her  carcinoma of the lung diagnosis and radiation treatment continue Xanax as needed.       B12 deficiency  Patient is on vitamin B12 1000 mcg daily will follow-up with repeat B12 level  Orders:  •  Vitamin B12; Future    Class 1 obesity due to excess calories with serious comorbidity and body mass index (BMI) of 31.0 to 31.9 in adult      BMI is 35.06 slightly less compared to before emphasized regarding diet exercise lifestyle modification to lose weight.       Dyslipidemia  Last lipid profile shows cholesterol 185 LDL at 115 HDL 54 triglycerides 81 we will follow-up with repeat lab  Orders:  •  Lipid panel; Future    Other hemorrhoids  Hemorrhoids still bother her on and off she takes MiraLAX to keep the stool soft       Uterovaginal prolapse  Follow-up with the urogynecologist       Screening for thyroid disorder    Orders:  •  TSH, 3rd generation with Free T4 reflex; Future    Screening for deficiency anemia    Orders:  •  CBC and differential; Future    Pre-diabetes    Orders:  •  Comprehensive metabolic panel; Future  •  Hemoglobin A1C; Future  •  UA  "w Reflex to Microscopic w Reflex to Culture; Future    Encounter for immunization    Orders:  •  Pneumococcal Conjugate Vaccine 20-valent (Pcv20)           History of Present Illness   Patient is coming here for a follow-up evaluation with a history of osteoporosis, hemorrhoids, DJD.  Patient has been under a lot of stress in the recent past taking care of her  who is undergoing radiation treatment for   carcinoma of the lung.  Patient denies any symptoms of chest pain shortness of breath palpitations.    Medications reviewed labs reviewed.  Currently not on any prescription medication except Xanax which she takes occasionally for anxiety symptoms patient was on blood pressure medication in the past but her pressures were running decent without the medication.  Today it is 124/70 3 repeat 1 came back at 140 systolic when she was very anxious.      Review of Systems   Constitutional:  Negative for chills and fever.   HENT:  Negative for ear pain and sore throat.    Eyes:  Negative for pain and visual disturbance.   Respiratory:  Negative for cough and shortness of breath.    Cardiovascular:  Negative for chest pain and palpitations.   Gastrointestinal:  Negative for abdominal pain and vomiting.   Genitourinary:  Negative for dysuria and hematuria.   Musculoskeletal:  Positive for arthralgias. Negative for back pain.   Skin:  Negative for color change and rash.   Neurological:  Negative for seizures and syncope.   Psychiatric/Behavioral:  The patient is nervous/anxious.    All other systems reviewed and are negative.      Objective   /73 (BP Location: Right arm, Patient Position: Sitting, Cuff Size: Standard)   Pulse 97   Ht 5' 2\" (1.575 m)   Wt 77 kg (169 lb 12.8 oz)   SpO2 98%   BMI 31.06 kg/m²      Physical Exam  Vitals and nursing note reviewed.   Constitutional:       General: She is not in acute distress.     Appearance: She is well-developed.   HENT:      Head: Normocephalic and atraumatic. "   Eyes:      Conjunctiva/sclera: Conjunctivae normal.   Cardiovascular:      Rate and Rhythm: Normal rate and regular rhythm.      Heart sounds: No murmur heard.  Pulmonary:      Effort: Pulmonary effort is normal. No respiratory distress.      Breath sounds: Normal breath sounds.   Abdominal:      Palpations: Abdomen is soft.      Tenderness: There is no abdominal tenderness.   Musculoskeletal:         General: No swelling.      Cervical back: Neck supple.   Skin:     General: Skin is warm and dry.      Capillary Refill: Capillary refill takes less than 2 seconds.   Neurological:      Mental Status: She is alert.   Psychiatric:         Mood and Affect: Mood normal.

## 2025-04-07 ENCOUNTER — OFFICE VISIT (OUTPATIENT)
Dept: FAMILY MEDICINE CLINIC | Facility: CLINIC | Age: 80
End: 2025-04-07
Payer: MEDICARE

## 2025-04-07 VITALS
WEIGHT: 169.8 LBS | HEIGHT: 62 IN | OXYGEN SATURATION: 98 % | HEART RATE: 97 BPM | DIASTOLIC BLOOD PRESSURE: 73 MMHG | SYSTOLIC BLOOD PRESSURE: 124 MMHG | BODY MASS INDEX: 31.25 KG/M2

## 2025-04-07 DIAGNOSIS — E66.09 CLASS 1 OBESITY DUE TO EXCESS CALORIES WITH SERIOUS COMORBIDITY AND BODY MASS INDEX (BMI) OF 31.0 TO 31.9 IN ADULT: ICD-10-CM

## 2025-04-07 DIAGNOSIS — R73.03 PRE-DIABETES: ICD-10-CM

## 2025-04-07 DIAGNOSIS — Z23 ENCOUNTER FOR IMMUNIZATION: ICD-10-CM

## 2025-04-07 DIAGNOSIS — E78.5 DYSLIPIDEMIA: ICD-10-CM

## 2025-04-07 DIAGNOSIS — K64.8 OTHER HEMORRHOIDS: ICD-10-CM

## 2025-04-07 DIAGNOSIS — I10 PRIMARY HYPERTENSION: ICD-10-CM

## 2025-04-07 DIAGNOSIS — Z13.0 SCREENING FOR DEFICIENCY ANEMIA: ICD-10-CM

## 2025-04-07 DIAGNOSIS — N81.4 UTEROVAGINAL PROLAPSE: ICD-10-CM

## 2025-04-07 DIAGNOSIS — E66.811 CLASS 1 OBESITY DUE TO EXCESS CALORIES WITH SERIOUS COMORBIDITY AND BODY MASS INDEX (BMI) OF 31.0 TO 31.9 IN ADULT: ICD-10-CM

## 2025-04-07 DIAGNOSIS — M81.0 AGE-RELATED OSTEOPOROSIS WITHOUT CURRENT PATHOLOGICAL FRACTURE: Primary | ICD-10-CM

## 2025-04-07 DIAGNOSIS — E53.8 B12 DEFICIENCY: ICD-10-CM

## 2025-04-07 DIAGNOSIS — R73.03 PREDIABETES: ICD-10-CM

## 2025-04-07 DIAGNOSIS — F41.9 ANXIETY AND DEPRESSION: ICD-10-CM

## 2025-04-07 DIAGNOSIS — Z13.29 SCREENING FOR THYROID DISORDER: ICD-10-CM

## 2025-04-07 DIAGNOSIS — F32.A ANXIETY AND DEPRESSION: ICD-10-CM

## 2025-04-07 PROCEDURE — G0009 ADMIN PNEUMOCOCCAL VACCINE: HCPCS

## 2025-04-07 PROCEDURE — G2211 COMPLEX E/M VISIT ADD ON: HCPCS | Performed by: INTERNAL MEDICINE

## 2025-04-07 PROCEDURE — 90677 PCV20 VACCINE IM: CPT

## 2025-04-07 PROCEDURE — 99214 OFFICE O/P EST MOD 30 MIN: CPT | Performed by: INTERNAL MEDICINE

## 2025-04-07 NOTE — ASSESSMENT & PLAN NOTE
Last lipid profile shows cholesterol 185 LDL at 115 HDL 54 triglycerides 81 we will follow-up with repeat lab  Orders:  •  Lipid panel; Future

## 2025-04-07 NOTE — ASSESSMENT & PLAN NOTE
Patient is on vitamin B12 1000 mcg daily will follow-up with repeat B12 level  Orders:  •  Vitamin B12; Future

## 2025-04-07 NOTE — ASSESSMENT & PLAN NOTE
Patient blood pressure 124/70 3 repeat 1 later when she was has been feeling very stressed discussing you have a Sarah condition it went up to 140 systolic will monitor for now patient not on any medication

## 2025-04-07 NOTE — ASSESSMENT & PLAN NOTE
BMI is 35.06 slightly less compared to before emphasized regarding diet exercise lifestyle modification to lose weight.

## 2025-04-07 NOTE — ASSESSMENT & PLAN NOTE
Patient has been under a lot of stress in the recent past with her  carcinoma of the lung diagnosis and radiation treatment continue Xanax as needed.

## 2025-04-07 NOTE — ASSESSMENT & PLAN NOTE
Lab Results   Component Value Date    HGBA1C 8.1 (H) 12/30/2023     HGA1C above goal, possibly higher as patient has not been taking insulin regularly, skipping doses of victoza and not checking glucose levels. Discussed risks of poorly controlled diabetes mellitus in detail at today's appointment and the importance of adherence to his medication regimen.     Discussed risks/complications associated with uncontrolled diabetes including organ involvement, heart attack, stroke, death.    Advised lifestyle modifications including attention to diet including the amount and types of carbohydrates consumed and regular activity.     Call for blood sugars less than 70 mg/dl or patterns over 250 mg/dl.     Discussed use of CGM to collect additional blood glucose data to reveal patterns that can be used to improve glucose levels and adjust insulin regimen.    Discussed symptoms and treatment of hypoglycemia.  Reviewed risks associated with hypoglycemia. Always carry rapid acting carbohydrates and a glucometer (a way to check your blood sugar).    Recommendation for medical identification either bracelet, necklace.      Routine follow up for diabetic eye and foot exams.     Ordered blood work to complete prior to next visit.    Send glucose logs/CGM download in 1-2 weeks for review    Follow up in 3 months.      Patient is afraid to go on any medications orally on injection at present time she could not tolerate Fosamax in the past currently she is taking calcium and vitamin D continue with the same.

## 2025-04-21 ENCOUNTER — PROCEDURE VISIT (OUTPATIENT)
Age: 80
End: 2025-04-21
Payer: MEDICARE

## 2025-04-21 VITALS — RESPIRATION RATE: 16 BRPM | BODY MASS INDEX: 31.1 KG/M2 | HEART RATE: 97 BPM | HEIGHT: 62 IN | WEIGHT: 169 LBS

## 2025-04-21 DIAGNOSIS — M79.671 PAIN IN BOTH FEET: ICD-10-CM

## 2025-04-21 DIAGNOSIS — I70.209 PERIPHERAL ARTERIOSCLEROSIS (HCC): Primary | ICD-10-CM

## 2025-04-21 DIAGNOSIS — M79.672 PAIN IN BOTH FEET: ICD-10-CM

## 2025-04-21 DIAGNOSIS — B35.1 ONYCHOMYCOSIS: ICD-10-CM

## 2025-04-21 PROCEDURE — RECHECK: Performed by: PODIATRIST

## 2025-04-21 PROCEDURE — 11721 DEBRIDE NAIL 6 OR MORE: CPT | Performed by: PODIATRIST

## 2025-04-21 NOTE — PROGRESS NOTES
Assessment/Plan:  Pain.  Mycosis of nail.  Paronychia.  Peripheral artery disease.     Plan.  Chart reviewed.  Foot exam performed.  Patient educated on condition.  All mycotic nails debrided without pain or complication.  Nails debrided mechanically with nail nipper.  Aftercare instruction given.  Return for follow-up     Subjective:   Patient complains of pain in her feet with ambulation.  No history of trauma.             Past Medical History:   Diagnosis Date    Hypertension      Macular degeneration                     Past Surgical History:   Procedure Laterality Date    INTRAOCULAR LENS INSERTION                       Allergies   Allergen Reactions    Atorvastatin Other (See Comments)       legs get stiff    Zithromax [Azithromycin]      Erythromycin Base Palpitations            Current Outpatient Prescriptions:     Calcium Carb-Cholecalciferol (CALCIUM 1000 + D) 1000-800 MG-UNIT TABS, Calcium TABS  Refills: 0  Active, Disp: , Rfl:     cholecalciferol (VITAMIN D3) 1,000 units tablet, Vitamin D TABS  Refills: 0  Active, Disp: , Rfl:     Cinnamon 500 MG capsule, Cinnamon CAPS  Refills: 0  Active, Disp: , Rfl:     cycloSPORINE (RESTASIS) 0.05 % ophthalmic emulsion, Restasis 0.05 % Ophthalmic Emulsion  Refills: 0  Active, Disp: , Rfl:     Flaxseed, Linseed, (FLAX SEED OIL) 1000 MG CAPS, Flax Seed Oil 1000 MG Oral Capsule  Refills: 0  Active, Disp: , Rfl:     fluticasone (FLONASE) 50 mcg/act nasal spray, Fluticasone Propionate 50 MCG/ACT Nasal Suspension  Quantity: 16;  Refills: 0   Started 29-Nov-2014 Active, Disp: , Rfl:     Magnesium 100 MG CAPS, Magnesium TABS  Refills: 0  Active, Disp: , Rfl:     Omega-3 Fatty Acids (FISH OIL) 645 MG CAPS, Fish Oil CAPS  Refills: 0  Active, Disp: , Rfl:     Potassium 95 MG TABS, Potassium TABS  Refills: 0  Active, Disp: , Rfl:     telmisartan (MICARDIS) 80 MG tablet, Micardis 80 MG Oral Tablet  Refills: 0  Active, Disp: , Rfl:      There is no problem list on file for this  patient.            Patient ID: Neisha Salazar is a 80 y.o. female.     HPI     The following portions of the patient's history were reviewed and updated as appropriate: allergies, current medications, past family history, past medical history, past social history, past surgical history and problem list.        Objective:  Patient's shoes and socks removed.   Foot ExamPhysical Exam             Physical Exam  Left Foot: Appearance: Normal except as noted: excessive supination\R\R\b0pes cavus. Tenderness: None except the great toe,\R\m7ohprpc longitudinal arch\R\R\b2xadaykbab of the plantar fascia.   Right Foot: Appearance: Normal except as noted: excessive supination\R\R\b0pes cavus. Tenderness: None except the medial longitudinal arch\R\R\e6tlzcotkfu of the plantar fascia.   Left Ankle: ROM: limited ROM in all planes   Right Ankle: ROM: limited ROM in all planes   Neurological Exam: performed. Light touch was intact bilaterally. Vibratory sensation was intact bilaterally. Response to monofilament test was intact bilaterally. Deep tendon reflexes: patellar reflex present bilaterally\R\R\f8dmyhanpm reflex present bilaterally.   Vascular Exam: performed Dorsalis pedis pulses were diminished bilaterally. Posterior tibial pulses were diminished bilaterally. Elevation Pallor: present bilaterally. Capillary refill time was greater than 3 seconds bilaterally\R\R\e5Aypff 8 findings bilateral negative digital hair noted all mycotic nails debrided today. Edema: mild bilaterally.  These are Q, 9 findings bilateral  Toenails: All of the toenails were elongated,\R\z9ypwhpalfwaysu,\R\s8dmbvjeuwib,\R\v6rewnbfu,\R\v5dneaia\R\R\t1Mtfibyc.   Hyperkeratosis: present on both first sub metatarsals.   Shoe Gear Evaluation: Recommendation(s): custom inlays\R\R\b0SAS style.

## 2025-07-01 ENCOUNTER — APPOINTMENT (OUTPATIENT)
Dept: LAB | Facility: HOSPITAL | Age: 80
End: 2025-07-01
Attending: INTERNAL MEDICINE
Payer: MEDICARE

## 2025-07-01 DIAGNOSIS — Z13.29 SCREENING FOR THYROID DISORDER: ICD-10-CM

## 2025-07-01 DIAGNOSIS — R73.03 PRE-DIABETES: ICD-10-CM

## 2025-07-01 DIAGNOSIS — E53.8 B12 DEFICIENCY: ICD-10-CM

## 2025-07-01 DIAGNOSIS — Z13.0 SCREENING FOR DEFICIENCY ANEMIA: ICD-10-CM

## 2025-07-01 DIAGNOSIS — E78.5 DYSLIPIDEMIA: ICD-10-CM

## 2025-07-01 LAB
ALBUMIN SERPL BCG-MCNC: 3.5 G/DL (ref 3.5–5)
ALP SERPL-CCNC: 50 U/L (ref 34–104)
ALT SERPL W P-5'-P-CCNC: 14 U/L (ref 7–52)
ANION GAP SERPL CALCULATED.3IONS-SCNC: 5 MMOL/L (ref 4–13)
AST SERPL W P-5'-P-CCNC: 9 U/L (ref 13–39)
BACTERIA UR QL AUTO: ABNORMAL /HPF
BASOPHILS # BLD AUTO: 0.04 THOUSANDS/ÂΜL (ref 0–0.1)
BASOPHILS NFR BLD AUTO: 1 % (ref 0–1)
BILIRUB SERPL-MCNC: 0.55 MG/DL (ref 0.2–1)
BILIRUB UR QL STRIP: NEGATIVE
BUN SERPL-MCNC: 13 MG/DL (ref 5–25)
CALCIUM SERPL-MCNC: 8.8 MG/DL (ref 8.4–10.2)
CHLORIDE SERPL-SCNC: 104 MMOL/L (ref 96–108)
CHOLEST SERPL-MCNC: 175 MG/DL (ref ?–200)
CLARITY UR: CLEAR
CO2 SERPL-SCNC: 28 MMOL/L (ref 21–32)
COLOR UR: ABNORMAL
CREAT SERPL-MCNC: 0.75 MG/DL (ref 0.6–1.3)
EOSINOPHIL # BLD AUTO: 0.13 THOUSAND/ÂΜL (ref 0–0.61)
EOSINOPHIL NFR BLD AUTO: 3 % (ref 0–6)
ERYTHROCYTE [DISTWIDTH] IN BLOOD BY AUTOMATED COUNT: 14.4 % (ref 11.6–15.1)
EST. AVERAGE GLUCOSE BLD GHB EST-MCNC: 117 MG/DL
GFR SERPL CREATININE-BSD FRML MDRD: 76 ML/MIN/1.73SQ M
GLUCOSE P FAST SERPL-MCNC: 97 MG/DL (ref 65–99)
GLUCOSE UR STRIP-MCNC: NEGATIVE MG/DL
HBA1C MFR BLD: 5.7 %
HCT VFR BLD AUTO: 38.7 % (ref 34.8–46.1)
HDLC SERPL-MCNC: 53 MG/DL
HGB BLD-MCNC: 12.3 G/DL (ref 11.5–15.4)
HGB UR QL STRIP.AUTO: NEGATIVE
IMM GRANULOCYTES # BLD AUTO: 0.02 THOUSAND/UL (ref 0–0.2)
IMM GRANULOCYTES NFR BLD AUTO: 0 % (ref 0–2)
KETONES UR STRIP-MCNC: NEGATIVE MG/DL
LDLC SERPL CALC-MCNC: 108 MG/DL (ref 0–100)
LEUKOCYTE ESTERASE UR QL STRIP: ABNORMAL
LYMPHOCYTES # BLD AUTO: 1.56 THOUSANDS/ÂΜL (ref 0.6–4.47)
LYMPHOCYTES NFR BLD AUTO: 30 % (ref 14–44)
MCH RBC QN AUTO: 29.7 PG (ref 26.8–34.3)
MCHC RBC AUTO-ENTMCNC: 31.8 G/DL (ref 31.4–37.4)
MCV RBC AUTO: 94 FL (ref 82–98)
MONOCYTES # BLD AUTO: 0.47 THOUSAND/ÂΜL (ref 0.17–1.22)
MONOCYTES NFR BLD AUTO: 9 % (ref 4–12)
NEUTROPHILS # BLD AUTO: 2.94 THOUSANDS/ÂΜL (ref 1.85–7.62)
NEUTS SEG NFR BLD AUTO: 57 % (ref 43–75)
NITRITE UR QL STRIP: NEGATIVE
NON-SQ EPI CELLS URNS QL MICRO: ABNORMAL /HPF
NONHDLC SERPL-MCNC: 122 MG/DL
NRBC BLD AUTO-RTO: 0 /100 WBCS
PH UR STRIP.AUTO: 8 [PH]
PLATELET # BLD AUTO: 269 THOUSANDS/UL (ref 149–390)
PMV BLD AUTO: 10.4 FL (ref 8.9–12.7)
POTASSIUM SERPL-SCNC: 3.9 MMOL/L (ref 3.5–5.3)
PROT SERPL-MCNC: 6.2 G/DL (ref 6.4–8.4)
PROT UR STRIP-MCNC: NEGATIVE MG/DL
RBC # BLD AUTO: 4.14 MILLION/UL (ref 3.81–5.12)
RBC #/AREA URNS AUTO: ABNORMAL /HPF
SODIUM SERPL-SCNC: 137 MMOL/L (ref 135–147)
SP GR UR STRIP.AUTO: 1.01 (ref 1–1.03)
TRIGL SERPL-MCNC: 68 MG/DL (ref ?–150)
TSH SERPL DL<=0.05 MIU/L-ACNC: 1.19 UIU/ML (ref 0.45–4.5)
UROBILINOGEN UR STRIP-ACNC: <2 MG/DL
VIT B12 SERPL-MCNC: 898 PG/ML (ref 180–914)
WBC # BLD AUTO: 5.16 THOUSAND/UL (ref 4.31–10.16)
WBC #/AREA URNS AUTO: ABNORMAL /HPF

## 2025-07-01 PROCEDURE — 80053 COMPREHEN METABOLIC PANEL: CPT

## 2025-07-01 PROCEDURE — 80061 LIPID PANEL: CPT

## 2025-07-01 PROCEDURE — 85025 COMPLETE CBC W/AUTO DIFF WBC: CPT

## 2025-07-01 PROCEDURE — 36415 COLL VENOUS BLD VENIPUNCTURE: CPT

## 2025-07-01 PROCEDURE — 83036 HEMOGLOBIN GLYCOSYLATED A1C: CPT

## 2025-07-01 PROCEDURE — 84443 ASSAY THYROID STIM HORMONE: CPT

## 2025-07-01 PROCEDURE — 81001 URINALYSIS AUTO W/SCOPE: CPT

## 2025-07-01 PROCEDURE — 82607 VITAMIN B-12: CPT

## 2025-07-07 ENCOUNTER — OFFICE VISIT (OUTPATIENT)
Age: 80
End: 2025-07-07
Payer: MEDICARE

## 2025-07-07 ENCOUNTER — PROCEDURE VISIT (OUTPATIENT)
Age: 80
End: 2025-07-07
Payer: MEDICARE

## 2025-07-07 VITALS — WEIGHT: 159 LBS | HEIGHT: 62 IN | RESPIRATION RATE: 16 BRPM | BODY MASS INDEX: 29.26 KG/M2

## 2025-07-07 VITALS
BODY MASS INDEX: 29.26 KG/M2 | HEIGHT: 62 IN | OXYGEN SATURATION: 98 % | HEART RATE: 92 BPM | SYSTOLIC BLOOD PRESSURE: 122 MMHG | DIASTOLIC BLOOD PRESSURE: 73 MMHG | WEIGHT: 159 LBS

## 2025-07-07 DIAGNOSIS — B35.1 ONYCHOMYCOSIS: ICD-10-CM

## 2025-07-07 DIAGNOSIS — M15.0 PRIMARY OSTEOARTHRITIS INVOLVING MULTIPLE JOINTS: ICD-10-CM

## 2025-07-07 DIAGNOSIS — M79.671 PAIN IN BOTH FEET: ICD-10-CM

## 2025-07-07 DIAGNOSIS — F32.A ANXIETY AND DEPRESSION: ICD-10-CM

## 2025-07-07 DIAGNOSIS — N81.4 UTEROVAGINAL PROLAPSE: ICD-10-CM

## 2025-07-07 DIAGNOSIS — F41.9 ANXIETY AND DEPRESSION: ICD-10-CM

## 2025-07-07 DIAGNOSIS — E66.09 CLASS 1 OBESITY DUE TO EXCESS CALORIES WITH SERIOUS COMORBIDITY AND BODY MASS INDEX (BMI) OF 31.0 TO 31.9 IN ADULT: ICD-10-CM

## 2025-07-07 DIAGNOSIS — E66.811 CLASS 1 OBESITY DUE TO EXCESS CALORIES WITH SERIOUS COMORBIDITY AND BODY MASS INDEX (BMI) OF 31.0 TO 31.9 IN ADULT: ICD-10-CM

## 2025-07-07 DIAGNOSIS — K59.09 OTHER CONSTIPATION: ICD-10-CM

## 2025-07-07 DIAGNOSIS — E53.8 B12 DEFICIENCY: ICD-10-CM

## 2025-07-07 DIAGNOSIS — E78.5 DYSLIPIDEMIA: ICD-10-CM

## 2025-07-07 DIAGNOSIS — R73.03 PREDIABETES: ICD-10-CM

## 2025-07-07 DIAGNOSIS — M81.0 AGE-RELATED OSTEOPOROSIS WITHOUT CURRENT PATHOLOGICAL FRACTURE: Primary | ICD-10-CM

## 2025-07-07 DIAGNOSIS — I10 PRIMARY HYPERTENSION: ICD-10-CM

## 2025-07-07 DIAGNOSIS — I70.209 PERIPHERAL ARTERIOSCLEROSIS (HCC): Primary | ICD-10-CM

## 2025-07-07 DIAGNOSIS — R35.0 URINARY FREQUENCY: ICD-10-CM

## 2025-07-07 DIAGNOSIS — M79.672 PAIN IN BOTH FEET: ICD-10-CM

## 2025-07-07 PROCEDURE — G2211 COMPLEX E/M VISIT ADD ON: HCPCS | Performed by: INTERNAL MEDICINE

## 2025-07-07 PROCEDURE — 11721 DEBRIDE NAIL 6 OR MORE: CPT | Performed by: PODIATRIST

## 2025-07-07 PROCEDURE — 99214 OFFICE O/P EST MOD 30 MIN: CPT | Performed by: INTERNAL MEDICINE

## 2025-07-07 PROCEDURE — RECHECK: Performed by: PODIATRIST

## 2025-07-07 NOTE — PROGRESS NOTES
Name: Neisha Salazar      : 1945      MRN: [id number]  Encounter Provider: Tia Warren MD  Encounter Date: 2025   Encounter department: [facility]  :  Assessment & Plan  Age-related osteoporosis without current pathological fracture  Patient with osteoporosis could not tolerate Fosamax in the past opted to go on any other medication at present time concerned about the side effects.  Currently she is taking calcium and vitamin D will continue with the same last DEXA scan was done 2023 the next one will be in 2025       Anxiety and depression  Has been under a lot of stress with her  having the diagnosis of carcinoma of the lung occasionally she takes Xanax       Urinary frequency  Patient with increased frequency with urination recommend reevaluation by the urogynecologist regarding uterovaginal prolapse.  Urine analysis had shown few bacteria but patient has no symptoms of burning sensation but she does have increased frequency with urination especially in the nighttime I have prescribed her Vesicare in the past but she had not taken it again she is concerned about the side effects.  Follow-up with the urogynecologist       B12 deficiency  Vitamin B12 level is 898 stable currently taking B12 supplements.       Class 1 obesity due to excess calories with serious comorbidity and body mass index (BMI) of 31.0 to 31.9 in adult      Patient lost about 10 pounds in last 4 months time she has been cutting back on her calorie intake gradually her BMI is now 29.08 it was 35.06 before       Other constipation  Patient with constipation issues sometimes passing very hard stools recommend patient to take Colace 100 mg daily if necessary increase it to 2 capsules daily.  Recommend patient drink more water also       Dyslipidemia  Lipid profile shows cholesterol 175 triglycerides 68 HDL 53  emphasized continue with a low-cholesterol diet       Prediabetes  Lab  "reveals hemoglobin A1c same at 5.7 again emphasized regarding cutting back carbohydrate intake       Primary hypertension  Blood pressure is stable 122/73 currently not on any medications       Primary osteoarthritis involving multiple joints  Symptomatic treatment Tylenol as needed       Uterovaginal prolapse  Recommend reevaluation with a urogynecologist              History of Present Illness   Patient is coming for evaluation regarding symptoms of having difficulty with bowel movements at times, complains of tired feeling easily.  Denies any symptoms of chest pain palpitation shortness of breath.  Medications reviewed labs reviewed discussed with patient.  At times patient feels lightheadedness no orthostatic changes noted      Review of Systems   Constitutional:  Negative for chills and fever.   HENT:  Negative for ear pain and sore throat.    Eyes:  Negative for pain and visual disturbance.   Respiratory:  Negative for cough and shortness of breath.    Cardiovascular:  Negative for chest pain and palpitations.   Gastrointestinal:  Negative for abdominal pain and vomiting.   Genitourinary:  Negative for dysuria and hematuria.   Musculoskeletal:  Positive for arthralgias. Negative for back pain.   Skin:  Negative for color change and rash.   Neurological:  Positive for light-headedness. Negative for seizures and syncope.   All other systems reviewed and are negative.      Objective   /73 (BP Location: Right arm, Patient Position: Sitting, Cuff Size: Standard)   Pulse 92   Ht 5' 2\" (1.575 m)   Wt 72.1 kg (159 lb)   SpO2 98%   BMI 29.08 kg/m²      Physical Exam  Vitals and nursing note reviewed.   Constitutional:       General: She is not in acute distress.     Appearance: She is well-developed.   HENT:      Head: Normocephalic and atraumatic.     Eyes:      Conjunctiva/sclera: Conjunctivae normal.       Cardiovascular:      Rate and Rhythm: Normal rate and regular rhythm.      Heart sounds: No murmur " heard.  Pulmonary:      Effort: Pulmonary effort is normal. No respiratory distress.      Breath sounds: Normal breath sounds.   Abdominal:      Palpations: Abdomen is soft.      Tenderness: There is no abdominal tenderness.     Musculoskeletal:         General: No swelling.      Cervical back: Neck supple.     Skin:     General: Skin is warm and dry.      Capillary Refill: Capillary refill takes less than 2 seconds.     Neurological:      Mental Status: She is alert and oriented to person, place, and time.     Psychiatric:         Mood and Affect: Mood normal.         Behavior: Behavior normal.       Recent Results (from the past 8 weeks)   Vitamin B12    Collection Time: 07/01/25 10:36 AM   Result Value Ref Range    Vitamin B-12 898 180 - 914 pg/mL   CBC and differential    Collection Time: 07/01/25 10:36 AM   Result Value Ref Range    WBC 5.16 4.31 - 10.16 Thousand/uL    RBC 4.14 3.81 - 5.12 Million/uL    Hemoglobin 12.3 11.5 - 15.4 g/dL    Hematocrit 38.7 34.8 - 46.1 %    MCV 94 82 - 98 fL    MCH 29.7 26.8 - 34.3 pg    MCHC 31.8 31.4 - 37.4 g/dL    RDW 14.4 11.6 - 15.1 %    MPV 10.4 8.9 - 12.7 fL    Platelets 269 149 - 390 Thousands/uL    nRBC 0 /100 WBCs    Segmented % 57 43 - 75 %    Immature Grans % 0 0 - 2 %    Lymphocytes % 30 14 - 44 %    Monocytes % 9 4 - 12 %    Eosinophils Relative 3 0 - 6 %    Basophils Relative 1 0 - 1 %    Absolute Neutrophils 2.94 1.85 - 7.62 Thousands/µL    Absolute Immature Grans 0.02 0.00 - 0.20 Thousand/uL    Absolute Lymphocytes 1.56 0.60 - 4.47 Thousands/µL    Absolute Monocytes 0.47 0.17 - 1.22 Thousand/µL    Eosinophils Absolute 0.13 0.00 - 0.61 Thousand/µL    Basophils Absolute 0.04 0.00 - 0.10 Thousands/µL   Comprehensive metabolic panel    Collection Time: 07/01/25 10:36 AM   Result Value Ref Range    Sodium 137 135 - 147 mmol/L    Potassium 3.9 3.5 - 5.3 mmol/L    Chloride 104 96 - 108 mmol/L    CO2 28 21 - 32 mmol/L    ANION GAP 5 4 - 13 mmol/L    BUN 13 5 - 25 mg/dL     Creatinine 0.75 0.60 - 1.30 mg/dL    Glucose, Fasting 97 65 - 99 mg/dL    Calcium 8.8 8.4 - 10.2 mg/dL    AST 9 (L) 13 - 39 U/L    ALT 14 7 - 52 U/L    Alkaline Phosphatase 50 34 - 104 U/L    Total Protein 6.2 (L) 6.4 - 8.4 g/dL    Albumin 3.5 3.5 - 5.0 g/dL    Total Bilirubin 0.55 0.20 - 1.00 mg/dL    eGFR 76 ml/min/1.73sq m   Hemoglobin A1C    Collection Time: 07/01/25 10:36 AM   Result Value Ref Range    Hemoglobin A1C 5.7 (H) Normal 4.0-5.6%; PreDiabetic 5.7-6.4%; Diabetic >=6.5%; Glycemic control for adults with diabetes <7.0% %     mg/dl   Lipid panel    Collection Time: 07/01/25 10:36 AM   Result Value Ref Range    Cholesterol 175 See Comment mg/dL    Triglycerides 68 See Comment mg/dL    HDL, Direct 53 >=50 mg/dL    LDL Calculated 108 (H) 0 - 100 mg/dL    Non-HDL-Chol (CHOL-HDL) 122 mg/dl   TSH, 3rd generation with Free T4 reflex    Collection Time: 07/01/25 10:36 AM   Result Value Ref Range    TSH 3RD GENERATION 1.189 0.450 - 4.500 uIU/mL   UA w Reflex to Microscopic w Reflex to Culture    Collection Time: 07/01/25 10:36 AM    Specimen: Urine, Clean Catch   Result Value Ref Range    Color, UA Light Yellow     Clarity, UA Clear     Specific Gravity, UA 1.015 1.005 - 1.030    pH, UA 8.0 4.5, 5.0, 5.5, 6.0, 6.5, 7.0, 7.5, 8.0    Leukocytes, UA Small (A) Negative    Nitrite, UA Negative Negative    Protein, UA Negative Negative mg/dl    Glucose, UA Negative Negative mg/dl    Ketones, UA Negative Negative mg/dl    Urobilinogen, UA <2.0 <2.0 mg/dl mg/dl    Bilirubin, UA Negative Negative    Occult Blood, UA Negative Negative   Urine Microscopic    Collection Time: 07/01/25 10:36 AM   Result Value Ref Range    RBC, UA None Seen None Seen, 0-1, 1-2, 2-4, 0-5 /hpf    WBC, UA 4-10 (A) None Seen, 0-1, 1-2, 0-5, 2-4 /hpf    Epithelial Cells Occasional None Seen, Occasional /hpf    Bacteria, UA Innumerable (A) None Seen, Occasional /hpf

## 2025-07-07 NOTE — PROGRESS NOTES
Assessment/Plan:  Pain.  Mycosis of nail.  Paronychia.  Peripheral artery disease.     Plan.  Chart reviewed.  Foot exam performed.  Patient educated on condition.  All mycotic nails debrided without pain or complication.  Nails debrided mechanically with nail nipper.  Aftercare instruction given.  Return for follow-up     Subjective:   Patient complains of pain in her feet with ambulation.  No history of trauma.             Past Medical History:   Diagnosis Date    Hypertension      Macular degeneration                     Past Surgical History:   Procedure Laterality Date    INTRAOCULAR LENS INSERTION                       Allergies   Allergen Reactions    Atorvastatin Other (See Comments)       legs get stiff    Zithromax [Azithromycin]      Erythromycin Base Palpitations            Current Outpatient Prescriptions:     Calcium Carb-Cholecalciferol (CALCIUM 1000 + D) 1000-800 MG-UNIT TABS, Calcium TABS  Refills: 0  Active, Disp: , Rfl:     cholecalciferol (VITAMIN D3) 1,000 units tablet, Vitamin D TABS  Refills: 0  Active, Disp: , Rfl:     Cinnamon 500 MG capsule, Cinnamon CAPS  Refills: 0  Active, Disp: , Rfl:     cycloSPORINE (RESTASIS) 0.05 % ophthalmic emulsion, Restasis 0.05 % Ophthalmic Emulsion  Refills: 0  Active, Disp: , Rfl:     Flaxseed, Linseed, (FLAX SEED OIL) 1000 MG CAPS, Flax Seed Oil 1000 MG Oral Capsule  Refills: 0  Active, Disp: , Rfl:     fluticasone (FLONASE) 50 mcg/act nasal spray, Fluticasone Propionate 50 MCG/ACT Nasal Suspension  Quantity: 16;  Refills: 0   Started 29-Nov-2014 Active, Disp: , Rfl:     Magnesium 100 MG CAPS, Magnesium TABS  Refills: 0  Active, Disp: , Rfl:     Omega-3 Fatty Acids (FISH OIL) 645 MG CAPS, Fish Oil CAPS  Refills: 0  Active, Disp: , Rfl:     Potassium 95 MG TABS, Potassium TABS  Refills: 0  Active, Disp: , Rfl:     telmisartan (MICARDIS) 80 MG tablet, Micardis 80 MG Oral Tablet  Refills: 0  Active, Disp: , Rfl:      There is no problem list on file for  this patient.            Patient ID: Neisha Salazar is a 80 y.o. female.     HPI     The following portions of the patient's history were reviewed and updated as appropriate: allergies, current medications, past family history, past medical history, past social history, past surgical history and problem list.        Objective:  Patient's shoes and socks removed.   Foot ExamPhysical Exam             Physical Exam  Left Foot: Appearance: Normal except as noted: excessive supination\R\R\b0pes cavus. Tenderness: None except the great toe,\R\y5rtnmvi longitudinal arch\R\R\n2itlnbvqsx of the plantar fascia.   Right Foot: Appearance: Normal except as noted: excessive supination\R\R\b0pes cavus. Tenderness: None except the medial longitudinal arch\R\R\b8bzdevlcko of the plantar fascia.   Left Ankle: ROM: limited ROM in all planes   Right Ankle: ROM: limited ROM in all planes   Neurological Exam: performed. Light touch was intact bilaterally. Vibratory sensation was intact bilaterally. Response to monofilament test was intact bilaterally. Deep tendon reflexes: patellar reflex present bilaterally\R\R\l9zetefpup reflex present bilaterally.   Vascular Exam: performed Dorsalis pedis pulses were diminished bilaterally. Posterior tibial pulses were diminished bilaterally. Elevation Pallor: present bilaterally. Capillary refill time was greater than 3 seconds bilaterally\R\R\t0Ihpva 8 findings bilateral negative digital hair noted all mycotic nails debrided today. Edema: mild bilaterally.  These are Q, 9 findings bilateral  Toenails: All of the toenails were elongated,\R\n6psophkutintgy,\R\e8dmsmppkvug,\R\o0lgzyqkd,\R\u3lixown\R\R\x2Dnbhnkl.   Hyperkeratosis: present on both first sub metatarsals.   Shoe Gear Evaluation: Recommendation(s): custom inlays\R\R\b0SAS style.

## 2025-07-07 NOTE — ASSESSMENT & PLAN NOTE
Lab reveals hemoglobin A1c same at 5.7 again emphasized regarding cutting back carbohydrate intake

## 2025-07-07 NOTE — ASSESSMENT & PLAN NOTE
Lipid profile shows cholesterol 175 triglycerides 68 HDL 53  emphasized continue with a low-cholesterol diet

## 2025-07-07 NOTE — ASSESSMENT & PLAN NOTE
Patient with constipation issues sometimes passing very hard stools recommend patient to take Colace 100 mg daily if necessary increase it to 2 capsules daily.  Recommend patient drink more water also

## 2025-07-07 NOTE — ASSESSMENT & PLAN NOTE
Has been under a lot of stress with her  having the diagnosis of carcinoma of the lung occasionally she takes Xanax

## 2025-07-07 NOTE — ASSESSMENT & PLAN NOTE
Patient lost about 10 pounds in last 4 months time she has been cutting back on her calorie intake gradually her BMI is now 29.08 it was 35.06 before

## 2025-07-07 NOTE — ASSESSMENT & PLAN NOTE
Patient with increased frequency with urination recommend reevaluation by the urogynecologist regarding uterovaginal prolapse.  Urine analysis had shown few bacteria but patient has no symptoms of burning sensation but she does have increased frequency with urination especially in the nighttime I have prescribed her Vesicare in the past but she had not taken it again she is concerned about the side effects.  Follow-up with the urogynecologist

## 2025-07-07 NOTE — ASSESSMENT & PLAN NOTE
Patient with osteoporosis could not tolerate Fosamax in the past opted to go on any other medication at present time concerned about the side effects.  Currently she is taking calcium and vitamin D will continue with the same last DEXA scan was done December 2023 the next one will be in December 2025

## 2025-07-11 ENCOUNTER — TELEPHONE (OUTPATIENT)
Age: 80
End: 2025-07-11

## 2025-07-13 DIAGNOSIS — Z12.31 ENCOUNTER FOR SCREENING MAMMOGRAM FOR BREAST CANCER: Primary | ICD-10-CM

## 2025-07-14 ENCOUNTER — TELEPHONE (OUTPATIENT)
Dept: OBGYN CLINIC | Facility: CLINIC | Age: 80
End: 2025-07-14

## 2025-07-14 ENCOUNTER — TELEPHONE (OUTPATIENT)
Age: 80
End: 2025-07-14

## 2025-07-14 NOTE — TELEPHONE ENCOUNTER
Left message for pt to call back to regarding your appointment on 7/15.  Pt can be transferred to office.

## 2025-07-15 ENCOUNTER — OFFICE VISIT (OUTPATIENT)
Dept: OBGYN CLINIC | Facility: CLINIC | Age: 80
End: 2025-07-15
Payer: MEDICARE

## 2025-07-15 VITALS
WEIGHT: 157 LBS | DIASTOLIC BLOOD PRESSURE: 80 MMHG | BODY MASS INDEX: 28.89 KG/M2 | HEIGHT: 62 IN | SYSTOLIC BLOOD PRESSURE: 124 MMHG

## 2025-07-15 DIAGNOSIS — R35.0 URINARY FREQUENCY: ICD-10-CM

## 2025-07-15 DIAGNOSIS — R35.1 NOCTURIA: Primary | ICD-10-CM

## 2025-07-15 PROCEDURE — 81514 NFCT DS BV&VAGINITIS DNA ALG: CPT | Performed by: NURSE PRACTITIONER

## 2025-07-15 PROCEDURE — 99203 OFFICE O/P NEW LOW 30 MIN: CPT | Performed by: NURSE PRACTITIONER

## 2025-07-15 PROCEDURE — 87070 CULTURE OTHR SPECIMN AEROBIC: CPT | Performed by: NURSE PRACTITIONER

## 2025-07-16 LAB
C GLABRATA DNA VAG QL NAA+PROBE: NEGATIVE
C KRUSEI DNA VAG QL NAA+PROBE: NEGATIVE
CANDIDA SP 6 PNL VAG NAA+PROBE: NEGATIVE
T VAGINALIS DNA VAG QL NAA+PROBE: NEGATIVE
VAGINOSIS/ITIS DNA PNL VAG PROBE+SIG AMP: NEGATIVE

## 2025-07-18 LAB — BACTERIA GENITAL AEROBE CULT: NORMAL

## 2025-07-21 ENCOUNTER — TELEPHONE (OUTPATIENT)
Age: 80
End: 2025-07-21

## 2025-07-21 NOTE — TELEPHONE ENCOUNTER
Pt under care of:  Cody   Office Location: [facility]     Insurance   Current Insurance?Yes    Insurance E-verified? Yes       History  Last Seen (include Follow Up recommendations of last visit- see Office Visit - Instructions): 6/25/24     Pt calling due to:  ref from gyn for bacteria in urine/ increased frequency and urgency at night     Appointment Details   Date:  7/22/25    Time:  9:45 am     Location:  [facility]     Provider:  Cody     Does the appointment need further review? No       Pt can be reached at: [phone]

## 2025-07-22 ENCOUNTER — HOSPITAL ENCOUNTER (OUTPATIENT)
Dept: RADIOLOGY | Facility: HOSPITAL | Age: 80
Discharge: HOME | End: 2025-07-22
Attending: INTERNAL MEDICINE
Payer: MEDICARE

## 2025-07-22 ENCOUNTER — OFFICE VISIT (OUTPATIENT)
Dept: UROLOGY | Facility: CLINIC | Age: 80
End: 2025-07-22
Payer: MEDICARE

## 2025-07-22 VITALS
SYSTOLIC BLOOD PRESSURE: 148 MMHG | BODY MASS INDEX: 29.63 KG/M2 | HEART RATE: 78 BPM | WEIGHT: 161 LBS | DIASTOLIC BLOOD PRESSURE: 84 MMHG | HEIGHT: 62 IN | OXYGEN SATURATION: 98 %

## 2025-07-22 VITALS — BODY MASS INDEX: 29.63 KG/M2 | HEIGHT: 62 IN | WEIGHT: 161 LBS

## 2025-07-22 DIAGNOSIS — Z12.31 ENCOUNTER FOR SCREENING MAMMOGRAM FOR BREAST CANCER: ICD-10-CM

## 2025-07-22 DIAGNOSIS — R35.0 URINARY FREQUENCY: Primary | ICD-10-CM

## 2025-07-22 LAB — POST-VOID RESIDUAL VOLUME, ML POC: 31 ML

## 2025-07-22 PROCEDURE — 77067 SCR MAMMO BI INCL CAD: CPT

## 2025-07-22 PROCEDURE — 77063 BREAST TOMOSYNTHESIS BI: CPT

## 2025-07-22 PROCEDURE — 99213 OFFICE O/P EST LOW 20 MIN: CPT | Performed by: UROLOGY

## 2025-07-22 PROCEDURE — 51798 US URINE CAPACITY MEASURE: CPT | Performed by: UROLOGY

## 2025-07-22 RX ORDER — CHOLECALCIFEROL (VITAMIN D3) 25 MCG
1000 TABLET ORAL DAILY
COMMUNITY

## 2025-07-22 NOTE — PATIENT INSTRUCTIONS
Best to try to elevate the legs on some pillows above the heart for an hour or 2 in the few hours before bed to try to move any fluid from the lower extremities to the kidneys.  This might help to make you urinate less at night.  Would hold off on treating any urinary infection.  It is likely that the bacteria and the white cells in the urine were just contaminant from not being a perfect midstream specimen.

## 2025-07-22 NOTE — PROGRESS NOTES
Neisha Salazar is a(n) 80 y.o. female. , :  1945    Subjective     Assessment:  There were no encounter diagnoses.  Patient had incidental leukocytes and bacteria on a urine analysis.  Completely asymptomatic.  Gets up hourly at night.  Physical examination does not show significant lower extremity pitting edema but her legs do look larger than normal so it is possible that she is retaining some fluid in the lower extremities which could be judged by a diary of how much fluid she puts out at night.  She does not really feel she can do a measurement with each void as she would have to turn the light on at [facility].  I told her to maybe do a few urinations at night then turn the light on measure the volume record it, empty the hat and then return to bed.  Hopefully she can bring a list of how much urine she is made at night.  I also advised leg elevation and follow-up in a couple of months.  No need for overactive bladder medications unless she is having significant problems during the day.  Does not sound like she is.  Residual was normal today.  She was unable to give a urine specimen.  She is a little flustered today because of trying to get her  for labs, come to this visit, and get ready for mammography later today.    Plan:  Night void diary.  Call if symptoms of a urinary infection.  Leg elevation in the afternoons.     History  Anterior posterior repair 2023 Dr. Bolden [facility]  Nocturia not improved with oxybutynin 5 mg twice daily.     Prior Visits  2023 Yuan  UDS:     Initial uroflow: 0cc Catheterized: 120cc  Pessary: #2 ring  UDS  Instilled: 248cc (removed 90cc secondary to DO and bothersome urgency) Voided: 100cc  Positive detrusor overactivity + leakage  Detrusor pressure radha to a high of 34cm/H20 during micturition.   One episode of Valsalva leakage but noted that patient was having DO contraction right before valsalva maneuver.   Minimal increased EMG activity during  micturition.   able to void with catheter in place.      11/09/2023 Chery [facility] Urogyn  Procedure: ([place])  1. Anterior wall native tissue repair  2. Posterior wall native tissue repair  3. Perineorrhaphy  4. Cystourethroscopy  Surgery Date: 9/12/23      Urge incontinence   C/o nocturia. Interested in medications.   Rx for Myrbetriq 25 mg daily  F/u w/ PA in 6 weeks      Surgical aftercare, genitourinary system   Well healed incision. Pt very happy with outcome.      5/9/2024 Annam  Urinary frequency  Assessment & Plan:  Patient currently taking oxybutynin 5 mg twice daily he is going to have an appointment with the urologist also for further evaluation and treatment with history of uterovaginal prolapse causing urgency with urination was seen in the past by the urogynecologist     6/25/2024 BALDEMAR Cody  79-year-old very pleasant and intelligent woman referred from Dr. Douglas for nocturia.  She did have previous daytime frequency but that was improved with the oxybutynin at 5 mg twice daily.  She is a little concerned that she might get dehydrated if she increases to 3 times a day.  The drug has the potential to cause constipation and dry mouth.  She has never done a voiding diary to see about how much fluid she mobilizes in the afternoon and evening.  She does not admit to significant lower extremity swelling.  She has had her blood pressure medication stopped.  Blood pressure is always good.  Does not feel as if the operation made her urinary frequency more prevalent.  She has had this issue for probably 10 years.     Plan: We will do an nighttime voiding diary and then see her back.  She was explained that if she is seeing large volumes voided at night that she should probably do leg elevation in the late afternoon or early evening to see if she can mobilize her fluids before bed.  This might allow her to sleep through the night.  She should continue the oxybutynin at least once or twice each day as a background  "just so we do not confuse the issues.    7/22/2025 OV Alex Ross  Patient had incidental leukocytes and bacteria on a urine analysis.  Completely asymptomatic.  Gets up hourly at night.  Physical examination does not show significant lower extremity pitting edema but her legs do look larger than normal so it is possible that she is retaining some fluid in the lower extremities which could be judged by a diary of how much fluid she puts out at night.  She does not really feel she can do a measurement with each void as she would have to turn the light on at [facility].  I told her to maybe do a few urinations at night then turn the light on measure the volume record it, empty the hat and then return to bed.  Hopefully she can bring a list of how much urine she is made at night.  I also advised leg elevation and follow-up in a couple of months.  No need for overactive bladder medications unless she is having significant problems during the day.  Does not sound like she is.  Residual was normal today.  She was unable to give a urine specimen.  She is a little flustered today because of trying to get her  for labs, come to this visit, and get ready for mammography later today.    Review of Systems    No results found for: \"PSA\"  No results found for: \"TESTOSTERONE\"  No components found for: \"CR\"  No results found for: \"HBA1C\"    Objective     Wt 73 kg (161 lb)   BMI 29.45 kg/m²     Physical Exam      Alex Ross, [facility]  "